# Patient Record
Sex: FEMALE | Race: WHITE | Employment: OTHER | URBAN - METROPOLITAN AREA
[De-identification: names, ages, dates, MRNs, and addresses within clinical notes are randomized per-mention and may not be internally consistent; named-entity substitution may affect disease eponyms.]

---

## 2017-10-12 PROBLEM — Q25.3 SUPRAVALVULAR AORTIC STENOSIS: Status: ACTIVE | Noted: 2017-10-12

## 2018-07-18 PROBLEM — M48.061 SPINAL STENOSIS OF LUMBAR REGION WITHOUT NEUROGENIC CLAUDICATION: Status: ACTIVE | Noted: 2018-07-18

## 2018-10-17 PROBLEM — I27.21 SECONDARY PULMONARY ARTERIAL HYPERTENSION (HCC): Status: ACTIVE | Noted: 2018-10-17

## 2019-02-01 PROBLEM — I87.2 VENOUS INSUFFICIENCY OF LEFT LOWER EXTREMITY: Status: ACTIVE | Noted: 2019-02-01

## 2019-08-15 ENCOUNTER — HOSPITAL ENCOUNTER (OUTPATIENT)
Dept: MRI IMAGING | Age: 79
Discharge: HOME | End: 2019-08-15
Attending: FAMILY MEDICINE
Payer: MEDICARE

## 2019-08-15 DIAGNOSIS — N39.3 STRESS INCONTINENCE OF URINE: ICD-10-CM

## 2019-08-15 PROCEDURE — 72148 MRI LUMBAR SPINE W/O DYE: CPT

## 2020-07-28 ENCOUNTER — HOSPITAL ENCOUNTER (OUTPATIENT)
Dept: LAB | Age: 80
Discharge: HOME | End: 2020-07-28

## 2020-07-28 PROCEDURE — 88312 SPECIAL STAINS GROUP 1: CPT

## 2020-07-28 PROCEDURE — 88305 TISSUE EXAM BY PATHOLOGIST: CPT

## 2022-03-18 PROBLEM — M48.061 SPINAL STENOSIS OF LUMBAR REGION WITHOUT NEUROGENIC CLAUDICATION: Status: ACTIVE | Noted: 2018-07-18

## 2022-03-19 PROBLEM — I87.2 VENOUS INSUFFICIENCY OF LEFT LOWER EXTREMITY: Status: ACTIVE | Noted: 2019-02-01

## 2022-03-19 PROBLEM — I27.21 SECONDARY PULMONARY ARTERIAL HYPERTENSION (HCC): Status: ACTIVE | Noted: 2018-10-17

## 2022-03-19 PROBLEM — Q25.3 SUPRAVALVULAR AORTIC STENOSIS: Status: ACTIVE | Noted: 2017-10-12

## 2022-05-21 SDOH — HEALTH STABILITY: PHYSICAL HEALTH: ON AVERAGE, HOW MANY DAYS PER WEEK DO YOU ENGAGE IN MODERATE TO STRENUOUS EXERCISE (LIKE A BRISK WALK)?: 0 DAYS

## 2022-05-21 ASSESSMENT — LIFESTYLE VARIABLES
HOW OFTEN DO YOU HAVE A DRINK CONTAINING ALCOHOL: 1
HOW OFTEN DO YOU HAVE A DRINK CONTAINING ALCOHOL: NEVER
HOW OFTEN DO YOU HAVE SIX OR MORE DRINKS ON ONE OCCASION: 1

## 2022-05-26 ENCOUNTER — OFFICE VISIT (OUTPATIENT)
Dept: FAMILY MEDICINE CLINIC | Facility: CLINIC | Age: 82
End: 2022-05-26
Payer: MEDICARE

## 2022-05-26 VITALS
BODY MASS INDEX: 21.57 KG/M2 | DIASTOLIC BLOOD PRESSURE: 42 MMHG | TEMPERATURE: 98.2 F | WEIGHT: 107 LBS | OXYGEN SATURATION: 97 % | HEIGHT: 59 IN | HEART RATE: 54 BPM | SYSTOLIC BLOOD PRESSURE: 132 MMHG

## 2022-05-26 DIAGNOSIS — M06.9 RHEUMATOID ARTHRITIS, INVOLVING UNSPECIFIED SITE, UNSPECIFIED WHETHER RHEUMATOID FACTOR PRESENT (HCC): ICD-10-CM

## 2022-05-26 DIAGNOSIS — I35.0 AORTIC VALVE STENOSIS, MILD: ICD-10-CM

## 2022-05-26 DIAGNOSIS — I10 ESSENTIAL HYPERTENSION, BENIGN: ICD-10-CM

## 2022-05-26 DIAGNOSIS — Z00.00 MEDICARE ANNUAL WELLNESS VISIT, SUBSEQUENT: Primary | ICD-10-CM

## 2022-05-26 DIAGNOSIS — I35.9 NONRHEUMATIC AORTIC VALVE DISORDER: ICD-10-CM

## 2022-05-26 DIAGNOSIS — I27.21 SECONDARY PULMONARY ARTERIAL HYPERTENSION (HCC): ICD-10-CM

## 2022-05-26 LAB
ALBUMIN SERPL-MCNC: 3.4 G/DL (ref 3.2–4.6)
ALBUMIN/GLOB SERPL: 1 {RATIO} (ref 1.2–3.5)
ALP SERPL-CCNC: 91 U/L (ref 50–136)
ALT SERPL-CCNC: 24 U/L (ref 12–65)
ANION GAP SERPL CALC-SCNC: 3 MMOL/L (ref 7–16)
AST SERPL-CCNC: 25 U/L (ref 15–37)
BASOPHILS # BLD: 0 K/UL (ref 0–0.2)
BASOPHILS NFR BLD: 1 % (ref 0–2)
BILIRUB SERPL-MCNC: 0.7 MG/DL (ref 0.2–1.1)
BUN SERPL-MCNC: 24 MG/DL (ref 8–23)
CALCIUM SERPL-MCNC: 9.6 MG/DL (ref 8.3–10.4)
CHLORIDE SERPL-SCNC: 109 MMOL/L (ref 98–107)
CHOLEST SERPL-MCNC: 178 MG/DL
CO2 SERPL-SCNC: 30 MMOL/L (ref 21–32)
CREAT SERPL-MCNC: 0.8 MG/DL (ref 0.6–1)
DIFFERENTIAL METHOD BLD: ABNORMAL
EOSINOPHIL # BLD: 0.2 K/UL (ref 0–0.8)
EOSINOPHIL NFR BLD: 7 % (ref 0.5–7.8)
ERYTHROCYTE [DISTWIDTH] IN BLOOD BY AUTOMATED COUNT: 14.6 % (ref 11.9–14.6)
GLOBULIN SER CALC-MCNC: 3.3 G/DL (ref 2.3–3.5)
GLUCOSE SERPL-MCNC: 86 MG/DL (ref 65–100)
HCT VFR BLD AUTO: 37.7 % (ref 35.8–46.3)
HDLC SERPL-MCNC: 93 MG/DL (ref 40–60)
HDLC SERPL: 1.9 {RATIO}
HGB BLD-MCNC: 12.2 G/DL (ref 11.7–15.4)
IMM GRANULOCYTES # BLD AUTO: 0 K/UL (ref 0–0.5)
IMM GRANULOCYTES NFR BLD AUTO: 0 % (ref 0–5)
LDLC SERPL CALC-MCNC: 69.6 MG/DL
LYMPHOCYTES # BLD: 1.1 K/UL (ref 0.5–4.6)
LYMPHOCYTES NFR BLD: 33 % (ref 13–44)
MCH RBC QN AUTO: 33.9 PG (ref 26.1–32.9)
MCHC RBC AUTO-ENTMCNC: 32.4 G/DL (ref 31.4–35)
MCV RBC AUTO: 104.7 FL (ref 79.6–97.8)
MONOCYTES # BLD: 0.5 K/UL (ref 0.1–1.3)
MONOCYTES NFR BLD: 16 % (ref 4–12)
NEUTS SEG # BLD: 1.4 K/UL (ref 1.7–8.2)
NEUTS SEG NFR BLD: 43 % (ref 43–78)
NRBC # BLD: 0 K/UL (ref 0–0.2)
PLATELET # BLD AUTO: 87 K/UL (ref 150–450)
PMV BLD AUTO: 12.4 FL (ref 9.4–12.3)
POTASSIUM SERPL-SCNC: 4.3 MMOL/L (ref 3.5–5.1)
PROT SERPL-MCNC: 6.7 G/DL (ref 6.3–8.2)
RBC # BLD AUTO: 3.6 M/UL (ref 4.05–5.2)
SODIUM SERPL-SCNC: 142 MMOL/L (ref 136–145)
TRIGL SERPL-MCNC: 77 MG/DL (ref 35–150)
VLDLC SERPL CALC-MCNC: 15.4 MG/DL (ref 6–23)
WBC # BLD AUTO: 3.2 K/UL (ref 4.3–11.1)

## 2022-05-26 PROCEDURE — G8427 DOCREV CUR MEDS BY ELIG CLIN: HCPCS | Performed by: FAMILY MEDICINE

## 2022-05-26 PROCEDURE — 1036F TOBACCO NON-USER: CPT | Performed by: FAMILY MEDICINE

## 2022-05-26 PROCEDURE — 1090F PRES/ABSN URINE INCON ASSESS: CPT | Performed by: FAMILY MEDICINE

## 2022-05-26 PROCEDURE — 99214 OFFICE O/P EST MOD 30 MIN: CPT | Performed by: FAMILY MEDICINE

## 2022-05-26 PROCEDURE — G8420 CALC BMI NORM PARAMETERS: HCPCS | Performed by: FAMILY MEDICINE

## 2022-05-26 PROCEDURE — 1123F ACP DISCUSS/DSCN MKR DOCD: CPT | Performed by: FAMILY MEDICINE

## 2022-05-26 PROCEDURE — G0439 PPPS, SUBSEQ VISIT: HCPCS | Performed by: FAMILY MEDICINE

## 2022-05-26 PROCEDURE — G8400 PT W/DXA NO RESULTS DOC: HCPCS | Performed by: FAMILY MEDICINE

## 2022-05-26 RX ORDER — MULTIVITAMIN WITH IRON
250 TABLET ORAL
COMMUNITY

## 2022-05-26 RX ORDER — ESTRADIOL 0.1 MG/G
1 CREAM VAGINAL WEEKLY
COMMUNITY
Start: 2019-12-23

## 2022-05-26 RX ORDER — CELECOXIB 100 MG/1
100 CAPSULE ORAL DAILY
COMMUNITY
End: 2022-11-01

## 2022-05-26 RX ORDER — MONTELUKAST SODIUM 4 MG/1
1 TABLET, CHEWABLE ORAL DAILY
Qty: 60 TABLET | Refills: 3 | Status: SHIPPED | OUTPATIENT
Start: 2022-05-26

## 2022-05-26 NOTE — PATIENT INSTRUCTIONS
Personalized Preventive Plan for Farzad Devine - 5/26/2022  Medicare offers a range of preventive health benefits. Some of the tests and screenings are paid in full while other may be subject to a deductible, co-insurance, and/or copay. Some of these benefits include a comprehensive review of your medical history including lifestyle, illnesses that may run in your family, and various assessments and screenings as appropriate. After reviewing your medical record and screening and assessments performed today your provider may have ordered immunizations, labs, imaging, and/or referrals for you. A list of these orders (if applicable) as well as your Preventive Care list are included within your After Visit Summary for your review. Other Preventive Recommendations:    · A preventive eye exam performed by an eye specialist is recommended every 1-2 years to screen for glaucoma; cataracts, macular degeneration, and other eye disorders. · A preventive dental visit is recommended every 6 months. · Try to get at least 150 minutes of exercise per week or 10,000 steps per day on a pedometer . · Order or download the FREE \"Exercise & Physical Activity: Your Everyday Guide\" from [employer]. Call 6-908.719.4639 or search [employer] online. · You need 8704-0076 mg of calcium and 0780-1331 IU of vitamin D per day. It is possible to meet your calcium requirement with diet alone, but a vitamin D supplement is usually necessary to meet this goal.  · When exposed to the sun, use a sunscreen that protects against both UVA and UVB radiation with an SPF of 30 or greater. Reapply every 2 to 3 hours or after sweating, drying off with a towel, or swimming. · Always wear a seat belt when traveling in a car. Always wear a helmet when riding a bicycle or motorcycle.

## 2022-05-26 NOTE — PROGRESS NOTES
Lila Hernandez (: 1940) is a 80 y.o. female, established patient, here for evaluation of the following chief complaint(s):  No chief complaint on file. ASSESSMENT/PLAN:  1. Medicare annual wellness visit, subsequent  -     CBC with Auto Differential; Future  -     Comprehensive Metabolic Panel; Future  -     Lipid Panel; Future  2. Nonrheumatic aortic valve disorder  -     CBC with Auto Differential; Future  -     Comprehensive Metabolic Panel; Future  -     Lipid Panel; Future  3. Aortic valve stenosis, mild  -     CBC with Auto Differential; Future  -     Comprehensive Metabolic Panel; Future  -     Lipid Panel; Future  4. Essential hypertension, benign  -     CBC with Auto Differential; Future  -     Comprehensive Metabolic Panel; Future  -     Lipid Panel; Future  5. Rheumatoid arthritis, involving unspecified site, unspecified whether rheumatoid factor present ([facility] [address]  6. Secondary pulmonary arterial hypertension ([facility] [address]      Return for Medicare Annual Wellness Visit in 1 year. Medicare wellness visit completed  Refill chronic medications  Check labs today  See dentist every 6 months  See eye physician or get eyes checked every 1-2 years  Immunizations reviewed and discussed with patient    SUBJECTIVE/OBJECTIVE:  HPI    Review of Systems    Physical Exam    On this date 22  I have spent 30 minutes reviewing previous notes, test results and face to face with the patient discussing the diagnosis and importance of compliance with the treatment plan as well as documenting on the day of the visit. An electronic signature was used to authenticate this note. -- Alena Nair MD         Medicare Annual Wellness Visit    Lila Hernandez is here for No chief complaint on file. Assessment & Plan   Medicare annual wellness visit, subsequent  -     CBC with Auto Differential; Future  -     Comprehensive Metabolic Panel; Future  -     Lipid Panel;  Future  Nonrheumatic aortic valve disorder  -     CBC with Auto Differential; Future  -     Comprehensive Metabolic Panel; Future  -     Lipid Panel; Future  Aortic valve stenosis, mild  -     CBC with Auto Differential; Future  -     Comprehensive Metabolic Panel; Future  -     Lipid Panel; Future  Essential hypertension, benign  -     CBC with Auto Differential; Future  -     Comprehensive Metabolic Panel; Future  -     Lipid Panel; Future  Rheumatoid arthritis, involving unspecified site, unspecified whether rheumatoid factor present ([facility] [address]  Secondary pulmonary arterial hypertension ([facility] [address]      Recommendations for Preventive Services Due: see orders and patient instructions/AVS.  Recommended screening schedule for the next 5-10 years is provided to the patient in written form: see Patient Instructions/AVS.     Return for Medicare Annual Wellness Visit in 1 year. Subjective   The following acute and/or chronic problems were also addressed today:  Hip, low back pain, severe, seeing rheum, ortho, and pain management,   Declines stronger prescription medications at this time. Patient's complete Health Risk Assessment and screening values have been reviewed and are found in Flowsheets. The following problems were reviewed today and where indicated follow up appointments were made and/or referrals ordered. Positive Risk Factor Screenings with Interventions:    Fall Risk:  Do you feel unsteady or are you worried about falling? : (!) yes  2 or more falls in past year?: no  Fall with injury in past year?: no     Fall Risk Interventions:    · discussed with patient.               General Health and ACP:  General  In general, how would you say your health is?: Fair  In the past 7 days, have you experienced any of the following: New or Increased Pain, New or Increased Fatigue, Loneliness, Social Isolation, Stress or Anger?: (!) Yes  Select all that apply: (!) New or Increased Pain,Stress  Do you get the social and emotional support that you need?: Yes  Do you have a Living Will?: Yes    Advance Directives     Power of  Living Will ACP-Advance Directive ACP-Power of Vince Mustafa on 08/28/17 Not on File Not on File [address] [facility] [place] Risk Interventions:  · Pain issues: pain management referral ordered     Hearing/Vision:  Do you or your family notice any trouble with your hearing that hasn't been managed with hearing aids?: (!) Yes  Do you have difficulty driving, watching TV, or doing any of your daily activities because of your eyesight?: (!) Yes  Have you had an eye exam within the past year?: Yes  No exam data present    Hearing/Vision Interventions:  · discussed            Objective   Vitals:    05/26/22 1008   BP: (!) 132/42   Pulse: 54   Temp: 98.2 °F (36.8 °C)   TempSrc: Oral   SpO2: 97%   Weight: 107 lb (48.5 kg)   Height: 4' 10.5\" (1.486 m)      Body mass index is 21.98 kg/m². Allergies   Allergen Reactions    Amoxicillin Other (See Comments)     Causes increased methotrexate levels    Azithromycin Diarrhea    Benazepril      Unknown to pt       Celecoxib Other (See Comments)     GI upset.   As of 1/6/2021 pt states she does not have a reaction to this and takes Celebrex    Cyclobenzaprine Other (See Comments)    Ergotamine Other (See Comments)     Abdominal pain     Fluvastatin Other (See Comments)    Ibandronic Acid Other (See Comments)     Severe bone pain    Lactose Other (See Comments)    Other Myalgia and Other (See Comments)    Pravastatin Other (See Comments)    Risedronate Other (See Comments)     Severe bone pain all over    Rosuvastatin Other (See Comments)    Shellfish Allergy Diarrhea    Simvastatin Other (See Comments)    Sulfa Antibiotics Itching and Other (See Comments)     Patient states \"tingling in hands and feet\"    Sulfisoxazole Other (See Comments)     Tingling in fingers    Tetracycline Diarrhea and Other (See Comments)     Abdominal pain    Trospium Other (See Comments)    Adhesive Tape Rash and Other (See Comments)     Surgical tape      Amlodipine Rash     Prior to Visit Medications    Medication Sig Taking? Authorizing Provider   celecoxib (CELEBREX) 100 MG capsule Take 100 mg by mouth daily Yes Historical Provider, MD   calcium citrate-vitamin d 250-200 MG-UNIT TABS Take by mouth daily Yes Historical Provider, MD   colestipol (COLESTID) 1 g tablet Take 1 tablet by mouth daily TAKE 1 TABLET BY MOUTH EVERY DAY Yes Nikia Capellan MD   Menaquinone-7 (VITAMIN K2 PO) Take by mouth Yes Ar Automatic Reconciliation   acetaminophen (TYLENOL) 650 MG extended release tablet Take 650 mg by mouth 2 times daily Yes Ar Automatic Reconciliation   ascorbic acid (VITAMIN C) 500 MG tablet Take 500 mg by mouth 2 times daily Yes Ar Automatic Reconciliation   vitamin D3 (CHOLECALCIFEROL) 125 MCG (5000 UT) TABS tablet Take by mouth daily Yes Ar Automatic Reconciliation   coenzyme Q10 100 MG CAPS capsule Take 100 mg by mouth daily Yes Ar Automatic Reconciliation   ezetimibe (ZETIA) 10 MG tablet TAKE 1 TABLET BY MOUTH EVERY DAY Yes Ar Automatic Reconciliation   famotidine (PEPCID) 40 MG tablet TAKE 1 TABLET BY MOUTH EVERY DAY AT NIGHT Yes Ar Automatic Reconciliation   folic acid (FOLVITE) 1 MG tablet Takes 5 tabs qd Yes Ar Automatic Reconciliation   furosemide (LASIX) 20 MG tablet TAKE 1 TABLET BY MOUTH EVERY DAY Yes Ar Automatic Reconciliation   niacin (NIASPAN) 500 MG extended release tablet Take 500 mg by mouth daily Yes Ar Automatic Reconciliation   oxybutynin (DITROPAN-XL) 10 MG extended release tablet 10 mg daily Yes Ar Automatic Reconciliation   pantoprazole (PROTONIX) 40 MG tablet TAKE 1 TABLET BY MOUTH EVERY DAY Yes Ar Automatic Reconciliation   pregabalin (LYRICA) 50 MG capsule Take 50 mg by mouth every 12 hours.  Yes Ar Automatic Reconciliation       CareTeam (Including outside providers/suppliers regularly involved in providing care):   Patient Care Team:  Nikia Capellan MD as PCP - Adam Grant MD as PCP - [facility] Empaneled Provider     Reviewed and updated this visit:  Tobacco  Allergies  Meds  Problems  Med Hx  Surg Hx  Soc Hx  Fam Hx

## 2022-08-10 RX ORDER — EZETIMIBE 10 MG/1
10 TABLET ORAL DAILY
Qty: 30 TABLET | Refills: 5 | Status: SHIPPED | OUTPATIENT
Start: 2022-08-10

## 2022-09-02 ENCOUNTER — TELEPHONE (OUTPATIENT)
Dept: FAMILY MEDICINE CLINIC | Facility: CLINIC | Age: 82
End: 2022-09-02

## 2022-09-02 NOTE — TELEPHONE ENCOUNTER
Patient called stating she need a refill on Pantoprazole 40 mg to be sent to [facility]

## 2022-09-07 RX ORDER — PANTOPRAZOLE SODIUM 40 MG/1
TABLET, DELAYED RELEASE ORAL
Qty: 90 TABLET | Refills: 3 | Status: SHIPPED | OUTPATIENT
Start: 2022-09-07

## 2022-09-07 NOTE — TELEPHONE ENCOUNTER
Patient called stating she need a refill on Pantoprazole 40 mg to be sent to [address] [place], [place] [address].

## 2022-09-08 RX ORDER — PANTOPRAZOLE SODIUM 40 MG/1
TABLET, DELAYED RELEASE ORAL
Qty: 90 TABLET | Refills: 3 | OUTPATIENT
Start: 2022-09-08

## 2022-10-06 ENCOUNTER — PATIENT MESSAGE (OUTPATIENT)
Dept: FAMILY MEDICINE CLINIC | Facility: CLINIC | Age: 82
End: 2022-10-06

## 2022-10-06 NOTE — TELEPHONE ENCOUNTER
Yes, please call her and schedule a sooner appointment if available.   With me or another provider for hospital followup

## 2022-10-06 NOTE — TELEPHONE ENCOUNTER
Vm left for the patient to make a hospital follow up appointment and for hospital follow up notes. First attempt.

## 2022-10-06 NOTE — TELEPHONE ENCOUNTER
From: Dayday Dowell  To: Dr. Patricia Gutierrez  Sent: 10/6/2022 9:38 AM EDT  Subject: Latest stay at [facility] (9/30 - 10/4)    Dr. Marisabel Dias, I realize [facility] doesn't notify you when I'm in their hospital. I was admitted on 9/30 with severe rectal bleeding & acute blood loss anemia. My hemoglobin dropped to 6.3 and I needed 2 blood transfusions. I was discharged on 10/4 after it reached 9.9. Diverticulosis and 2 ulcers discovered in the distal descendinng colon were the likely bleeding sources. The bleeding was caused by Celebrex which I will no longer be able to take. I have a scheduled appt next week on 10/12 with Dr. Bonilla Ojeda and one with you on 11/14. Do you need to see me before that?

## 2022-11-01 ENCOUNTER — OFFICE VISIT (OUTPATIENT)
Dept: FAMILY MEDICINE CLINIC | Facility: CLINIC | Age: 82
End: 2022-11-01
Payer: MEDICARE

## 2022-11-01 DIAGNOSIS — M06.9 RHEUMATOID ARTHRITIS, INVOLVING UNSPECIFIED SITE, UNSPECIFIED WHETHER RHEUMATOID FACTOR PRESENT (HCC): Primary | ICD-10-CM

## 2022-11-01 DIAGNOSIS — G89.29 CHRONIC LEFT HIP PAIN: ICD-10-CM

## 2022-11-01 DIAGNOSIS — I35.9 NONRHEUMATIC AORTIC VALVE DISORDER: ICD-10-CM

## 2022-11-01 DIAGNOSIS — M25.552 CHRONIC LEFT HIP PAIN: ICD-10-CM

## 2022-11-01 DIAGNOSIS — I35.0 AORTIC VALVE STENOSIS, MILD: ICD-10-CM

## 2022-11-01 DIAGNOSIS — I10 ESSENTIAL HYPERTENSION, BENIGN: ICD-10-CM

## 2022-11-01 LAB
BASOPHILS # BLD: 0 K/UL (ref 0–0.2)
BASOPHILS NFR BLD: 1 % (ref 0–2)
DIFFERENTIAL METHOD BLD: ABNORMAL
EOSINOPHIL # BLD: 0.1 K/UL (ref 0–0.8)
EOSINOPHIL NFR BLD: 1 % (ref 0.5–7.8)
ERYTHROCYTE [DISTWIDTH] IN BLOOD BY AUTOMATED COUNT: 18.4 % (ref 11.9–14.6)
HCT VFR BLD AUTO: 34 % (ref 35.8–46.3)
HGB BLD-MCNC: 10.9 G/DL (ref 11.7–15.4)
IMM GRANULOCYTES # BLD AUTO: 0 K/UL (ref 0–0.5)
IMM GRANULOCYTES NFR BLD AUTO: 0 % (ref 0–5)
LYMPHOCYTES # BLD: 1.3 K/UL (ref 0.5–4.6)
LYMPHOCYTES NFR BLD: 29 % (ref 13–44)
MCH RBC QN AUTO: 33 PG (ref 26.1–32.9)
MCHC RBC AUTO-ENTMCNC: 32.1 G/DL (ref 31.4–35)
MCV RBC AUTO: 103 FL (ref 82–102)
MONOCYTES # BLD: 0.5 K/UL (ref 0.1–1.3)
MONOCYTES NFR BLD: 11 % (ref 4–12)
NEUTS SEG # BLD: 2.5 K/UL (ref 1.7–8.2)
NEUTS SEG NFR BLD: 58 % (ref 43–78)
NRBC # BLD: 0 K/UL (ref 0–0.2)
PLATELET # BLD AUTO: 86 K/UL (ref 150–450)
PMV BLD AUTO: 12.8 FL (ref 9.4–12.3)
RBC # BLD AUTO: 3.3 M/UL (ref 4.05–5.2)
WBC # BLD AUTO: 4.4 K/UL (ref 4.3–11.1)

## 2022-11-01 PROCEDURE — 1090F PRES/ABSN URINE INCON ASSESS: CPT | Performed by: FAMILY MEDICINE

## 2022-11-01 PROCEDURE — G8420 CALC BMI NORM PARAMETERS: HCPCS | Performed by: FAMILY MEDICINE

## 2022-11-01 PROCEDURE — 1036F TOBACCO NON-USER: CPT | Performed by: FAMILY MEDICINE

## 2022-11-01 PROCEDURE — G8484 FLU IMMUNIZE NO ADMIN: HCPCS | Performed by: FAMILY MEDICINE

## 2022-11-01 PROCEDURE — G8400 PT W/DXA NO RESULTS DOC: HCPCS | Performed by: FAMILY MEDICINE

## 2022-11-01 PROCEDURE — 1123F ACP DISCUSS/DSCN MKR DOCD: CPT | Performed by: FAMILY MEDICINE

## 2022-11-01 PROCEDURE — G8428 CUR MEDS NOT DOCUMENT: HCPCS | Performed by: FAMILY MEDICINE

## 2022-11-01 PROCEDURE — 99214 OFFICE O/P EST MOD 30 MIN: CPT | Performed by: FAMILY MEDICINE

## 2022-11-01 ASSESSMENT — ENCOUNTER SYMPTOMS: BACK PAIN: 1

## 2022-11-01 NOTE — PROGRESS NOTES
due to ulcers in her descending colon, on oxycodone and Lyrica, Tylenol,    Review of Systems   Musculoskeletal:  Positive for arthralgias, back pain, gait problem and myalgias. Physical Exam  Vitals and nursing note reviewed. Constitutional:       General: She is not in acute distress. Appearance: Normal appearance. She is not ill-appearing. HENT:      Head: Normocephalic and atraumatic. Right Ear: External ear normal.      Left Ear: External ear normal.      Nose: Nose normal.      Mouth/Throat:      Mouth: Mucous membranes are dry. Eyes:      Extraocular Movements: Extraocular movements intact. Pupils: Pupils are equal, round, and reactive to light. Cardiovascular:      Rate and Rhythm: Normal rate. Pulses: Normal pulses. Pulmonary:      Effort: Pulmonary effort is normal.      Breath sounds: Normal breath sounds. Abdominal:      General: There is no distension. Musculoskeletal:         General: Normal range of motion. Cervical back: Normal range of motion and neck supple. Skin:     General: Skin is warm and dry. Neurological:      General: No focal deficit present. Mental Status: She is alert and oriented to person, place, and time. Psychiatric:         Mood and Affect: Mood normal.         On this date 11/01/22  I have spent 30 minutes reviewing previous notes, test results and face to face with the patient discussing the diagnosis and importance of compliance with the treatment plan as well as documenting on the day of the visit. An electronic signature was used to authenticate this note.   -- Manda Reid MD

## 2022-11-02 ENCOUNTER — HOSPITAL ENCOUNTER (OUTPATIENT)
Dept: GENERAL RADIOLOGY | Age: 82
Discharge: HOME | End: 2022-11-05
Payer: MEDICARE

## 2022-11-02 DIAGNOSIS — G89.29 CHRONIC LEFT HIP PAIN: ICD-10-CM

## 2022-11-02 DIAGNOSIS — M25.552 CHRONIC LEFT HIP PAIN: ICD-10-CM

## 2022-11-02 LAB
ALBUMIN SERPL-MCNC: 3.3 G/DL (ref 3.2–4.6)
ALBUMIN/GLOB SERPL: 1.1 {RATIO} (ref 0.4–1.6)
ALP SERPL-CCNC: 89 U/L (ref 50–136)
ALT SERPL-CCNC: 23 U/L (ref 12–65)
ANION GAP SERPL CALC-SCNC: ABNORMAL MMOL/L (ref 2–11)
AST SERPL-CCNC: 29 U/L (ref 15–37)
BILIRUB SERPL-MCNC: 0.5 MG/DL (ref 0.2–1.1)
BUN SERPL-MCNC: 24 MG/DL (ref 8–23)
CALCIUM SERPL-MCNC: 9.6 MG/DL (ref 8.3–10.4)
CHLORIDE SERPL-SCNC: 111 MMOL/L (ref 101–110)
CHOLEST SERPL-MCNC: 194 MG/DL
CO2 SERPL-SCNC: 32 MMOL/L (ref 21–32)
CREAT SERPL-MCNC: 0.8 MG/DL (ref 0.6–1)
EST. AVERAGE GLUCOSE BLD GHB EST-MCNC: 97 MG/DL
GLOBULIN SER CALC-MCNC: 2.9 G/DL (ref 2.8–4.5)
GLUCOSE SERPL-MCNC: 115 MG/DL (ref 65–100)
HBA1C MFR BLD: 5 % (ref 4.8–5.6)
HDLC SERPL-MCNC: 93 MG/DL (ref 40–60)
HDLC SERPL: 2.1 {RATIO}
LDLC SERPL CALC-MCNC: 83.4 MG/DL
POTASSIUM SERPL-SCNC: 4.1 MMOL/L (ref 3.5–5.1)
PROT SERPL-MCNC: 6.2 G/DL (ref 6.3–8.2)
SODIUM SERPL-SCNC: 140 MMOL/L (ref 133–143)
TRIGL SERPL-MCNC: 88 MG/DL (ref 35–150)
TSH, 3RD GENERATION: 1.81 UIU/ML (ref 0.36–3.74)
VLDLC SERPL CALC-MCNC: 17.6 MG/DL (ref 6–23)

## 2022-11-02 PROCEDURE — 73502 X-RAY EXAM HIP UNI 2-3 VIEWS: CPT

## 2022-11-02 NOTE — RESULT ENCOUNTER NOTE
You are anemic. Please make sure to take iron 325mg po daily over the counter, and we should recheck these in a few months.

## 2022-11-12 ENCOUNTER — HOSPITAL ENCOUNTER (INPATIENT)
Age: 82
LOS: 1 days | Discharge: ANOTHER HOSPITAL | DRG: 281 | End: 2022-11-13
Attending: EMERGENCY MEDICINE | Admitting: HOSPITALIST
Payer: MEDICARE

## 2022-11-12 ENCOUNTER — HOSPITAL ENCOUNTER (EMERGENCY)
Dept: CT IMAGING | Age: 82
Discharge: HOME | DRG: 281 | End: 2022-11-15
Payer: MEDICARE

## 2022-11-12 ENCOUNTER — HOSPITAL ENCOUNTER (EMERGENCY)
Dept: GENERAL RADIOLOGY | Age: 82
Discharge: HOME | DRG: 281 | End: 2022-11-15
Payer: MEDICARE

## 2022-11-12 DIAGNOSIS — N30.00 ACUTE CYSTITIS WITHOUT HEMATURIA: ICD-10-CM

## 2022-11-12 DIAGNOSIS — I16.1 HYPERTENSIVE EMERGENCY: Primary | ICD-10-CM

## 2022-11-12 DIAGNOSIS — R77.8 ELEVATED TROPONIN: ICD-10-CM

## 2022-11-12 PROBLEM — R79.89 ELEVATED TROPONIN: Status: ACTIVE | Noted: 2022-11-12

## 2022-11-12 LAB
ALBUMIN SERPL-MCNC: 3.4 G/DL (ref 3.2–4.6)
ALBUMIN/GLOB SERPL: 0.9 {RATIO} (ref 0.4–1.6)
ALP SERPL-CCNC: 94 U/L (ref 50–130)
ALT SERPL-CCNC: 23 U/L (ref 12–65)
ANION GAP SERPL CALC-SCNC: 9 MMOL/L (ref 2–11)
APPEARANCE UR: ABNORMAL
APTT PPP: 27.9 SEC (ref 24.5–34.2)
AST SERPL-CCNC: 38 U/L (ref 15–37)
BACTERIA URNS QL MICRO: ABNORMAL /HPF
BASOPHILS # BLD: 0 K/UL (ref 0–0.2)
BASOPHILS NFR BLD: 0 % (ref 0–2)
BILIRUB SERPL-MCNC: 0.8 MG/DL (ref 0.2–1.1)
BILIRUB UR QL: NEGATIVE
BUN SERPL-MCNC: 17 MG/DL (ref 8–23)
CALCIUM SERPL-MCNC: 10 MG/DL (ref 8.3–10.4)
CHLORIDE SERPL-SCNC: 105 MMOL/L (ref 101–110)
CO2 SERPL-SCNC: 27 MMOL/L (ref 21–32)
COLOR UR: ABNORMAL
CREAT SERPL-MCNC: 0.83 MG/DL (ref 0.6–1)
DIFFERENTIAL METHOD BLD: ABNORMAL
EKG ATRIAL RATE: 108 BPM
EKG ATRIAL RATE: 82 BPM
EKG DIAGNOSIS: NORMAL
EKG DIAGNOSIS: NORMAL
EKG P AXIS: 72 DEGREES
EKG P AXIS: 79 DEGREES
EKG P-R INTERVAL: 138 MS
EKG P-R INTERVAL: 148 MS
EKG Q-T INTERVAL: 346 MS
EKG Q-T INTERVAL: 366 MS
EKG QRS DURATION: 76 MS
EKG QRS DURATION: 80 MS
EKG QTC CALCULATION (BAZETT): 427 MS
EKG QTC CALCULATION (BAZETT): 463 MS
EKG R AXIS: 3 DEGREES
EKG R AXIS: 5 DEGREES
EKG T AXIS: 58 DEGREES
EKG T AXIS: 69 DEGREES
EKG VENTRICULAR RATE: 108 BPM
EKG VENTRICULAR RATE: 82 BPM
EOSINOPHIL # BLD: 0 K/UL (ref 0–0.8)
EOSINOPHIL NFR BLD: 0 % (ref 0.5–7.8)
ERYTHROCYTE [DISTWIDTH] IN BLOOD BY AUTOMATED COUNT: 16.9 % (ref 11.9–14.6)
GLOBULIN SER CALC-MCNC: 4 G/DL (ref 2.8–4.5)
GLUCOSE SERPL-MCNC: 87 MG/DL (ref 65–100)
GLUCOSE UR STRIP.AUTO-MCNC: NEGATIVE MG/DL
HCT VFR BLD AUTO: 38.5 % (ref 35.8–46.3)
HGB BLD-MCNC: 12.5 G/DL (ref 11.7–15.4)
HGB UR QL STRIP: NEGATIVE
IMM GRANULOCYTES # BLD AUTO: 0 K/UL (ref 0–0.5)
IMM GRANULOCYTES NFR BLD AUTO: 0 % (ref 0–5)
INR PPP: 0.9
KETONES UR QL STRIP.AUTO: 15 MG/DL
LEUKOCYTE ESTERASE UR QL STRIP.AUTO: ABNORMAL
LIPASE SERPL-CCNC: 70 U/L (ref 73–393)
LYMPHOCYTES # BLD: 0.7 K/UL (ref 0.5–4.6)
LYMPHOCYTES NFR BLD: 15 % (ref 13–44)
MCH RBC QN AUTO: 32 PG (ref 26.1–32.9)
MCHC RBC AUTO-ENTMCNC: 32.5 G/DL (ref 31.4–35)
MCV RBC AUTO: 98.5 FL (ref 82–102)
MONOCYTES # BLD: 0.4 K/UL (ref 0.1–1.3)
MONOCYTES NFR BLD: 8 % (ref 4–12)
NEUTS SEG # BLD: 3.8 K/UL (ref 1.7–8.2)
NEUTS SEG NFR BLD: 77 % (ref 43–78)
NITRITE UR QL STRIP.AUTO: POSITIVE
NRBC # BLD: 0 K/UL (ref 0–0.2)
PH UR STRIP: 5.5 [PH] (ref 5–9)
PLATELET # BLD AUTO: 132 K/UL (ref 150–450)
PMV BLD AUTO: 11.3 FL (ref 9.4–12.3)
POTASSIUM SERPL-SCNC: 3.5 MMOL/L (ref 3.5–5.1)
PROT SERPL-MCNC: 7.4 G/DL (ref 6.3–8.2)
PROT UR STRIP-MCNC: ABNORMAL MG/DL
PROTHROMBIN TIME: 12.4 SEC (ref 12.6–14.3)
RBC # BLD AUTO: 3.91 M/UL (ref 4.05–5.2)
SODIUM SERPL-SCNC: 141 MMOL/L (ref 133–143)
SP GR UR REFRACTOMETRY: 1.02 (ref 1–1.02)
TROPONIN I SERPL HS-MCNC: 205.4 PG/ML (ref 0–14)
TROPONIN I SERPL HS-MCNC: 247.2 PG/ML (ref 0–14)
TROPONIN I SERPL HS-MCNC: 495.2 PG/ML (ref 0–14)
UFH PPP CHRO-ACNC: 0.5 IU/ML (ref 0.3–0.7)
UROBILINOGEN UR QL STRIP.AUTO: 0.2 EU/DL (ref 0.2–1)
WBC # BLD AUTO: 5 K/UL (ref 4.3–11.1)
WBC URNS QL MICRO: ABNORMAL /HPF

## 2022-11-12 PROCEDURE — 80053 COMPREHEN METABOLIC PANEL: CPT

## 2022-11-12 PROCEDURE — 83690 ASSAY OF LIPASE: CPT

## 2022-11-12 PROCEDURE — 2500000003 HC RX 250 WO HCPCS: Performed by: EMERGENCY MEDICINE

## 2022-11-12 PROCEDURE — 85025 COMPLETE CBC W/AUTO DIFF WBC: CPT

## 2022-11-12 PROCEDURE — 85610 PROTHROMBIN TIME: CPT

## 2022-11-12 PROCEDURE — 93005 ELECTROCARDIOGRAM TRACING: CPT | Performed by: EMERGENCY MEDICINE

## 2022-11-12 PROCEDURE — 6370000000 HC RX 637 (ALT 250 FOR IP): Performed by: EMERGENCY MEDICINE

## 2022-11-12 PROCEDURE — 96376 TX/PRO/DX INJ SAME DRUG ADON: CPT | Performed by: EMERGENCY MEDICINE

## 2022-11-12 PROCEDURE — 6360000002 HC RX W HCPCS: Performed by: EMERGENCY MEDICINE

## 2022-11-12 PROCEDURE — 96365 THER/PROPH/DIAG IV INF INIT: CPT | Performed by: EMERGENCY MEDICINE

## 2022-11-12 PROCEDURE — 85520 HEPARIN ASSAY: CPT

## 2022-11-12 PROCEDURE — 99285 EMERGENCY DEPT VISIT HI MDM: CPT | Performed by: EMERGENCY MEDICINE

## 2022-11-12 PROCEDURE — 81001 URINALYSIS AUTO W/SCOPE: CPT

## 2022-11-12 PROCEDURE — 85730 THROMBOPLASTIN TIME PARTIAL: CPT

## 2022-11-12 PROCEDURE — 70450 CT HEAD/BRAIN W/O DYE: CPT

## 2022-11-12 PROCEDURE — 2580000003 HC RX 258: Performed by: EMERGENCY MEDICINE

## 2022-11-12 PROCEDURE — 36415 COLL VENOUS BLD VENIPUNCTURE: CPT

## 2022-11-12 PROCEDURE — 1100000003 HC PRIVATE W/ TELEMETRY

## 2022-11-12 PROCEDURE — 6370000000 HC RX 637 (ALT 250 FOR IP): Performed by: HOSPITALIST

## 2022-11-12 PROCEDURE — 2580000003 HC RX 258: Performed by: HOSPITALIST

## 2022-11-12 PROCEDURE — 96375 TX/PRO/DX INJ NEW DRUG ADDON: CPT | Performed by: EMERGENCY MEDICINE

## 2022-11-12 PROCEDURE — 84484 ASSAY OF TROPONIN QUANT: CPT

## 2022-11-12 PROCEDURE — 71045 X-RAY EXAM CHEST 1 VIEW: CPT

## 2022-11-12 RX ORDER — OXYCODONE HYDROCHLORIDE 5 MG/1
5 TABLET ORAL DAILY PRN
Status: DISCONTINUED | OUTPATIENT
Start: 2022-11-12 | End: 2022-11-13 | Stop reason: HOSPADM

## 2022-11-12 RX ORDER — SODIUM CHLORIDE 0.9 % (FLUSH) 0.9 %
5-40 SYRINGE (ML) INJECTION EVERY 12 HOURS SCHEDULED
Status: DISCONTINUED | OUTPATIENT
Start: 2022-11-12 | End: 2022-11-13 | Stop reason: HOSPADM

## 2022-11-12 RX ORDER — FAMOTIDINE 20 MG/1
20 TABLET, FILM COATED ORAL NIGHTLY
Status: DISCONTINUED | OUTPATIENT
Start: 2022-11-12 | End: 2022-11-13 | Stop reason: HOSPADM

## 2022-11-12 RX ORDER — OXYBUTYNIN CHLORIDE 10 MG/1
10 TABLET, EXTENDED RELEASE ORAL DAILY
Status: DISCONTINUED | OUTPATIENT
Start: 2022-11-13 | End: 2022-11-13 | Stop reason: HOSPADM

## 2022-11-12 RX ORDER — SODIUM CHLORIDE 9 MG/ML
INJECTION, SOLUTION INTRAVENOUS PRN
Status: DISCONTINUED | OUTPATIENT
Start: 2022-11-12 | End: 2022-11-13 | Stop reason: HOSPADM

## 2022-11-12 RX ORDER — SODIUM CHLORIDE 0.9 % (FLUSH) 0.9 %
5-40 SYRINGE (ML) INJECTION PRN
Status: DISCONTINUED | OUTPATIENT
Start: 2022-11-12 | End: 2022-11-13 | Stop reason: HOSPADM

## 2022-11-12 RX ORDER — LANOLIN ALCOHOL/MO/W.PET/CERES
400 CREAM (GRAM) TOPICAL
Status: DISCONTINUED | OUTPATIENT
Start: 2022-11-12 | End: 2022-11-13 | Stop reason: HOSPADM

## 2022-11-12 RX ORDER — ASPIRIN 81 MG/1
81 TABLET, CHEWABLE ORAL DAILY
Status: DISCONTINUED | OUTPATIENT
Start: 2022-11-13 | End: 2022-11-13 | Stop reason: HOSPADM

## 2022-11-12 RX ORDER — EZETIMIBE 10 MG/1
10 TABLET ORAL DAILY
Status: DISCONTINUED | OUTPATIENT
Start: 2022-11-13 | End: 2022-11-13 | Stop reason: HOSPADM

## 2022-11-12 RX ORDER — HEPARIN SODIUM 1000 [USP'U]/ML
60 INJECTION, SOLUTION INTRAVENOUS; SUBCUTANEOUS ONCE
Status: DISCONTINUED | OUTPATIENT
Start: 2022-11-12 | End: 2022-11-12

## 2022-11-12 RX ORDER — ACETAMINOPHEN 500 MG
1000 TABLET ORAL EVERY 8 HOURS
Status: DISCONTINUED | OUTPATIENT
Start: 2022-11-12 | End: 2022-11-13 | Stop reason: HOSPADM

## 2022-11-12 RX ORDER — VITAMIN B COMPLEX
2000 TABLET ORAL
Status: DISCONTINUED | OUTPATIENT
Start: 2022-11-14 | End: 2022-11-13 | Stop reason: HOSPADM

## 2022-11-12 RX ORDER — ASPIRIN 325 MG
325 TABLET ORAL
Status: COMPLETED | OUTPATIENT
Start: 2022-11-12 | End: 2022-11-12

## 2022-11-12 RX ORDER — PANTOPRAZOLE SODIUM 40 MG/1
40 TABLET, DELAYED RELEASE ORAL DAILY
Status: DISCONTINUED | OUTPATIENT
Start: 2022-11-13 | End: 2022-11-13 | Stop reason: HOSPADM

## 2022-11-12 RX ORDER — ACETAMINOPHEN 650 MG/1
650 SUPPOSITORY RECTAL EVERY 6 HOURS PRN
Status: DISCONTINUED | OUTPATIENT
Start: 2022-11-12 | End: 2022-11-13 | Stop reason: HOSPADM

## 2022-11-12 RX ORDER — HEPARIN SODIUM 1000 [USP'U]/ML
60 INJECTION, SOLUTION INTRAVENOUS; SUBCUTANEOUS ONCE
Status: COMPLETED | OUTPATIENT
Start: 2022-11-12 | End: 2022-11-12

## 2022-11-12 RX ORDER — METOPROLOL TARTRATE 5 MG/5ML
5 INJECTION INTRAVENOUS
Status: COMPLETED | OUTPATIENT
Start: 2022-11-12 | End: 2022-11-12

## 2022-11-12 RX ORDER — FOLIC ACID 1 MG/1
5 TABLET ORAL DAILY
Status: DISCONTINUED | OUTPATIENT
Start: 2022-11-13 | End: 2022-11-13 | Stop reason: HOSPADM

## 2022-11-12 RX ORDER — ONDANSETRON 2 MG/ML
4 INJECTION INTRAMUSCULAR; INTRAVENOUS EVERY 6 HOURS PRN
Status: DISCONTINUED | OUTPATIENT
Start: 2022-11-12 | End: 2022-11-13 | Stop reason: HOSPADM

## 2022-11-12 RX ORDER — HEPARIN SODIUM 10000 [USP'U]/100ML
5-30 INJECTION, SOLUTION INTRAVENOUS CONTINUOUS
Status: DISCONTINUED | OUTPATIENT
Start: 2022-11-12 | End: 2022-11-12

## 2022-11-12 RX ORDER — METHOTREXATE 2.5 MG/1
7.5 TABLET ORAL WEEKLY
COMMUNITY
Start: 2022-10-12

## 2022-11-12 RX ORDER — ONDANSETRON 2 MG/ML
4 INJECTION INTRAMUSCULAR; INTRAVENOUS
Status: COMPLETED | OUTPATIENT
Start: 2022-11-12 | End: 2022-11-12

## 2022-11-12 RX ORDER — ONDANSETRON 4 MG/1
4 TABLET, ORALLY DISINTEGRATING ORAL EVERY 8 HOURS PRN
Status: DISCONTINUED | OUTPATIENT
Start: 2022-11-12 | End: 2022-11-13 | Stop reason: HOSPADM

## 2022-11-12 RX ORDER — ACETAMINOPHEN 325 MG/1
650 TABLET ORAL EVERY 6 HOURS PRN
Status: DISCONTINUED | OUTPATIENT
Start: 2022-11-12 | End: 2022-11-13 | Stop reason: HOSPADM

## 2022-11-12 RX ORDER — HEPARIN SODIUM 1000 [USP'U]/ML
60 INJECTION, SOLUTION INTRAVENOUS; SUBCUTANEOUS PRN
Status: DISCONTINUED | OUTPATIENT
Start: 2022-11-12 | End: 2022-11-13 | Stop reason: HOSPADM

## 2022-11-12 RX ORDER — HEPARIN SODIUM 1000 [USP'U]/ML
30 INJECTION, SOLUTION INTRAVENOUS; SUBCUTANEOUS PRN
Status: DISCONTINUED | OUTPATIENT
Start: 2022-11-12 | End: 2022-11-13 | Stop reason: HOSPADM

## 2022-11-12 RX ORDER — HYDRALAZINE HYDROCHLORIDE 20 MG/ML
10 INJECTION INTRAMUSCULAR; INTRAVENOUS EVERY 6 HOURS PRN
Status: DISCONTINUED | OUTPATIENT
Start: 2022-11-12 | End: 2022-11-13 | Stop reason: HOSPADM

## 2022-11-12 RX ORDER — POLYETHYLENE GLYCOL 3350 17 G/17G
17 POWDER, FOR SOLUTION ORAL DAILY PRN
Status: DISCONTINUED | OUTPATIENT
Start: 2022-11-12 | End: 2022-11-13 | Stop reason: HOSPADM

## 2022-11-12 RX ORDER — OXYCODONE HYDROCHLORIDE 5 MG/1
TABLET ORAL
COMMUNITY
Start: 2022-10-19

## 2022-11-12 RX ORDER — HEPARIN SODIUM 10000 [USP'U]/100ML
5-30 INJECTION, SOLUTION INTRAVENOUS CONTINUOUS
Status: DISCONTINUED | OUTPATIENT
Start: 2022-11-12 | End: 2022-11-13 | Stop reason: HOSPADM

## 2022-11-12 RX ORDER — HYDRALAZINE HYDROCHLORIDE 25 MG/1
25 TABLET, FILM COATED ORAL EVERY 12 HOURS SCHEDULED
Status: DISCONTINUED | OUTPATIENT
Start: 2022-11-12 | End: 2022-11-13 | Stop reason: HOSPADM

## 2022-11-12 RX ORDER — CHOLESTYRAMINE LIGHT 4 G/5.7G
4 POWDER, FOR SUSPENSION ORAL DAILY
Status: DISCONTINUED | OUTPATIENT
Start: 2022-11-13 | End: 2022-11-13 | Stop reason: HOSPADM

## 2022-11-12 RX ORDER — HEPARIN SODIUM 1000 [USP'U]/ML
60 INJECTION, SOLUTION INTRAVENOUS; SUBCUTANEOUS PRN
Status: DISCONTINUED | OUTPATIENT
Start: 2022-11-12 | End: 2022-11-12

## 2022-11-12 RX ORDER — SODIUM CHLORIDE, SODIUM LACTATE, POTASSIUM CHLORIDE, CALCIUM CHLORIDE 600; 310; 30; 20 MG/100ML; MG/100ML; MG/100ML; MG/100ML
INJECTION, SOLUTION INTRAVENOUS CONTINUOUS
Status: DISCONTINUED | OUTPATIENT
Start: 2022-11-12 | End: 2022-11-13 | Stop reason: HOSPADM

## 2022-11-12 RX ORDER — HYDRALAZINE HYDROCHLORIDE 20 MG/ML
10 INJECTION INTRAMUSCULAR; INTRAVENOUS ONCE
Status: COMPLETED | OUTPATIENT
Start: 2022-11-12 | End: 2022-11-12

## 2022-11-12 RX ORDER — HEPARIN SODIUM 1000 [USP'U]/ML
30 INJECTION, SOLUTION INTRAVENOUS; SUBCUTANEOUS PRN
Status: DISCONTINUED | OUTPATIENT
Start: 2022-11-12 | End: 2022-11-12

## 2022-11-12 RX ADMIN — SODIUM CHLORIDE, PRESERVATIVE FREE 10 ML: 5 INJECTION INTRAVENOUS at 20:17

## 2022-11-12 RX ADMIN — HEPARIN SODIUM 12 UNITS/KG/HR: 10000 INJECTION, SOLUTION INTRAVENOUS at 16:41

## 2022-11-12 RX ADMIN — SODIUM CHLORIDE, POTASSIUM CHLORIDE, SODIUM LACTATE AND CALCIUM CHLORIDE: 600; 310; 30; 20 INJECTION, SOLUTION INTRAVENOUS at 20:31

## 2022-11-12 RX ADMIN — CEFTRIAXONE 1000 MG: 1 INJECTION, POWDER, FOR SOLUTION INTRAMUSCULAR; INTRAVENOUS at 15:09

## 2022-11-12 RX ADMIN — ONDANSETRON 4 MG: 2 INJECTION INTRAMUSCULAR; INTRAVENOUS at 14:33

## 2022-11-12 RX ADMIN — MAGNESIUM OXIDE TAB 400 MG (241.3 MG ELEMENTAL MG) 400 MG: 400 (241.3 MG) TAB at 20:16

## 2022-11-12 RX ADMIN — ASPIRIN 325 MG: 325 TABLET, FILM COATED ORAL at 14:21

## 2022-11-12 RX ADMIN — METOPROLOL TARTRATE 5 MG: 5 INJECTION INTRAVENOUS at 16:40

## 2022-11-12 RX ADMIN — FAMOTIDINE 20 MG: 20 TABLET, FILM COATED ORAL at 20:16

## 2022-11-12 RX ADMIN — ACETAMINOPHEN 1000 MG: 500 TABLET, FILM COATED ORAL at 20:14

## 2022-11-12 RX ADMIN — HEPARIN SODIUM 2830 UNITS: 1000 INJECTION INTRAVENOUS; SUBCUTANEOUS at 16:40

## 2022-11-12 RX ADMIN — HYDRALAZINE HYDROCHLORIDE 25 MG: 25 TABLET, FILM COATED ORAL at 20:15

## 2022-11-12 RX ADMIN — HYDRALAZINE HYDROCHLORIDE 10 MG: 20 INJECTION INTRAMUSCULAR; INTRAVENOUS at 12:58

## 2022-11-12 ASSESSMENT — PAIN - FUNCTIONAL ASSESSMENT
PAIN_FUNCTIONAL_ASSESSMENT: 0-10
PAIN_FUNCTIONAL_ASSESSMENT: ACTIVITIES ARE NOT PREVENTED
PAIN_FUNCTIONAL_ASSESSMENT: ACTIVITIES ARE NOT PREVENTED

## 2022-11-12 ASSESSMENT — PAIN DESCRIPTION - LOCATION
LOCATION: HEAD

## 2022-11-12 ASSESSMENT — PAIN DESCRIPTION - DESCRIPTORS
DESCRIPTORS: PRESSURE
DESCRIPTORS: OTHER (COMMENT)
DESCRIPTORS: ACHING

## 2022-11-12 ASSESSMENT — ENCOUNTER SYMPTOMS
VOMITING: 0
FACIAL SWELLING: 0
SHORTNESS OF BREATH: 0
NAUSEA: 0
CHEST TIGHTNESS: 0
SORE THROAT: 0
EYE PAIN: 0
ABDOMINAL PAIN: 0
BACK PAIN: 0
TROUBLE SWALLOWING: 0
VOICE CHANGE: 0
COUGH: 0

## 2022-11-12 ASSESSMENT — PAIN SCALES - GENERAL
PAINLEVEL_OUTOF10: 8
PAINLEVEL_OUTOF10: 7
PAINLEVEL_OUTOF10: 8
PAINLEVEL_OUTOF10: 0
PAINLEVEL_OUTOF10: 7

## 2022-11-12 ASSESSMENT — PAIN DESCRIPTION - ONSET: ONSET: SUDDEN

## 2022-11-12 NOTE — ED TRIAGE NOTES
Pt c/o constant headache that started Thursday and high BP that started Friday, 181/75, 194/67 and today of 191/84 and 181/80. Pt denies having hypertension, denies double or blurry vision.

## 2022-11-12 NOTE — H&P
Hospitalist History and Physical   Admit Date:  2022 12:40 PM   Name:  Maggie Anderson   Age:  80 y.o. Sex:  female  :  1940   MRN:  [id number]   Room:  ER01/    Presenting Complaint: Headache and Hypertension     Reason(s) for Admission: Chest pain [R07.9]     History of Present Illness:   Maggie Anderson is a 80 y.o. female with medical history of rheumatoid arthritis, aortic stenosis, presented to the ER because of headache and epigastric abdominal pain. Patient states that for the last 2 days she has been having epigastric pain and headache. Patient complains of 8/10 epigastric pain with no radiation. She describes epigastric pain as pressure like. No aggravating or alleviating factors. No chest pain no shortness of breath. No fever no chills. No cough. No palpitations. No tingling numbness or weakness of extremities. ER course  Patient is afebrile. Blood pressure 198/73. Oxygen saturation room air. BMP unremarkable. CBC unremarkable except for mild thrombocytopenia with platelet count of 371. Troponins elevated at 205 to 247.2. Cardiology was contacted by ER physician. Does not believe it is NSTEMI. Most likely elevated troponin secondary to poorly controlled blood pressure. Urine analysis is suggestive of UTI. Stat CT head was done and negative. Showed old stroke. Patient admitted for further evaluation management of uncontrolled hypertension, elevated troponins. Review of Systems:  10 systems reviewed and negative except as noted in HPI. Assessment & Plan: This is a 24-year-old female with    Elevated troponins/NSTEMI  Cardiology consulted. Appreciate recommendations. Initial troponins 205 trended up to 247.2. Started on heparin drip. Aspirin. Patient allergic to statins. EKG with no ST-T wave changes suggestive of ischemia. Uncontrolled hypertension  Blood pressure elevated at 198/70.   Ordered hydralazine 25 mg twice daily.  Hydralazine as needed    UTI  Ceftriaxone pending culture results. Epigastric pain  ? Cardiac  Check ultrasound abdomen. Lipase. Protonix. Rheumatoid arthritis  Outpatient follow-up    Peptic ulcer disease  Protonix      Anticipated discharge needs:   Telemetry, inpatient. Anticipate inpatient hospital stay for 48 hours. Home when medically cleared. Diet: ADULT DIET; Regular; Low Fat/Low Chol/High Fiber/2 gm Na; Vegan  Diet NPO Exceptions are: Sips of Water with Meds, Sips of Clear Liquids  VTE ppx: Heparin drip  Code status: Full Code    Hospital Problems:  Principal Problem:    Chest pain  Active Problems:    Hypertensive emergency    Elevated troponin    Urinary incontinence    Nonrheumatic aortic valve disorder    Rheumatoid arthritis involving multiple joints (HCC)  Resolved Problems:    * No resolved hospital problems.  *       Past History:     Past Medical History:   Diagnosis Date    Aortic regurgitation 9/9/2015    Cho done 9/1/2015     Aortic valve stenosis, mild 10/7/2015    Chest pain 1/20/2016    CHF (congestive heart failure) ([address] 2004    Chronic pain     Diverticulosis of colon (without mention of hemorrhage) 09/30/2014    Noted on colonoscopy 2010, no sViktor terry    Essential hypertension, benign 09/30/2014    Facet arthropathy, lumbar     Family history of colonic polyps 12/28/2015    GERD (gastroesophageal reflux disease)     Hearing loss 2011    Hip fracture ([address] 08/2021    Impaired fasting glucose 09/30/2014    Lactose intolerance in adult 09/30/2014    Malaise and fatigue 1/20/2016    Mild intermittent asthma 09/30/2014    MVP (mitral valve prolapse)     Osteoarthritis     Pure hypercholesterolemia 9/30/2014    Rheumatoid arthritis involving multiple joints ([address] 09/30/2014    Dr. Erickson Barajas for colon cancer 12/28/2015    Senile osteoporosis 9/30/2014    Spinal stenosis of lumbar region     Urinary incontinence 09/30/2014       Past Surgical History:   Procedure Laterality Date    CATARACT REMOVAL Right     9/8/2011    CHOLECYSTECTOMY  07/08/2011    COLONOSCOPY  07/2020    . No more colonoscopies due to age. HIP FRACTURE SURGERY  08/16/2021    HYSTERECTOMY  01/28/1986    total abdominal    OTHER SURGICAL HISTORY      cauterization of a vessle in nose 2018        Social History     Tobacco Use    Smoking status: Never    Smokeless tobacco: Never   Substance Use Topics    Alcohol use: No     Alcohol/week: 0.0 standard drinks      Social History     Substance and Sexual Activity   Drug Use No       Family History   Problem Relation Age of Onset    High Cholesterol Mother     Hypertension Mother     Rheum Arthritis Mother     Anemia Mother     Hearing Loss Mother     Heart Disease Mother     Osteoporosis Mother     Diabetes Father     Colon Polyps Other     Parkinson's Disease Other     Hypertension Other     High Cholesterol Other     Heart Disease Other     Diabetes Other         Immunization History   Administered Date(s) Administered    COVID-19, PFIZER PURPLE top, DILUTE for use, (age 15 y+), 30mcg/0.3mL 01/19/2021, 02/13/2021, 10/11/2021, 04/04/2022    Influenza Trivalent 09/01/2015, 09/29/2015    Influenza Virus Vaccine 09/24/2013, 10/05/2017, 08/22/2020    Influenza, FLUZONE (age 72 y+), High Dose, 0.7mL 08/22/2020    Influenza, High Dose (Fluzone 65 yrs and older) 10/04/2016, 10/05/2017, 10/23/2018, 08/22/2020, 09/06/2021    Influenza, Triv, inactivated, subunit, adjuvanted, IM (Fluad 65 yrs and older) 08/27/2019    Influenza, Trivalent PF 10/01/2014    Pneumococcal Conjugate 13-valent ([id number]) 10/04/2016    Pneumococcal Polysaccharide ([id number]) 01/01/2005, 10/05/2017     Allergies   Allergen Reactions    Amoxicillin Other (See Comments)     Causes increased methotrexate levels    Azithromycin Diarrhea    Benazepril      Unknown to pt       Celecoxib Other (See Comments)     GI upset.   As of 1/6/2021 pt states she does not have a reaction to this and takes Celebrex    Cyclobenzaprine Other (See Comments)    Ergotamine Other (See Comments)     Abdominal pain     Fluvastatin Other (See Comments)    Ibandronic Acid Other (See Comments)     Severe bone pain    Lactose Other (See Comments)    Lotrel [Amlodipine Besy-Benazepril Hcl]      Headache, rash       Other Myalgia and Other (See Comments)    Penbritin-S [Ampicillin]      diarrhea      Pravastatin Other (See Comments)    Risedronate Other (See Comments)     Severe bone pain all over    Rosuvastatin Other (See Comments)    Shellfish Allergy Diarrhea    Simvastatin Other (See Comments)     Also Lescol, Zorcor, Pravachol and Crestor  All cause muscle pain     Sulfa Antibiotics Itching and Other (See Comments)     Patient states \"tingling in hands and feet\"    Sulfisoxazole Other (See Comments)     Tingling in fingers    Tetracycline Diarrhea and Other (See Comments)     Abdominal pain    Trospium Other (See Comments)    Adhesive Tape Rash and Other (See Comments)     Surgical tape and tegaderm     Amlodipine Rash     Prior to Admit Medications:  Current Outpatient Medications   Medication Instructions    ACETAMINOPHEN ER PO 1,000 mg, Oral, 3 TIMES DAILY WITH MEALS    ascorbic acid (VITAMIN C) 500 mg, Oral, 2 TIMES DAILY    calcium citrate-vitamin d 250-200 MG-UNIT TABS Oral, DAILY    Cholecalciferol 6,000 Units, Oral, DAILY    coenzyme Q10 100 mg, Oral, DAILY    colestipol (COLESTID) 1 g, Oral, DAILY, TAKE 1 TABLET BY MOUTH EVERY DAY    estradiol (ESTRACE) 1 g, Vaginal, WEEKLY    ezetimibe (ZETIA) 10 mg, Oral, DAILY, TAKE 1 TABLET BY MOUTH EVERY DAY    famotidine (PEPCID) 40 MG tablet TAKE 1 TABLET BY MOUTH EVERY DAY AT NIGHT    folic acid (FOLVITE) 1 MG tablet Takes 5 tabs qd    furosemide (LASIX) 20 mg, Oral, DAILY PRN, TAKE 1 TABLET BY MOUTH EVERY DAY prn leg swelling    magnesium (MAGNESIUM-OXIDE) 250 mg, Oral, DAILY WITH DINNER    Menaquinone-7 (VITAMIN K2 PO) Oral    methotrexate (RHEUMATREX) 2.5 MG chemo tablet 7.5 mg    Multiple Vitamins-Minerals (CENTRUM SILVER 50+WOMEN PO) Oral, DAILY    niacin (NIASPAN) 500 mg, Oral, DAILY    oxybutynin (DITROPAN-XL) 10 mg, DAILY    oxyCODONE (ROXICODONE) 5 MG immediate release tablet TAKE 1/2 TO 1 (ONE-HALF TO ONE) TABLET BY MOUTH ONCE DAILY AS NEEDED FOR SEVERE EPISODE PAIN    pantoprazole (PROTONIX) 40 MG tablet TAKE 1 TABLET BY MOUTH EVERY DAY    UNABLE TO FIND 2 TIMES DAILY, Fish oil /Omega 3 vegan liquid gel caps. UNABLE TO FIND DAILY, Apple Pectin 500 mg take with niacin     UNABLE TO FIND 2 times daily, Ginko Biloba 60 mg one tablet BID     UNABLE TO FIND 2 oz, Oral, DAILY BEFORE BREAKFAST    UNABLE TO FIND Rolator walker light          Objective:   Patient Vitals for the past 24 hrs:   Temp Pulse Resp BP SpO2   11/12/22 1724 -- 83 -- (!) 162/72 96 %   11/12/22 1654 -- 82 -- (!) 163/70 97 %   11/12/22 1612 -- (!) 106 21 (!) 162/67 96 %   11/12/22 1522 -- (!) 101 -- (!) 162/70 97 %   11/12/22 1353 -- (!) 101 -- (!) 147/65 97 %   11/12/22 1332 -- 100 16 (!) 167/71 100 %   11/12/22 1255 -- 88 -- -- --   11/12/22 1253 -- -- 16 (!) 189/80 --   11/12/22 1209 98.2 °F (36.8 °C) 88 16 (!) 198/73 97 %       Oxygen Therapy  SpO2: 96 %  O2 Device: None (Room air)    Estimated body mass index is 21.01 kg/m² as calculated from the following:    Height as of this encounter: 4' 11\" (1.499 m). Weight as of this encounter: 104 lb (47.2 kg). No intake or output data in the 24 hours ending 11/12/22 1752      Physical Exam:    Blood pressure (!) 162/72, pulse 83, temperature 98.2 °F (36.8 °C), temperature source Oral, resp. rate 21, height 4' 11\" (1.499 m), weight 104 lb (47.2 kg), SpO2 96 %. General:    Well nourished. Alert awake female, ill-appearing,  Head:  Normocephalic, atraumatic  Eyes:  Sclerae appear normal.  Pupils equally round. ENT:  Nares appear normal, no drainage. Moist oral mucosa  Neck:  No restricted ROM. Trachea midline   CV:   RRR. No m/r/g.   No jugular venous distension. Lungs:   CTAB. No wheezing, rhonchi, or rales. Symmetric expansion. Abdomen: Bowel sounds present. Soft, mild epigastric tenderness, nondistended. Extremities: No cyanosis or clubbing. No edema  Skin:     No rashes and normal coloration. Warm and dry. Neuro:  CN II-XII grossly intact. Sensation intact. A&Ox3  Psych:  Normal mood and affect.       I have personally reviewed labs and tests showing:  Recent Labs:  Recent Results (from the past 24 hour(s))   EKG 12 Lead    Collection Time: 11/12/22 12:53 PM   Result Value Ref Range    Ventricular Rate 82 BPM    Atrial Rate 82 BPM    P-R Interval 138 ms    QRS Duration 80 ms    Q-T Interval 366 ms    QTc Calculation (Bazett) 427 ms    P Axis 72 degrees    R Axis 3 degrees    T Axis 58 degrees    Diagnosis !! AGE AND GENDER SPECIFIC ECG ANALYSIS !!    CBC with Auto Differential    Collection Time: 11/12/22 12:54 PM   Result Value Ref Range    WBC 5.0 4.3 - 11.1 K/uL    RBC 3.91 (L) 4.05 - 5.2 M/uL    Hemoglobin 12.5 11.7 - 15.4 g/dL    Hematocrit 38.5 35.8 - 46.3 %    MCV 98.5 82.0 - 102.0 FL    MCH 32.0 26.1 - 32.9 PG    MCHC 32.5 31.4 - 35.0 g/dL    RDW 16.9 (H) 11.9 - 14.6 %    Platelets 514 (L) 674 - 450 K/uL    MPV 11.3 9.4 - 12.3 FL    nRBC 0.00 0.0 - 0.2 K/uL    Differential Type AUTOMATED      Seg Neutrophils 77 43 - 78 %    Lymphocytes 15 13 - 44 %    Monocytes 8 4.0 - 12.0 %    Eosinophils % 0 (L) 0.5 - 7.8 %    Basophils 0 0.0 - 2.0 %    Immature Granulocytes 0 0.0 - 5.0 %    Segs Absolute 3.8 1.7 - 8.2 K/UL    Absolute Lymph # 0.7 0.5 - 4.6 K/UL    Absolute Mono # 0.4 0.1 - 1.3 K/UL    Absolute Eos # 0.0 0.0 - 0.8 K/UL    Basophils Absolute 0.0 0.0 - 0.2 K/UL    Absolute Immature Granulocyte 0.0 0.0 - 0.5 K/UL   Comprehensive Metabolic Panel    Collection Time: 11/12/22 12:54 PM   Result Value Ref Range    Sodium 141 133 - 143 mmol/L    Potassium 3.5 3.5 - 5.1 mmol/L    Chloride 105 101 - 110 mmol/L    CO2 27 21 - 32 mmol/L Anion Gap 9 2 - 11 mmol/L    Glucose 87 65 - 100 mg/dL    BUN 17 8 - 23 MG/DL    Creatinine 0.83 0.6 - 1.0 MG/DL    Est, Glom Filt Rate >60 >60 ml/min/1.73m2    Calcium 10.0 8.3 - 10.4 MG/DL    Total Bilirubin 0.8 0.2 - 1.1 MG/DL    ALT 23 12 - 65 U/L    AST 38 (H) 15 - 37 U/L    Alk Phosphatase 94 50 - 130 U/L    Total Protein 7.4 6.3 - 8.2 g/dL    Albumin 3.4 3.2 - 4.6 g/dL    Globulin 4.0 2.8 - 4.5 g/dL    Albumin/Globulin Ratio 0.9 0.4 - 1.6     Troponin    Collection Time: 11/12/22 12:54 PM   Result Value Ref Range    Troponin, High Sensitivity 205.4 (HH) 0 - 14 pg/mL   APTT    Collection Time: 11/12/22 12:54 PM   Result Value Ref Range    PTT 27.9 24.5 - 34.2 SEC   Protime-INR    Collection Time: 11/12/22 12:54 PM   Result Value Ref Range    Protime 12.4 (L) 12.6 - 14.3 sec    INR 0.9     Urinalysis w rflx microscopic    Collection Time: 11/12/22  1:32 PM   Result Value Ref Range    Color, UA DARK YELLOW      Appearance CLOUDY      Specific Gravity, UA 1.025 (H) 1.001 - 1.023      pH, Urine 5.5 5.0 - 9.0      Protein, UA TRACE (A) NEG mg/dL    Glucose, UA Negative mg/dL    Ketones, Urine 15 (A) NEG mg/dL    Bilirubin Urine Negative NEG      Blood, Urine Negative NEG      Urobilinogen, Urine 0.2 0.2 - 1.0 EU/dL    Nitrite, Urine Positive (A) NEG      Leukocyte Esterase, Urine MODERATE (A) NEG      WBC, UA 20-50 0 /hpf    BACTERIA, URINE 4+ (H) 0 /hpf   EKG 12 Lead    Collection Time: 11/12/22  2:20 PM   Result Value Ref Range    Ventricular Rate 108 BPM    Atrial Rate 108 BPM    P-R Interval 148 ms    QRS Duration 76 ms    Q-T Interval 346 ms    QTc Calculation (Bazett) 463 ms    P Axis 79 degrees    R Axis 5 degrees    T Axis 69 degrees    Diagnosis !! AGE AND GENDER SPECIFIC ECG ANALYSIS !!     Troponin    Collection Time: 11/12/22  2:59 PM   Result Value Ref Range    Troponin, High Sensitivity 247.2 (HH) 0 - 14 pg/mL       I have personally reviewed imaging studies showing:  CT HEAD WO CONTRAST    Result Date: 11/12/2022  CT of the head without contrast. CLINICAL INDICATION: Constant headache, high blood pressure PROCEDURE: Serial thin section axial images are obtained from the cranial vertex through the skull base without the administration of intravenous contrast. Radiation dose reduction techniques were used for this study. Our CT scanners use one or all of the following: Automated exposure control, adjusted of the mA and/or kV according to patient size, iterative reconstruction COMPARISON: No prior. FINDINGS: There is no acute intracranial hemorrhage, mass, or mass effect. No abnormal extra-axial fluid collections identified. There is no hydrocephalus. The basilar cisterns are widely patent. There is a focal area of rounded hypoattenuation in the right centrum semiovale adjacent to the frontal horn of the lateral ventricle. Otherwise the gray-white brain parenchymal interface is well-maintained. No skull fracture or aggressive osseous lesion noted. There is complete opacification of the right maxillary sinus. The remainder of the paranasal sinuses are clear. The mastoid air cells are clear. The middle ear cavities are clear. 1.  No acute intracranial abnormality. 2.  Rounded focal area of hypoattenuation in the right centrum semiovale may be a small old infarct. 3.  Dense opacification of the right maxillary sinus likely representing chronic sinusitis. XR CHEST PORTABLE    Result Date: 11/12/2022  Portable chest x-ray CLINICAL INDICATION: NSTEMI FINDINGS: Single AP view the chest submitted without comparison show the lungs to be expanded and clear. No pleural effusion or pneumothorax. The cardiac silhouette and mediastinum are unremarkable. The bones are osteopenic. No acute abnormality.       Echocardiogram:  10/20/22    TRANSTHORACIC ECHOCARDIOGRAM (TTE) COMPLETE (CONTRAST/BUBBLE/3D PRN) 10/24/2022 12:31 PM, 10/24/2022 12:00 AM (Final)    Interpretation Summary    Left Ventricle: Normal left ventricular systolic function. EF by 2D Simpsons Biplane is 65%. Left ventricle size is normal. Normal wall thickness. Normal wall motion. Normal diastolic function. Aortic Valve: Tricuspid valve. Moderate regurgitation. Noted by the apical view. The aortic valve opens adequately. There is an elevated AV mean gradient is 17 mmHg. AV AT is 92.00 ms. LVOT:AV VTI Index is 0.45. AV Stroke Volume index is 43.3 mL/m2. There is no significant aortic stenosis    Mitral Valve: Mild annular calcification of the mitral valve. Mild to moderate regurgitation. Tricuspid Valve: Moderate regurgitation. The estimated RVSP is 42 mmHg. Technical qualifiers: Color flow Doppler was performed and pulse wave and/or continuous wave Doppler was performed.     Signed by: Bob Lambert MD on 10/24/2022 12:31 PM, Signed by: Unknown Provider Result on 10/24/2022 12:00 AM        Orders Placed This Encounter   Medications    hydrALAZINE (APRESOLINE) injection 10 mg    aspirin tablet 325 mg    DISCONTD: heparin (porcine) injection 2,830 Units    DISCONTD: heparin (porcine) injection 2,830 Units    DISCONTD: heparin (porcine) injection 1,420 Units    DISCONTD: heparin 25,000 units in dextrose 5% 250 mL (premix) infusion    ondansetron (ZOFRAN) injection 4 mg    cefTRIAXone (ROCEPHIN) 1,000 mg in sodium chloride 0.9 % 50 mL IVPB mini-bag     Order Specific Question:   Antimicrobial Indications     Answer:   Urinary Tract Infection    heparin (porcine) injection 2,830 Units    heparin (porcine) injection 2,830 Units    heparin (porcine) injection 1,420 Units    heparin 25,000 units in dextrose 5% 250 mL (premix) infusion    metoprolol (LOPRESSOR) injection 5 mg    Cholecalciferol TABS 2,000 Units    acetaminophen (TYLENOL) tablet 1,000 mg    cholestyramine light packet 4 g    ezetimibe (ZETIA) tablet 10 mg    famotidine (PEPCID) tablet 40 mg    folic acid (FOLVITE) tablet 5 mg    magnesium (MAGNESIUM-OXIDE) tablet 250 mg oxybutynin (DITROPAN-XL) extended release tablet 10 mg    oxyCODONE (ROXICODONE) immediate release tablet 5 mg    pantoprazole (PROTONIX) tablet 40 mg    sodium chloride flush 0.9 % injection 5-40 mL    sodium chloride flush 0.9 % injection 5-40 mL    0.9 % sodium chloride infusion    OR Linked Order Group     ondansetron (ZOFRAN-ODT) disintegrating tablet 4 mg     ondansetron (ZOFRAN) injection 4 mg    polyethylene glycol (GLYCOLAX) packet 17 g    OR Linked Order Group     acetaminophen (TYLENOL) tablet 650 mg     acetaminophen (TYLENOL) suppository 650 mg    lactated ringers infusion    hydrALAZINE (APRESOLINE) injection 10 mg    aspirin chewable tablet 81 mg         Signed:  Alreen Kwon MD    Part of this note may have been written by using a voice dictation software. The note has been proof read but may still contain some grammatical/other typographical errors.

## 2022-11-12 NOTE — ED PROVIDER NOTES
Emergency Department Provider Note                   PCP:                Ubaldo Robb MD               Age: 80 y.o. Sex: female       ICD-10-CM    1. Hypertensive emergency  I16.1       2. Elevated troponin  R77.8       3. Acute cystitis without hematuria  N30.00           DISPOSITION Decision To Admit 11/12/2022 04:18:35 PM       MDM  Number of Diagnoses or Management Options  Acute cystitis without hematuria  Elevated troponin  Hypertensive emergency  Diagnosis management comments: 43-year-old female presents for headache and hypertension. Vital signs are reviewed. Her blood pressure is 198/73 in triage. On reexamination in the room and come up to 209/76. With her history of aortic stenosis it is interesting to me we will do a cardiac work-up here and then obtain a CT of the head. Patient was given IV hydralazine. Patient reports when she had come back from CT scan she started developing being some epigastric abdominal pain with some was real nausea. About this time her initial troponin did come back elevated at 205. Patient was given aspirin and heparin drip was ordered. Repeat EKG was obtained which which still showed no signs of STEMI. Stat Cxr was ordered  Chest x-ray showed no evidence of any dissection wide mediastinum. I spoke with the on-call cardiologist Dr. Ohara Miss he states he was not very impressed with her story of concerns for NSTEMI and would like to have a repeat troponin before treating her for an NSTEMI. Her heparin was withheld until repeat troponin. Her repeat troponin did come back elevated from 200 up to 247. I spoke with him via [email or web address] and he is still stated that he was not very impressed about an NSTEMI. I do think our with the patient having epigastric pain and nausea she should be admitted for a minimum of serial troponins with blood pressure management.   I spoke with the hospitalist Dr. Naila Martin we discussed the patient he was agreeable for admitting the patient for hypertensive emergency, elevated troponin, UTI. Patient had been given IV hydralazine and IV metoprolol as well as IV Rocephin. Patient will be admitted in stable but guarded condition. Orders Placed This Encounter   Procedures    CT HEAD WO CONTRAST    XR CHEST PORTABLE    CBC with Auto Differential    Comprehensive Metabolic Panel    Troponin    Urinalysis w rflx microscopic    APTT    Protime-INR    Anti-Xa, Unfractionated Heparin    Troponin    APTT    Anti-Xa, Unfractionated Heparin    POCT Urine Dipstick    EKG 12 Lead    EKG 12 Lead    Insert peripheral IV        Medications   heparin (porcine) injection 2,830 Units (has no administration in time range)   heparin (porcine) injection 2,830 Units (has no administration in time range)   heparin (porcine) injection 1,420 Units (has no administration in time range)   heparin 25,000 units in dextrose 5% 250 mL (premix) infusion (has no administration in time range)   metoprolol (LOPRESSOR) injection 5 mg (has no administration in time range)   hydrALAZINE (APRESOLINE) injection 10 mg (10 mg IntraVENous Given 11/12/22 1258)   aspirin tablet 325 mg (325 mg Oral Given 11/12/22 1421)   ondansetron (ZOFRAN) injection 4 mg (4 mg IntraVENous Given 11/12/22 1433)   cefTRIAXone (ROCEPHIN) 1,000 mg in sodium chloride 0.9 % 50 mL IVPB mini-bag (0 mg IntraVENous Stopped 11/12/22 1618)       New Prescriptions    No medications on file        Risa Sheridan is a 80 y.o. female who presents to the Emergency Department with chief complaint of    Chief Complaint   Patient presents with    Headache    Hypertension      31-year-old female presents emerged department via private vehicle with a chief complaint of headache and high blood pressure. Patient reports having high blood pressure over the past 3 days in duration.   She denies any prior history of elevated blood pressure she actually states that she has had low blood pressure in the past.  She does report having a prior head bleed in the past from an unknown cause with seen on CT scan she denied any new trauma. She denies being on any current blood thinners. She reports no motor or sensation deficits. Patient also had a recent echocardiogram she states that she is followed by cardiologist and has follow-up appointment in 2 days she has a history of mitral valve prolapse and aortic stenosis. All other systems reviewed and are negative unless otherwise stated in the history of present illness section. Review of Systems   Constitutional:  Negative for activity change, chills and fever. HENT:  Negative for dental problem, drooling, facial swelling, sore throat, trouble swallowing and voice change. Eyes:  Negative for pain. Respiratory:  Negative for cough, chest tightness and shortness of breath. Cardiovascular:  Negative for chest pain and palpitations. Gastrointestinal:  Negative for abdominal pain, nausea and vomiting. Endocrine: Negative for polydipsia. Genitourinary:  Negative for difficulty urinating, dysuria and hematuria. Musculoskeletal:  Negative for back pain and neck pain. Skin:  Negative for rash and wound. Neurological:  Positive for headaches. Negative for dizziness, seizures, facial asymmetry, speech difficulty and numbness. Psychiatric/Behavioral:  Negative for agitation and behavioral problems.       Past Medical History:   Diagnosis Date    Aortic regurgitation 9/9/2015    Cho done 9/1/2015     Aortic valve stenosis, mild 10/7/2015    Chest pain 1/20/2016    CHF (congestive heart failure) ([facility] [address] 2004    Chronic pain     Diverticulosis of colon (without mention of hemorrhage) 09/30/2014    Noted on colonoscopy 2010, no Viktor negron    Essential hypertension, benign 09/30/2014    Facet arthropathy, lumbar     Family history of colonic polyps 12/28/2015    GERD (gastroesophageal reflux disease)     Hearing loss 2011    Hip fracture ([facility] [address] 08/2021    Impaired fasting glucose 09/30/2014    Lactose intolerance in adult 09/30/2014    Malaise and fatigue 1/20/2016    Mild intermittent asthma 09/30/2014    MVP (mitral valve prolapse)     Osteoarthritis     Pure hypercholesterolemia 9/30/2014    Rheumatoid arthritis involving multiple joints ([address] 09/30/2014    Dr. Joe Holcomb for colon cancer 12/28/2015    Senile osteoporosis 9/30/2014    Spinal stenosis of lumbar region     Urinary incontinence 09/30/2014        Past Surgical History:   Procedure Laterality Date    CATARACT REMOVAL Right     9/8/2011    CHOLECYSTECTOMY  07/08/2011    COLONOSCOPY  07/2020    . No more colonoscopies due to age.     HIP FRACTURE SURGERY  08/16/2021    HYSTERECTOMY  01/28/1986    total abdominal    OTHER SURGICAL HISTORY      cauterization of a vessle in nose 2018        Family History   Problem Relation Age of Onset    High Cholesterol Mother     Hypertension Mother     Rheum Arthritis Mother     Anemia Mother     Hearing Loss Mother     Heart Disease Mother     Osteoporosis Mother     Diabetes Father     Colon Polyps Other     Parkinson's Disease Other     Hypertension Other     High Cholesterol Other     Heart Disease Other     Diabetes Other         Social History     Socioeconomic History    Marital status: Single   Tobacco Use    Smoking status: Never    Smokeless tobacco: Never   Vaping Use    Vaping Use: Never used   Substance and Sexual Activity    Alcohol use: No     Alcohol/week: 0.0 standard drinks    Drug use: No    Sexual activity: Not Currently     Partners: Male     Social Determinants of Health     Physical Activity: Unknown    Days of Exercise per Week: 0 days        Allergies: Amoxicillin, Azithromycin, Benazepril, Celecoxib, Cyclobenzaprine, Ergotamine, Fluvastatin, Ibandronic acid, Lactose, Lotrel [amlodipine besy-benazepril hcl], Other, Penbritin-s [ampicillin], Pravastatin, Risedronate, Rosuvastatin, Shellfish allergy, Simvastatin, Sulfa antibiotics, Sulfisoxazole, Tetracycline, Trospium, Adhesive tape, and Amlodipine    Previous Medications    ACETAMINOPHEN (TYLENOL) 650 MG EXTENDED RELEASE TABLET    Take 650 mg by mouth 2 times daily    ASCORBIC ACID (VITAMIN C) 500 MG TABLET    Take 500 mg by mouth 2 times daily    CALCIUM CITRATE-VITAMIN D 250-200 MG-UNIT TABS    Take by mouth daily    COENZYME Q10 100 MG CAPS CAPSULE    Take 100 mg by mouth daily    COLESTIPOL (COLESTID) 1 G TABLET    Take 1 tablet by mouth daily TAKE 1 TABLET BY MOUTH EVERY DAY    ESTRADIOL (ESTRACE) 0.1 MG/GM VAGINAL CREAM    Place 1 g vaginally once a week    EZETIMIBE (ZETIA) 10 MG TABLET    Take 1 tablet by mouth in the morning. TAKE 1 TABLET BY MOUTH EVERY DAY. FAMOTIDINE (PEPCID) 40 MG TABLET    TAKE 1 TABLET BY MOUTH EVERY DAY AT NIGHT    FOLIC ACID (FOLVITE) 1 MG TABLET    Takes 5 tabs qd    FUROSEMIDE (LASIX) 20 MG TABLET    TAKE 1 TABLET BY MOUTH EVERY DAY    MAGNESIUM (MAGNESIUM-OXIDE) 250 MG TABS TABLET    Take 250 mg by mouth Daily with supper    MENAQUINONE-7 (VITAMIN K2 PO)    Take by mouth    METHOTREXATE (RHEUMATREX) 2.5 MG CHEMO TABLET    TAKE 4 TABLETS BY MOUTH ONCE A WEEK    MULTIPLE VITAMINS-MINERALS (CENTRUM SILVER 50+WOMEN PO)    Take by mouth daily    NIACIN (NIASPAN) 500 MG EXTENDED RELEASE TABLET    Take 500 mg by mouth daily    OXYBUTYNIN (DITROPAN-XL) 10 MG EXTENDED RELEASE TABLET    10 mg daily    OXYCODONE (ROXICODONE) 5 MG IMMEDIATE RELEASE TABLET    TAKE 1/2 TO 1 (ONE-HALF TO ONE) TABLET BY MOUTH ONCE DAILY AS NEEDED FOR SEVERE EPISODE PAIN    PANTOPRAZOLE (PROTONIX) 40 MG TABLET    TAKE 1 TABLET BY MOUTH EVERY DAY    PREGABALIN (LYRICA) 50 MG CAPSULE    Take 50 mg by mouth every 12 hours. UNABLE TO FIND    2 times daily Fish oil /Omega 3 vegan liquid gel caps.     UNABLE TO FIND    daily Apple Pectin 500 mg take with niacin    UNABLE TO FIND    in the morning and at bedtime Ginko Biloba 60 mg one tablet BID    UNABLE TO FIND    Take 2 oz by mouth every morning (before breakfast)    UNABLE TO FIND    Rolator walker light    VITAMIN D3 (CHOLECALCIFEROL) 125 MCG (5000 UT) TABS TABLET    Take by mouth daily        Vitals signs and nursing note reviewed. Patient Vitals for the past 4 hrs:   Pulse Resp BP SpO2   11/12/22 1612 (!) 106 21 (!) 162/67 96 %   11/12/22 1522 (!) 101 -- (!) 162/70 97 %   11/12/22 1353 (!) 101 -- (!) 147/65 97 %   11/12/22 1332 100 16 (!) 167/71 100 %   11/12/22 1255 88 -- -- --   11/12/22 1253 -- 16 (!) 189/80 --          Physical Exam  Vitals and nursing note reviewed. Constitutional:       General: She is not in acute distress. Appearance: Normal appearance. She is not ill-appearing. HENT:      Head: Normocephalic and atraumatic. Right Ear: Tympanic membrane normal.      Left Ear: Tympanic membrane normal.      Mouth/Throat:      Mouth: Mucous membranes are moist.      Pharynx: Oropharynx is clear. Eyes:      Extraocular Movements: Extraocular movements intact. Pupils: Pupils are equal, round, and reactive to light. Neck:      Comments: JVD present  Cardiovascular:      Rate and Rhythm: Normal rate and regular rhythm. Heart sounds: Murmur heard. Pulmonary:      Effort: No respiratory distress. Breath sounds: No wheezing or rhonchi. Abdominal:      Palpations: Abdomen is soft. There is no mass. Tenderness: There is no abdominal tenderness. There is no guarding. Musculoskeletal:         General: No swelling or tenderness. Cervical back: Normal range of motion and neck supple. No rigidity or tenderness. Skin:     General: Skin is warm and dry. Capillary Refill: Capillary refill takes less than 2 seconds. Neurological:      General: No focal deficit present. Mental Status: She is alert and oriented to person, place, and time. Mental status is at baseline.       Comments: GCS 15, ANO x3 to person place and time, cranial nerves II through XII intact, motor 5 out of 5 in upper and lower extremities, sensation 5 out of 5 in the upper and lower extremities, DTR 2+ bilaterally,  normal heel-to-shin, normal finger-nose. Normal gait. Psychiatric:         Mood and Affect: Mood normal.         Behavior: Behavior normal.        Procedures    ED EKG Interpretation  EKG was interpreted in the absence of a cardiologist.    12:53 PM showed normal sinus rhythm with a ventricular rate of 82 bpm, parable 138, QRS 80, QTc 427, no ST segment elevation or depression noted no signs of acute ischemia. 14:20   Sinus tachycardia ventricular rate 108 bpm, parable 148, QRS 76, QTc 463, some slight ST depression in lead II, V5, V6. No acute STEMI. Results for orders placed or performed during the hospital encounter of 11/12/22   CT HEAD WO CONTRAST    Narrative    CT of the head without contrast.    CLINICAL INDICATION: Constant headache, high blood pressure    PROCEDURE: Serial thin section axial images are obtained from the cranial vertex  through the skull base without the administration of intravenous contrast.   Radiation dose reduction techniques were used for this study. Our CT scanners  use one or all of the following: Automated exposure control, adjusted of the mA  and/or kV according to patient size, iterative reconstruction    COMPARISON: No prior. FINDINGS: There is no acute intracranial hemorrhage, mass, or mass effect. No  abnormal extra-axial fluid collections identified. There is no hydrocephalus. The basilar cisterns are widely patent. There is a focal area of rounded  hypoattenuation in the right centrum semiovale adjacent to the frontal horn of  the lateral ventricle. Otherwise the gray-white brain parenchymal interface is  well-maintained. No skull fracture or aggressive osseous lesion noted. There  is complete opacification of the right maxillary sinus. The remainder of the  paranasal sinuses are clear. The mastoid air cells are clear. The middle ear  cavities are clear. Impression    1. No acute intracranial abnormality. 2.  Rounded focal area of hypoattenuation in the right centrum semiovale may be  a small old infarct. 3.  Dense opacification of the right maxillary sinus likely representing chronic  sinusitis. XR CHEST PORTABLE    Narrative    Portable chest x-ray    CLINICAL INDICATION: NSTEMI    FINDINGS: Single AP view the chest submitted without comparison show the lungs  to be expanded and clear. No pleural effusion or pneumothorax. The cardiac  silhouette and mediastinum are unremarkable. The bones are osteopenic. Impression    No acute abnormality.    CBC with Auto Differential   Result Value Ref Range    WBC 5.0 4.3 - 11.1 K/uL    RBC 3.91 (L) 4.05 - 5.2 M/uL    Hemoglobin 12.5 11.7 - 15.4 g/dL    Hematocrit 38.5 35.8 - 46.3 %    MCV 98.5 82.0 - 102.0 FL    MCH 32.0 26.1 - 32.9 PG    MCHC 32.5 31.4 - 35.0 g/dL    RDW 16.9 (H) 11.9 - 14.6 %    Platelets 530 (L) 750 - 450 K/uL    MPV 11.3 9.4 - 12.3 FL    nRBC 0.00 0.0 - 0.2 K/uL    Differential Type AUTOMATED      Seg Neutrophils 77 43 - 78 %    Lymphocytes 15 13 - 44 %    Monocytes 8 4.0 - 12.0 %    Eosinophils % 0 (L) 0.5 - 7.8 %    Basophils 0 0.0 - 2.0 %    Immature Granulocytes 0 0.0 - 5.0 %    Segs Absolute 3.8 1.7 - 8.2 K/UL    Absolute Lymph # 0.7 0.5 - 4.6 K/UL    Absolute Mono # 0.4 0.1 - 1.3 K/UL    Absolute Eos # 0.0 0.0 - 0.8 K/UL    Basophils Absolute 0.0 0.0 - 0.2 K/UL    Absolute Immature Granulocyte 0.0 0.0 - 0.5 K/UL   Comprehensive Metabolic Panel   Result Value Ref Range    Sodium 141 133 - 143 mmol/L    Potassium 3.5 3.5 - 5.1 mmol/L    Chloride 105 101 - 110 mmol/L    CO2 27 21 - 32 mmol/L    Anion Gap 9 2 - 11 mmol/L    Glucose 87 65 - 100 mg/dL    BUN 17 8 - 23 MG/DL    Creatinine 0.83 0.6 - 1.0 MG/DL    Est, Glom Filt Rate >60 >60 ml/min/1.73m2    Calcium 10.0 8.3 - 10.4 MG/DL    Total Bilirubin 0.8 0.2 - 1.1 MG/DL    ALT 23 12 - 65 U/L    AST 38 (H) 15 - 37 U/L    Alk Phosphatase 94 50 - 130 U/L    Total Protein 7.4 6.3 - 8.2 g/dL    Albumin 3.4 3.2 - 4.6 g/dL    Globulin 4.0 2.8 - 4.5 g/dL    Albumin/Globulin Ratio 0.9 0.4 - 1.6     Troponin   Result Value Ref Range    Troponin, High Sensitivity 205.4 (HH) 0 - 14 pg/mL   Urinalysis w rflx microscopic   Result Value Ref Range    Color, UA DARK YELLOW      Appearance CLOUDY      Specific Gravity, UA 1.025 (H) 1.001 - 1.023      pH, Urine 5.5 5.0 - 9.0      Protein, UA TRACE (A) NEG mg/dL    Glucose, UA Negative mg/dL    Ketones, Urine 15 (A) NEG mg/dL    Bilirubin Urine Negative NEG      Blood, Urine Negative NEG      Urobilinogen, Urine 0.2 0.2 - 1.0 EU/dL    Nitrite, Urine Positive (A) NEG      Leukocyte Esterase, Urine MODERATE (A) NEG      WBC, UA 20-50 0 /hpf    BACTERIA, URINE 4+ (H) 0 /hpf   APTT   Result Value Ref Range    PTT 27.9 24.5 - 34.2 SEC   Protime-INR   Result Value Ref Range    Protime 12.4 (L) 12.6 - 14.3 sec    INR 0.9     Troponin   Result Value Ref Range    Troponin, High Sensitivity 247.2 (HH) 0 - 14 pg/mL   EKG 12 Lead   Result Value Ref Range    Ventricular Rate 82 BPM    Atrial Rate 82 BPM    P-R Interval 138 ms    QRS Duration 80 ms    Q-T Interval 366 ms    QTc Calculation (Bazett) 427 ms    P Axis 72 degrees    R Axis 3 degrees    T Axis 58 degrees    Diagnosis !! AGE AND GENDER SPECIFIC ECG ANALYSIS !!    EKG 12 Lead   Result Value Ref Range    Ventricular Rate 108 BPM    Atrial Rate 108 BPM    P-R Interval 148 ms    QRS Duration 76 ms    Q-T Interval 346 ms    QTc Calculation (Bazett) 463 ms    P Axis 79 degrees    R Axis 5 degrees    T Axis 69 degrees    Diagnosis !! AGE AND GENDER SPECIFIC ECG ANALYSIS !!         XR CHEST PORTABLE   Final Result   No acute abnormality. CT HEAD WO CONTRAST   Final Result   1. No acute intracranial abnormality. 2.  Rounded focal area of hypoattenuation in the right centrum semiovale may be   a small old infarct.    3.  Dense opacification of the right maxillary sinus likely representing chronic   sinusitis. Voice dictation software was used during the making of this note. This software is not perfect and grammatical and other typographical errors may be present. This note has not been completely proofread for errors.      Clark Vazquez,   11/12/22 [id number]

## 2022-11-13 ENCOUNTER — HOSPITAL ENCOUNTER (INPATIENT)
Age: 82
LOS: 2 days | Discharge: HOME | DRG: 281 | End: 2022-11-15
Attending: INTERNAL MEDICINE
Payer: MEDICARE

## 2022-11-13 VITALS
HEART RATE: 85 BPM | OXYGEN SATURATION: 96 % | TEMPERATURE: 98.1 F | SYSTOLIC BLOOD PRESSURE: 172 MMHG | RESPIRATION RATE: 16 BRPM | HEIGHT: 59 IN | WEIGHT: 103.62 LBS | BODY MASS INDEX: 20.89 KG/M2 | DIASTOLIC BLOOD PRESSURE: 68 MMHG

## 2022-11-13 DIAGNOSIS — M25.431 SWELLING OF RIGHT WRIST: ICD-10-CM

## 2022-11-13 DIAGNOSIS — I16.1 HYPERTENSIVE EMERGENCY: ICD-10-CM

## 2022-11-13 DIAGNOSIS — R77.8 ELEVATED TROPONIN: Primary | ICD-10-CM

## 2022-11-13 DIAGNOSIS — R07.9 CHEST PAIN: ICD-10-CM

## 2022-11-13 LAB
ALBUMIN SERPL-MCNC: 2.7 G/DL (ref 3.2–4.6)
ALBUMIN/GLOB SERPL: 0.8 {RATIO} (ref 0.4–1.6)
ALP SERPL-CCNC: 74 U/L (ref 50–130)
ALT SERPL-CCNC: 21 U/L (ref 12–65)
ANION GAP SERPL CALC-SCNC: 7 MMOL/L (ref 2–11)
AST SERPL-CCNC: 36 U/L (ref 15–37)
BASOPHILS # BLD: 0 K/UL (ref 0–0.2)
BASOPHILS NFR BLD: 1 % (ref 0–2)
BILIRUB SERPL-MCNC: 0.6 MG/DL (ref 0.2–1.1)
BUN SERPL-MCNC: 17 MG/DL (ref 8–23)
CALCIUM SERPL-MCNC: 9 MG/DL (ref 8.3–10.4)
CHLORIDE SERPL-SCNC: 106 MMOL/L (ref 101–110)
CO2 SERPL-SCNC: 28 MMOL/L (ref 21–32)
CREAT SERPL-MCNC: 0.78 MG/DL (ref 0.6–1)
DIFFERENTIAL METHOD BLD: ABNORMAL
EOSINOPHIL # BLD: 0 K/UL (ref 0–0.8)
EOSINOPHIL NFR BLD: 1 % (ref 0.5–7.8)
ERYTHROCYTE [DISTWIDTH] IN BLOOD BY AUTOMATED COUNT: 17.2 % (ref 11.9–14.6)
GLOBULIN SER CALC-MCNC: 3.2 G/DL (ref 2.8–4.5)
GLUCOSE SERPL-MCNC: 92 MG/DL (ref 65–100)
HCT VFR BLD AUTO: 33.2 % (ref 35.8–46.3)
HGB BLD-MCNC: 10.8 G/DL (ref 11.7–15.4)
IMM GRANULOCYTES # BLD AUTO: 0 K/UL (ref 0–0.5)
IMM GRANULOCYTES NFR BLD AUTO: 0 % (ref 0–5)
LYMPHOCYTES # BLD: 1.2 K/UL (ref 0.5–4.6)
LYMPHOCYTES NFR BLD: 29 % (ref 13–44)
MAGNESIUM SERPL-MCNC: 1.9 MG/DL (ref 1.8–2.4)
MCH RBC QN AUTO: 32.1 PG (ref 26.1–32.9)
MCHC RBC AUTO-ENTMCNC: 32.5 G/DL (ref 31.4–35)
MCV RBC AUTO: 98.8 FL (ref 82–102)
MONOCYTES # BLD: 0.6 K/UL (ref 0.1–1.3)
MONOCYTES NFR BLD: 14 % (ref 4–12)
NEUTS SEG # BLD: 2.2 K/UL (ref 1.7–8.2)
NEUTS SEG NFR BLD: 55 % (ref 43–78)
NRBC # BLD: 0 K/UL (ref 0–0.2)
PLATELET # BLD AUTO: 110 K/UL (ref 150–450)
PMV BLD AUTO: 10.6 FL (ref 9.4–12.3)
POTASSIUM SERPL-SCNC: 3.2 MMOL/L (ref 3.5–5.1)
PROT SERPL-MCNC: 5.9 G/DL (ref 6.3–8.2)
RBC # BLD AUTO: 3.36 M/UL (ref 4.05–5.2)
SODIUM SERPL-SCNC: 141 MMOL/L (ref 133–143)
TROPONIN I SERPL HS-MCNC: 455 PG/ML (ref 0–14)
UFH PPP CHRO-ACNC: 0.49 IU/ML (ref 0.3–0.7)
WBC # BLD AUTO: 4 K/UL (ref 4.3–11.1)

## 2022-11-13 PROCEDURE — 80053 COMPREHEN METABOLIC PANEL: CPT

## 2022-11-13 PROCEDURE — 83735 ASSAY OF MAGNESIUM: CPT

## 2022-11-13 PROCEDURE — 99222 1ST HOSP IP/OBS MODERATE 55: CPT | Performed by: INTERNAL MEDICINE

## 2022-11-13 PROCEDURE — 85025 COMPLETE CBC W/AUTO DIFF WBC: CPT

## 2022-11-13 PROCEDURE — 6370000000 HC RX 637 (ALT 250 FOR IP): Performed by: HOSPITALIST

## 2022-11-13 PROCEDURE — 6360000002 HC RX W HCPCS: Performed by: HOSPITALIST

## 2022-11-13 PROCEDURE — 85520 HEPARIN ASSAY: CPT

## 2022-11-13 PROCEDURE — 1100000003 HC PRIVATE W/ TELEMETRY

## 2022-11-13 PROCEDURE — 36415 COLL VENOUS BLD VENIPUNCTURE: CPT

## 2022-11-13 PROCEDURE — 2580000003 HC RX 258: Performed by: HOSPITALIST

## 2022-11-13 PROCEDURE — 6360000002 HC RX W HCPCS: Performed by: FAMILY MEDICINE

## 2022-11-13 RX ORDER — ASPIRIN 81 MG/1
81 TABLET, CHEWABLE ORAL DAILY
Status: CANCELLED | OUTPATIENT
Start: 2022-11-14

## 2022-11-13 RX ORDER — ONDANSETRON 2 MG/ML
4 INJECTION INTRAMUSCULAR; INTRAVENOUS EVERY 6 HOURS PRN
Status: DISCONTINUED | OUTPATIENT
Start: 2022-11-13 | End: 2022-11-15 | Stop reason: HOSPADM

## 2022-11-13 RX ORDER — POLYETHYLENE GLYCOL 3350 17 G/17G
17 POWDER, FOR SOLUTION ORAL DAILY PRN
Status: DISCONTINUED | OUTPATIENT
Start: 2022-11-13 | End: 2022-11-15 | Stop reason: HOSPADM

## 2022-11-13 RX ORDER — HYDRALAZINE HYDROCHLORIDE 20 MG/ML
10 INJECTION INTRAMUSCULAR; INTRAVENOUS EVERY 6 HOURS PRN
Status: CANCELLED | OUTPATIENT
Start: 2022-11-13

## 2022-11-13 RX ORDER — ONDANSETRON 4 MG/1
4 TABLET, ORALLY DISINTEGRATING ORAL EVERY 8 HOURS PRN
Status: CANCELLED | OUTPATIENT
Start: 2022-11-13

## 2022-11-13 RX ORDER — OXYCODONE HYDROCHLORIDE 5 MG/1
5 TABLET ORAL DAILY PRN
Status: CANCELLED | OUTPATIENT
Start: 2022-11-13

## 2022-11-13 RX ORDER — PANTOPRAZOLE SODIUM 40 MG/1
40 TABLET, DELAYED RELEASE ORAL DAILY
Status: DISCONTINUED | OUTPATIENT
Start: 2022-11-14 | End: 2022-11-15 | Stop reason: HOSPADM

## 2022-11-13 RX ORDER — SODIUM CHLORIDE 0.9 % (FLUSH) 0.9 %
5-40 SYRINGE (ML) INJECTION PRN
Status: CANCELLED | OUTPATIENT
Start: 2022-11-13

## 2022-11-13 RX ORDER — SODIUM CHLORIDE, SODIUM LACTATE, POTASSIUM CHLORIDE, CALCIUM CHLORIDE 600; 310; 30; 20 MG/100ML; MG/100ML; MG/100ML; MG/100ML
INJECTION, SOLUTION INTRAVENOUS CONTINUOUS
Status: DISCONTINUED | OUTPATIENT
Start: 2022-11-13 | End: 2022-11-15

## 2022-11-13 RX ORDER — PANTOPRAZOLE SODIUM 40 MG/1
40 TABLET, DELAYED RELEASE ORAL DAILY
Status: CANCELLED | OUTPATIENT
Start: 2022-11-14

## 2022-11-13 RX ORDER — ONDANSETRON 4 MG/1
4 TABLET, ORALLY DISINTEGRATING ORAL EVERY 8 HOURS PRN
Status: DISCONTINUED | OUTPATIENT
Start: 2022-11-13 | End: 2022-11-15 | Stop reason: HOSPADM

## 2022-11-13 RX ORDER — HEPARIN SODIUM 1000 [USP'U]/ML
60 INJECTION, SOLUTION INTRAVENOUS; SUBCUTANEOUS PRN
Status: CANCELLED | OUTPATIENT
Start: 2022-11-13

## 2022-11-13 RX ORDER — SODIUM CHLORIDE 9 MG/ML
INJECTION, SOLUTION INTRAVENOUS PRN
Status: DISCONTINUED | OUTPATIENT
Start: 2022-11-13 | End: 2022-11-15 | Stop reason: HOSPADM

## 2022-11-13 RX ORDER — POLYETHYLENE GLYCOL 3350 17 G/17G
17 POWDER, FOR SOLUTION ORAL DAILY PRN
Status: CANCELLED | OUTPATIENT
Start: 2022-11-13

## 2022-11-13 RX ORDER — FAMOTIDINE 20 MG/1
20 TABLET, FILM COATED ORAL NIGHTLY
Status: DISCONTINUED | OUTPATIENT
Start: 2022-11-13 | End: 2022-11-15 | Stop reason: HOSPADM

## 2022-11-13 RX ORDER — ONDANSETRON 2 MG/ML
4 INJECTION INTRAMUSCULAR; INTRAVENOUS EVERY 6 HOURS PRN
Status: CANCELLED | OUTPATIENT
Start: 2022-11-13

## 2022-11-13 RX ORDER — OXYCODONE HYDROCHLORIDE 5 MG/1
5 TABLET ORAL DAILY PRN
Status: DISCONTINUED | OUTPATIENT
Start: 2022-11-13 | End: 2022-11-15 | Stop reason: HOSPADM

## 2022-11-13 RX ORDER — CHOLESTYRAMINE LIGHT 4 G/5.7G
4 POWDER, FOR SUSPENSION ORAL DAILY
Status: DISCONTINUED | OUTPATIENT
Start: 2022-11-14 | End: 2022-11-14

## 2022-11-13 RX ORDER — VITAMIN B COMPLEX
2000 TABLET ORAL
Status: CANCELLED | OUTPATIENT
Start: 2022-11-14

## 2022-11-13 RX ORDER — VITAMIN B COMPLEX
2000 TABLET ORAL
Status: DISCONTINUED | OUTPATIENT
Start: 2022-11-14 | End: 2022-11-15 | Stop reason: HOSPADM

## 2022-11-13 RX ORDER — FAMOTIDINE 20 MG/1
20 TABLET, FILM COATED ORAL NIGHTLY
Status: CANCELLED | OUTPATIENT
Start: 2022-11-13

## 2022-11-13 RX ORDER — LANOLIN ALCOHOL/MO/W.PET/CERES
400 CREAM (GRAM) TOPICAL
Status: CANCELLED | OUTPATIENT
Start: 2022-11-13

## 2022-11-13 RX ORDER — SODIUM CHLORIDE 0.9 % (FLUSH) 0.9 %
5-40 SYRINGE (ML) INJECTION PRN
Status: DISCONTINUED | OUTPATIENT
Start: 2022-11-13 | End: 2022-11-15 | Stop reason: HOSPADM

## 2022-11-13 RX ORDER — OXYBUTYNIN CHLORIDE 10 MG/1
10 TABLET, EXTENDED RELEASE ORAL DAILY
Status: DISCONTINUED | OUTPATIENT
Start: 2022-11-14 | End: 2022-11-15 | Stop reason: HOSPADM

## 2022-11-13 RX ORDER — CARVEDILOL 6.25 MG/1
6.25 TABLET ORAL 2 TIMES DAILY WITH MEALS
Status: DISCONTINUED | OUTPATIENT
Start: 2022-11-14 | End: 2022-11-15 | Stop reason: HOSPADM

## 2022-11-13 RX ORDER — SODIUM CHLORIDE 9 MG/ML
INJECTION, SOLUTION INTRAVENOUS PRN
Status: CANCELLED | OUTPATIENT
Start: 2022-11-13

## 2022-11-13 RX ORDER — CHOLESTYRAMINE LIGHT 4 G/5.7G
4 POWDER, FOR SUSPENSION ORAL DAILY
Status: CANCELLED | OUTPATIENT
Start: 2022-11-14

## 2022-11-13 RX ORDER — SODIUM CHLORIDE 0.9 % (FLUSH) 0.9 %
5-40 SYRINGE (ML) INJECTION EVERY 12 HOURS SCHEDULED
Status: CANCELLED | OUTPATIENT
Start: 2022-11-13

## 2022-11-13 RX ORDER — FOLIC ACID 1 MG/1
5 TABLET ORAL DAILY
Status: DISCONTINUED | OUTPATIENT
Start: 2022-11-14 | End: 2022-11-15 | Stop reason: HOSPADM

## 2022-11-13 RX ORDER — HYDRALAZINE HYDROCHLORIDE 20 MG/ML
10 INJECTION INTRAMUSCULAR; INTRAVENOUS EVERY 6 HOURS PRN
Status: DISCONTINUED | OUTPATIENT
Start: 2022-11-13 | End: 2022-11-15 | Stop reason: HOSPADM

## 2022-11-13 RX ORDER — POTASSIUM CHLORIDE 7.45 MG/ML
10 INJECTION INTRAVENOUS
Status: DISCONTINUED | OUTPATIENT
Start: 2022-11-13 | End: 2022-11-13

## 2022-11-13 RX ORDER — HEPARIN SODIUM 1000 [USP'U]/ML
60 INJECTION, SOLUTION INTRAVENOUS; SUBCUTANEOUS PRN
Status: DISCONTINUED | OUTPATIENT
Start: 2022-11-13 | End: 2022-11-15

## 2022-11-13 RX ORDER — POTASSIUM CHLORIDE 7.45 MG/ML
10 INJECTION INTRAVENOUS
Status: CANCELLED | OUTPATIENT
Start: 2022-11-13 | End: 2022-11-13

## 2022-11-13 RX ORDER — LANOLIN ALCOHOL/MO/W.PET/CERES
400 CREAM (GRAM) TOPICAL
Status: DISCONTINUED | OUTPATIENT
Start: 2022-11-13 | End: 2022-11-15 | Stop reason: HOSPADM

## 2022-11-13 RX ORDER — OXYBUTYNIN CHLORIDE 10 MG/1
10 TABLET, EXTENDED RELEASE ORAL DAILY
Status: CANCELLED | OUTPATIENT
Start: 2022-11-14

## 2022-11-13 RX ORDER — ASPIRIN 81 MG/1
81 TABLET, CHEWABLE ORAL DAILY
Status: DISCONTINUED | OUTPATIENT
Start: 2022-11-14 | End: 2022-11-15 | Stop reason: HOSPADM

## 2022-11-13 RX ORDER — SODIUM CHLORIDE 0.9 % (FLUSH) 0.9 %
5-40 SYRINGE (ML) INJECTION EVERY 12 HOURS SCHEDULED
Status: DISCONTINUED | OUTPATIENT
Start: 2022-11-13 | End: 2022-11-15 | Stop reason: HOSPADM

## 2022-11-13 RX ORDER — MORPHINE SULFATE 2 MG/ML
1 INJECTION, SOLUTION INTRAMUSCULAR; INTRAVENOUS EVERY 4 HOURS PRN
Status: DISCONTINUED | OUTPATIENT
Start: 2022-11-13 | End: 2022-11-13 | Stop reason: HOSPADM

## 2022-11-13 RX ORDER — ACETAMINOPHEN 500 MG
1000 TABLET ORAL EVERY 8 HOURS
Status: DISCONTINUED | OUTPATIENT
Start: 2022-11-13 | End: 2022-11-15 | Stop reason: HOSPADM

## 2022-11-13 RX ORDER — HYDRALAZINE HYDROCHLORIDE 25 MG/1
25 TABLET, FILM COATED ORAL EVERY 12 HOURS SCHEDULED
Status: DISCONTINUED | OUTPATIENT
Start: 2022-11-13 | End: 2022-11-15

## 2022-11-13 RX ORDER — ACETAMINOPHEN 325 MG/1
650 TABLET ORAL EVERY 6 HOURS PRN
Status: DISCONTINUED | OUTPATIENT
Start: 2022-11-13 | End: 2022-11-15 | Stop reason: HOSPADM

## 2022-11-13 RX ORDER — ACETAMINOPHEN 650 MG/1
650 SUPPOSITORY RECTAL EVERY 6 HOURS PRN
Status: CANCELLED | OUTPATIENT
Start: 2022-11-13

## 2022-11-13 RX ORDER — HYDRALAZINE HYDROCHLORIDE 25 MG/1
25 TABLET, FILM COATED ORAL EVERY 12 HOURS SCHEDULED
Status: CANCELLED | OUTPATIENT
Start: 2022-11-13

## 2022-11-13 RX ORDER — ACETAMINOPHEN 650 MG/1
650 SUPPOSITORY RECTAL EVERY 6 HOURS PRN
Status: DISCONTINUED | OUTPATIENT
Start: 2022-11-13 | End: 2022-11-15 | Stop reason: HOSPADM

## 2022-11-13 RX ORDER — HEPARIN SODIUM 1000 [USP'U]/ML
30 INJECTION, SOLUTION INTRAVENOUS; SUBCUTANEOUS PRN
Status: CANCELLED | OUTPATIENT
Start: 2022-11-13

## 2022-11-13 RX ORDER — ACETAMINOPHEN 325 MG/1
650 TABLET ORAL EVERY 6 HOURS PRN
Status: CANCELLED | OUTPATIENT
Start: 2022-11-13

## 2022-11-13 RX ORDER — ACETAMINOPHEN 500 MG
1000 TABLET ORAL EVERY 8 HOURS
Status: CANCELLED | OUTPATIENT
Start: 2022-11-13

## 2022-11-13 RX ORDER — SODIUM CHLORIDE, SODIUM LACTATE, POTASSIUM CHLORIDE, CALCIUM CHLORIDE 600; 310; 30; 20 MG/100ML; MG/100ML; MG/100ML; MG/100ML
INJECTION, SOLUTION INTRAVENOUS CONTINUOUS
Status: CANCELLED | OUTPATIENT
Start: 2022-11-13

## 2022-11-13 RX ORDER — MORPHINE SULFATE 2 MG/ML
1 INJECTION, SOLUTION INTRAMUSCULAR; INTRAVENOUS ONCE
Status: COMPLETED | OUTPATIENT
Start: 2022-11-13 | End: 2022-11-13

## 2022-11-13 RX ORDER — POTASSIUM CHLORIDE 7.45 MG/ML
10 INJECTION INTRAVENOUS
Status: COMPLETED | OUTPATIENT
Start: 2022-11-13 | End: 2022-11-13

## 2022-11-13 RX ORDER — FOLIC ACID 1 MG/1
5 TABLET ORAL DAILY
Status: CANCELLED | OUTPATIENT
Start: 2022-11-14

## 2022-11-13 RX ORDER — HEPARIN SODIUM 1000 [USP'U]/ML
30 INJECTION, SOLUTION INTRAVENOUS; SUBCUTANEOUS PRN
Status: DISCONTINUED | OUTPATIENT
Start: 2022-11-13 | End: 2022-11-15

## 2022-11-13 RX ORDER — HEPARIN SODIUM 10000 [USP'U]/100ML
5-30 INJECTION, SOLUTION INTRAVENOUS CONTINUOUS
Status: DISCONTINUED | OUTPATIENT
Start: 2022-11-13 | End: 2022-11-14

## 2022-11-13 RX ORDER — HEPARIN SODIUM 10000 [USP'U]/100ML
5-30 INJECTION, SOLUTION INTRAVENOUS CONTINUOUS
Status: CANCELLED | OUTPATIENT
Start: 2022-11-13

## 2022-11-13 RX ORDER — EZETIMIBE 10 MG/1
10 TABLET ORAL DAILY
Status: CANCELLED | OUTPATIENT
Start: 2022-11-14

## 2022-11-13 RX ORDER — EZETIMIBE 10 MG/1
10 TABLET ORAL DAILY
Status: DISCONTINUED | OUTPATIENT
Start: 2022-11-14 | End: 2022-11-15 | Stop reason: HOSPADM

## 2022-11-13 RX ADMIN — SODIUM CHLORIDE, PRESERVATIVE FREE 10 ML: 5 INJECTION INTRAVENOUS at 09:11

## 2022-11-13 RX ADMIN — SODIUM CHLORIDE, PRESERVATIVE FREE 10 ML: 5 INJECTION INTRAVENOUS at 20:10

## 2022-11-13 RX ADMIN — HEPARIN SODIUM 12 UNITS/KG/HR: 10000 INJECTION, SOLUTION INTRAVENOUS at 16:54

## 2022-11-13 RX ADMIN — ACETAMINOPHEN 1000 MG: 500 TABLET, FILM COATED ORAL at 18:07

## 2022-11-13 RX ADMIN — HYDRALAZINE HYDROCHLORIDE 25 MG: 25 TABLET, FILM COATED ORAL at 09:08

## 2022-11-13 RX ADMIN — FAMOTIDINE 20 MG: 20 TABLET, FILM COATED ORAL at 20:07

## 2022-11-13 RX ADMIN — ASPIRIN 81 MG: 81 TABLET, CHEWABLE ORAL at 09:08

## 2022-11-13 RX ADMIN — FOLIC ACID 5 MG: 1 TABLET ORAL at 09:08

## 2022-11-13 RX ADMIN — Medication 400 MG: at 16:58

## 2022-11-13 RX ADMIN — ACETAMINOPHEN 650 MG: 325 TABLET, FILM COATED ORAL at 08:34

## 2022-11-13 RX ADMIN — CEFTRIAXONE 1000 MG: 1 INJECTION, POWDER, FOR SOLUTION INTRAMUSCULAR; INTRAVENOUS at 16:45

## 2022-11-13 RX ADMIN — POTASSIUM CHLORIDE 10 MEQ: 7.46 INJECTION, SOLUTION INTRAVENOUS at 17:32

## 2022-11-13 RX ADMIN — OXYBUTYNIN CHLORIDE 10 MG: 10 TABLET, EXTENDED RELEASE ORAL at 09:08

## 2022-11-13 RX ADMIN — MORPHINE SULFATE 1 MG: 2 INJECTION, SOLUTION INTRAMUSCULAR; INTRAVENOUS at 14:51

## 2022-11-13 RX ADMIN — OXYCODONE 5 MG: 5 TABLET ORAL at 11:27

## 2022-11-13 RX ADMIN — PANTOPRAZOLE SODIUM 40 MG: 40 TABLET, DELAYED RELEASE ORAL at 09:07

## 2022-11-13 RX ADMIN — POTASSIUM CHLORIDE 10 MEQ: 7.46 INJECTION, SOLUTION INTRAVENOUS at 09:11

## 2022-11-13 RX ADMIN — ONDANSETRON 4 MG: 2 INJECTION INTRAMUSCULAR; INTRAVENOUS at 14:53

## 2022-11-13 RX ADMIN — POTASSIUM CHLORIDE 10 MEQ: 7.46 INJECTION, SOLUTION INTRAVENOUS at 11:27

## 2022-11-13 RX ADMIN — EZETIMIBE 10 MG: 10 TABLET ORAL at 09:08

## 2022-11-13 RX ADMIN — SODIUM CHLORIDE, SODIUM LACTATE, POTASSIUM CHLORIDE, AND CALCIUM CHLORIDE: 600; 310; 30; 20 INJECTION, SOLUTION INTRAVENOUS at 16:55

## 2022-11-13 RX ADMIN — HYDRALAZINE HYDROCHLORIDE 25 MG: 25 TABLET, FILM COATED ORAL at 20:07

## 2022-11-13 ASSESSMENT — PAIN SCALES - GENERAL
PAINLEVEL_OUTOF10: 6
PAINLEVEL_OUTOF10: 0
PAINLEVEL_OUTOF10: 8
PAINLEVEL_OUTOF10: 3
PAINLEVEL_OUTOF10: 0
PAINLEVEL_OUTOF10: 0
PAINLEVEL_OUTOF10: 3
PAINLEVEL_OUTOF10: 0

## 2022-11-13 ASSESSMENT — ENCOUNTER SYMPTOMS
NAUSEA: 0
VOMITING: 0
RESPIRATORY NEGATIVE: 1
SHORTNESS OF BREATH: 0
ALLERGIC/IMMUNOLOGIC NEGATIVE: 1
ABDOMINAL PAIN: 1
EYES NEGATIVE: 1
COUGH: 0

## 2022-11-13 ASSESSMENT — PAIN DESCRIPTION - DESCRIPTORS: DESCRIPTORS: TENDER

## 2022-11-13 ASSESSMENT — PAIN - FUNCTIONAL ASSESSMENT: PAIN_FUNCTIONAL_ASSESSMENT: ACTIVITIES ARE NOT PREVENTED

## 2022-11-13 ASSESSMENT — PAIN DESCRIPTION - LOCATION
LOCATION: GENERALIZED
LOCATION: HEAD

## 2022-11-13 NOTE — PROGRESS NOTES
TRANSFER - IN REPORT:    Verbal report received from [address] [place] [address], RN on [employer] being received from ED [facility]) for routine progression of care. Report consisted of patients Situation, Background, Assessment and Recommendations(SBAR). Information from the following report(s) SBAR, ED Summary, Recent Results, and Cardiac Rhythm SR  was reviewed. Opportunity for questions and clarification was provided. Assessment completed upon patients arrival to unit and care assumed. Patient received to room 319. Patient connected to monitor and assessment completed. Plan of care reviewed. Patient oriented to room and call light. Patient aware to use call light to communicate any chest pain or needs. Admission skin assessment completed with second RN and reveals the following: Skin is generally warm, dry and fragile. Sacrum and heels free of breakdown and redness. No edema noted.

## 2022-11-13 NOTE — PROGRESS NOTES
Patient complains of chest discomfort. EKG done. VSS. Patient states\" it feels more like indigestion\". Will notify Dr. Dane Wagner.

## 2022-11-13 NOTE — DISCHARGE SUMMARY
Please refer to progress note from earlier today. Patient being [place] for further evaluation management of elevated troponin/NSTEMI.

## 2022-11-13 NOTE — ED NOTES
TRANSFER - OUT REPORT:    Verbal report given to Raiza Morris RN on [employer]  being transferred to [phone] for urgent transfer       Report consisted of patient's Situation, Background, Assessment and   Recommendations(SBAR). Information from the following report(s) Nurse Handoff Report, Index, ED Encounter Summary, ED SBAR, Adult Overview, MAR, and Recent Results was reviewed with the receiving nurse. Chancellor Assessment: Presents to emergency department  because of falls (Syncope, seizure, or loss of consciousness): No, Age > 79: No, Altered Mental Status, Intoxication with alcohol or substance confusion (Disorientation, impaired judgment, poor safety awaremess, or inability to follow instructions): No, Impaired Mobility: Ambulates or transfers with assistive devices or assistance; Unable to ambulate or transer.: No, Nursing Judgement: No  Lines:   Peripheral IV 11/12/22 Right Antecubital (Active)        Opportunity for questions and clarification was provided.       Patient transported with:  Registered Nurse           Michelle Morton RN  11/12/22 [id number]

## 2022-11-13 NOTE — PROGRESS NOTES
Physical/Occupational Therapy Note:    PT/OT order received and chart reviewed. Per nsg pt transferring downtown for further care, per MD note in chart pt transferring to downtown for further evaluation management of elevated troponin/NSTEMI. Thank you.     Ilsa Farley, PT     Rehab Caseload Tracker

## 2022-11-13 NOTE — PROGRESS NOTES
Hospitalist Progress Note   Admit Date:  2022 12:40 PM   Name:  Robert Rendon   Age:  80 y.o. Sex:  female  :  1940   MRN:  [id number]   Room:  [facility]/    Presenting Complaint: Headache and Hypertension     Reason(s) for Admission: Chest pain [R07.9]  Elevated troponin [R77.8]  Acute cystitis without hematuria [N30.00]  Hypertensive emergency [I16.1]     Hospital Course: This is a 66-year-old female with past medical history significant for rheumatoid arthritis, aortic stenosis, presented to the ER because of headache. She was found to have uncontrolled blood pressure. Later she started complaining of epigastric pressure. Troponins were checked and elevated at 200. Cardiology was consulted. Patient started on heparin drip. Troponins were trended and were worse at 495 and subsequently trended down to 450. Discussed with cardiology and recommendations to transfer the patient to [facility] for further evaluation management. She also has urinary tract infection for which she was started on ceftriaxone. Right upper quadrant ultrasound pending. Lipase within normal limits. Subjective & 24hr Events (22): Patient reports feeling better this morning. No chest pain no palpitations. No shortness of breath. She has no epigastric pain today. Assessment & Plan: This is a 66-year-old female with    Elevated troponin/NSTEMI  Continue heparin drip. Aspirin. Patient allergic to statins. Cardiology consulted. Appreciate recommendations. Spoke with Dr. Zane Agustin and recommendations to transfer the patient to [facility]. She will be transferred pending bed availability. UTI  Ceftriaxone pending culture results. Uncontrolled hypertension  Blood pressure is 164/60 this morning. Started on hydralazine 25 mg p.o. twice daily. Patient reports she never had issues with high blood pressure before. Hydralazine as needed. Rheumatoid arthritis  Continue home medications. Outpatient follow-up. Nexium peptic ulcer disease  Protonix      Anticipated discharge needs:    Patient to be transferred to [facility] for further evaluation management. Will admit to hospitalist service and consult cardiology. Diet:  Diet NPO Exceptions are: Sips of Water with Meds, Sips of Clear Liquids  DVT PPx: Heparin drip  Code status: Full Code    Hospital Problems:  Principal Problem:    Chest pain  Active Problems:    Hypertensive emergency    Elevated troponin    Urinary incontinence    Nonrheumatic aortic valve disorder    Rheumatoid arthritis involving multiple joints (HCC)  Resolved Problems:    * No resolved hospital problems.  *      Objective:   Patient Vitals for the past 24 hrs:   Temp Pulse Resp BP SpO2   11/13/22 0713 98.3 °F (36.8 °C) 64 17 (!) 164/63 95 %   11/13/22 0600 -- 85 17 (!) 174/92 --   11/13/22 0500 -- 69 11 (!) 142/61 93 %   11/13/22 0400 -- 73 -- (!) 141/65 --   11/13/22 0300 98.3 °F (36.8 °C) 73 23 (!) 159/60 93 %   11/13/22 0200 -- 84 18 (!) 142/53 95 %   11/13/22 0100 -- 84 13 (!) 134/55 94 %   11/13/22 0000 99 °F (37.2 °C) 77 12 (!) 151/58 94 %   11/12/22 2300 -- 81 17 (!) 154/70 95 %   11/12/22 2230 -- 81 14 (!) 149/64 92 %   11/12/22 2155 -- 77 13 (!) 150/57 94 %   11/12/22 2125 -- 76 13 (!) 152/54 93 %   11/12/22 2055 -- 80 16 (!) 156/69 93 %   11/12/22 2025 -- 79 12 (!) 173/73 96 %   11/12/22 2015 98.3 °F (36.8 °C) 81 15 (!) 198/78 96 %   11/12/22 1922 -- 85 -- (!) 162/71 95 %   11/12/22 1852 -- 90 -- (!) 179/72 96 %   11/12/22 1753 -- 85 -- (!) 163/70 96 %   11/12/22 1724 -- 83 -- (!) 162/72 96 %   11/12/22 1654 -- 82 -- (!) 163/70 97 %   11/12/22 1612 -- (!) 106 21 (!) 162/67 96 %   11/12/22 1522 -- (!) 101 -- (!) 162/70 97 %   11/12/22 1353 -- (!) 101 -- (!) 147/65 97 %   11/12/22 1332 -- 100 16 (!) 167/71 100 %   11/12/22 1255 -- 88 -- -- --   11/12/22 1253 -- -- 16 (!) 189/80 --   11/12/22 1209 98.2 °F (36.8 °C) 88 16 (!) 198/73 97 %       Oxygen Therapy  SpO2: 95 %  Pulse Oximetry Type: Continuous  Pulse via Oximetry: 68 beats per minute  O2 Device: None (Room air)    Estimated body mass index is 20.93 kg/m² as calculated from the following:    Height as of this encounter: 4' 11\" (1.499 m). Weight as of this encounter: 103 lb 9.9 oz (47 kg). Intake/Output Summary (Last 24 hours) at 11/13/2022 1010  Last data filed at 11/13/2022 0530  Gross per 24 hour   Intake 746.81 ml   Output --   Net 746.81 ml         Physical Exam:     Blood pressure (!) 164/63, pulse 64, temperature 98.3 °F (36.8 °C), temperature source Oral, resp. rate 17, height 4' 11\" (1.499 m), weight 103 lb 9.9 oz (47 kg), SpO2 95 %. General:    Well nourished. Elderly female, alert, awake,  Head:  Normocephalic, atraumatic  Eyes:  Sclerae appear normal.  Pupils equally round. ENT:  Nares appear normal, no drainage. Moist oral mucosa  Neck:  No restricted ROM. Trachea midline   CV:   RRR. No m/r/g. No jugular venous distension. Lungs:   CTAB. No wheezing, rhonchi, or rales. Symmetric expansion. Abdomen: Bowel sounds present. Soft, nontender, nondistended. Extremities: No cyanosis or clubbing. No edema  Skin:     No rashes and normal coloration. Warm and dry. Neuro:  CN II-XII grossly intact. Sensation intact. A&Ox3  Psych:  Normal mood and affect.       I have personally reviewed labs and tests showing:  Recent Labs:  Recent Results (from the past 48 hour(s))   EKG 12 Lead    Collection Time: 11/12/22 12:53 PM   Result Value Ref Range    Ventricular Rate 82 BPM    Atrial Rate 82 BPM    P-R Interval 138 ms    QRS Duration 80 ms    Q-T Interval 366 ms    QTc Calculation (Bazett) 427 ms    P Axis 72 degrees    R Axis 3 degrees    T Axis 58 degrees    Diagnosis !! AGE AND GENDER SPECIFIC ECG ANALYSIS !!    CBC with Auto Differential    Collection Time: 11/12/22 12:54 PM   Result Value Ref Range    WBC 5.0 4.3 - 11.1 K/uL    RBC 3.91 (L) 4.05 - 5.2 M/uL    Hemoglobin 12.5 11.7 - 15.4 g/dL Hematocrit 38.5 35.8 - 46.3 %    MCV 98.5 82.0 - 102.0 FL    MCH 32.0 26.1 - 32.9 PG    MCHC 32.5 31.4 - 35.0 g/dL    RDW 16.9 (H) 11.9 - 14.6 %    Platelets 894 (L) 314 - 450 K/uL    MPV 11.3 9.4 - 12.3 FL    nRBC 0.00 0.0 - 0.2 K/uL    Differential Type AUTOMATED      Seg Neutrophils 77 43 - 78 %    Lymphocytes 15 13 - 44 %    Monocytes 8 4.0 - 12.0 %    Eosinophils % 0 (L) 0.5 - 7.8 %    Basophils 0 0.0 - 2.0 %    Immature Granulocytes 0 0.0 - 5.0 %    Segs Absolute 3.8 1.7 - 8.2 K/UL    Absolute Lymph # 0.7 0.5 - 4.6 K/UL    Absolute Mono # 0.4 0.1 - 1.3 K/UL    Absolute Eos # 0.0 0.0 - 0.8 K/UL    Basophils Absolute 0.0 0.0 - 0.2 K/UL    Absolute Immature Granulocyte 0.0 0.0 - 0.5 K/UL   Comprehensive Metabolic Panel    Collection Time: 11/12/22 12:54 PM   Result Value Ref Range    Sodium 141 133 - 143 mmol/L    Potassium 3.5 3.5 - 5.1 mmol/L    Chloride 105 101 - 110 mmol/L    CO2 27 21 - 32 mmol/L    Anion Gap 9 2 - 11 mmol/L    Glucose 87 65 - 100 mg/dL    BUN 17 8 - 23 MG/DL    Creatinine 0.83 0.6 - 1.0 MG/DL    Est, Glom Filt Rate >60 >60 ml/min/1.73m2    Calcium 10.0 8.3 - 10.4 MG/DL    Total Bilirubin 0.8 0.2 - 1.1 MG/DL    ALT 23 12 - 65 U/L    AST 38 (H) 15 - 37 U/L    Alk Phosphatase 94 50 - 130 U/L    Total Protein 7.4 6.3 - 8.2 g/dL    Albumin 3.4 3.2 - 4.6 g/dL    Globulin 4.0 2.8 - 4.5 g/dL    Albumin/Globulin Ratio 0.9 0.4 - 1.6     Troponin    Collection Time: 11/12/22 12:54 PM   Result Value Ref Range    Troponin, High Sensitivity 205.4 (HH) 0 - 14 pg/mL   APTT    Collection Time: 11/12/22 12:54 PM   Result Value Ref Range    PTT 27.9 24.5 - 34.2 SEC   Protime-INR    Collection Time: 11/12/22 12:54 PM   Result Value Ref Range    Protime 12.4 (L) 12.6 - 14.3 sec    INR 0.9     Urinalysis w rflx microscopic    Collection Time: 11/12/22  1:32 PM   Result Value Ref Range    Color, UA DARK YELLOW      Appearance CLOUDY      Specific Gravity, UA 1.025 (H) 1.001 - 1.023      pH, Urine 5.5 5.0 - 9.0 Protein, UA TRACE (A) NEG mg/dL    Glucose, UA Negative mg/dL    Ketones, Urine 15 (A) NEG mg/dL    Bilirubin Urine Negative NEG      Blood, Urine Negative NEG      Urobilinogen, Urine 0.2 0.2 - 1.0 EU/dL    Nitrite, Urine Positive (A) NEG      Leukocyte Esterase, Urine MODERATE (A) NEG      WBC, UA 20-50 0 /hpf    BACTERIA, URINE 4+ (H) 0 /hpf   EKG 12 Lead    Collection Time: 11/12/22  2:20 PM   Result Value Ref Range    Ventricular Rate 108 BPM    Atrial Rate 108 BPM    P-R Interval 148 ms    QRS Duration 76 ms    Q-T Interval 346 ms    QTc Calculation (Bazett) 463 ms    P Axis 79 degrees    R Axis 5 degrees    T Axis 69 degrees    Diagnosis !! AGE AND GENDER SPECIFIC ECG ANALYSIS !!     Troponin    Collection Time: 11/12/22  2:59 PM   Result Value Ref Range    Troponin, High Sensitivity 247.2 (HH) 0 - 14 pg/mL   Lipase    Collection Time: 11/12/22  2:59 PM   Result Value Ref Range    Lipase 70 (L) 73 - 393 U/L   Troponin    Collection Time: 11/12/22  8:39 PM   Result Value Ref Range    Troponin, High Sensitivity 495.2 (HH) 0 - 14 pg/mL   Anti-Xa, Unfractionated Heparin    Collection Time: 11/12/22 10:10 PM   Result Value Ref Range    Anti-XA Unfrac Heparin 0.50 0.3 - 0.7 IU/mL   Troponin    Collection Time: 11/12/22 11:38 PM   Result Value Ref Range    Troponin, High Sensitivity 455.0 (HH) 0 - 14 pg/mL   Anti-Xa, Unfractionated Heparin    Collection Time: 11/13/22  4:01 AM   Result Value Ref Range    Anti-XA Unfrac Heparin 0.49 0.3 - 0.7 IU/mL   CBC with Auto Differential    Collection Time: 11/13/22  4:01 AM   Result Value Ref Range    WBC 4.0 (L) 4.3 - 11.1 K/uL    RBC 3.36 (L) 4.05 - 5.2 M/uL    Hemoglobin 10.8 (L) 11.7 - 15.4 g/dL    Hematocrit 33.2 (L) 35.8 - 46.3 %    MCV 98.8 82.0 - 102.0 FL    MCH 32.1 26.1 - 32.9 PG    MCHC 32.5 31.4 - 35.0 g/dL    RDW 17.2 (H) 11.9 - 14.6 %    Platelets 555 (L) 284 - 450 K/uL    MPV 10.6 9.4 - 12.3 FL    nRBC 0.00 0.0 - 0.2 K/uL    Differential Type AUTOMATED Seg Neutrophils 55 43 - 78 %    Lymphocytes 29 13 - 44 %    Monocytes 14 (H) 4.0 - 12.0 %    Eosinophils % 1 0.5 - 7.8 %    Basophils 1 0.0 - 2.0 %    Immature Granulocytes 0 0.0 - 5.0 %    Segs Absolute 2.2 1.7 - 8.2 K/UL    Absolute Lymph # 1.2 0.5 - 4.6 K/UL    Absolute Mono # 0.6 0.1 - 1.3 K/UL    Absolute Eos # 0.0 0.0 - 0.8 K/UL    Basophils Absolute 0.0 0.0 - 0.2 K/UL    Absolute Immature Granulocyte 0.0 0.0 - 0.5 K/UL   Comprehensive Metabolic Panel w/ Reflex to MG    Collection Time: 11/13/22  4:01 AM   Result Value Ref Range    Sodium 141 133 - 143 mmol/L    Potassium 3.2 (L) 3.5 - 5.1 mmol/L    Chloride 106 101 - 110 mmol/L    CO2 28 21 - 32 mmol/L    Anion Gap 7 2 - 11 mmol/L    Glucose 92 65 - 100 mg/dL    BUN 17 8 - 23 MG/DL    Creatinine 0.78 0.6 - 1.0 MG/DL    Est, Glom Filt Rate >60 >60 ml/min/1.73m2    Calcium 9.0 8.3 - 10.4 MG/DL    Total Bilirubin 0.6 0.2 - 1.1 MG/DL    ALT 21 12 - 65 U/L    AST 36 15 - 37 U/L    Alk Phosphatase 74 50 - 130 U/L    Total Protein 5.9 (L) 6.3 - 8.2 g/dL    Albumin 2.7 (L) 3.2 - 4.6 g/dL    Globulin 3.2 2.8 - 4.5 g/dL    Albumin/Globulin Ratio 0.8 0.4 - 1.6     Magnesium    Collection Time: 11/13/22  4:01 AM   Result Value Ref Range    Magnesium 1.9 1.8 - 2.4 mg/dL       I have personally reviewed imaging studies showing: Other Studies:  XR CHEST PORTABLE   Final Result   No acute abnormality. CT HEAD WO CONTRAST   Final Result   1. No acute intracranial abnormality. 2.  Rounded focal area of hypoattenuation in the right centrum semiovale may be   a small old infarct. 3.  Dense opacification of the right maxillary sinus likely representing chronic   sinusitis. US ABDOMEN LIMITED Specify organ?  LIVER, SPLEEN, GALLBLADDER, PANCREAS    (Results Pending)       Current Meds:  Current Facility-Administered Medications   Medication Dose Route Frequency    morphine injection 1 mg  1 mg IntraVENous Once    potassium chloride 10 mEq/100 mL IVPB (Peripheral Line)  10 mEq IntraVENous Q1H    heparin (porcine) injection 2,830 Units  60 Units/kg IntraVENous PRN    heparin (porcine) injection 1,420 Units  30 Units/kg IntraVENous PRN    heparin 25,000 units in dextrose 5% 250 mL (premix) infusion  5-30 Units/kg/hr IntraVENous Continuous    [START ON 11/14/2022] Vitamin D (CHOLECALCIFEROL) tablet 2,000 Units  2,000 Units Oral Q MWF    acetaminophen (TYLENOL) tablet 1,000 mg  1,000 mg Oral q8h    cholestyramine light packet 4 g  4 g Oral Daily    ezetimibe (ZETIA) tablet 10 mg  10 mg Oral Daily    famotidine (PEPCID) tablet 20 mg  20 mg Oral Nightly    folic acid (FOLVITE) tablet 5 mg  5 mg Oral Daily    magnesium oxide (MAG-OX) tablet 400 mg  400 mg Oral Dinner    oxybutynin (DITROPAN-XL) extended release tablet 10 mg  10 mg Oral Daily    oxyCODONE (ROXICODONE) immediate release tablet 5 mg  5 mg Oral Daily PRN    pantoprazole (PROTONIX) tablet 40 mg  40 mg Oral Daily    sodium chloride flush 0.9 % injection 5-40 mL  5-40 mL IntraVENous 2 times per day    sodium chloride flush 0.9 % injection 5-40 mL  5-40 mL IntraVENous PRN    0.9 % sodium chloride infusion   IntraVENous PRN    ondansetron (ZOFRAN-ODT) disintegrating tablet 4 mg  4 mg Oral Q8H PRN    Or    ondansetron (ZOFRAN) injection 4 mg  4 mg IntraVENous Q6H PRN    polyethylene glycol (GLYCOLAX) packet 17 g  17 g Oral Daily PRN    acetaminophen (TYLENOL) tablet 650 mg  650 mg Oral Q6H PRN    Or    acetaminophen (TYLENOL) suppository 650 mg  650 mg Rectal Q6H PRN    lactated ringers infusion   IntraVENous Continuous    hydrALAZINE (APRESOLINE) injection 10 mg  10 mg IntraVENous Q6H PRN    aspirin chewable tablet 81 mg  81 mg Oral Daily    hydrALAZINE (APRESOLINE) tablet 25 mg  25 mg Oral 2 times per day    cefTRIAXone (ROCEPHIN) 1,000 mg in sodium chloride 0.9 % 50 mL IVPB mini-bag  1,000 mg IntraVENous Q24H       Signed:  Christopher Lopez MD    Part of this note may have been written by using a voice dictation software.   The note has been proof read but may still contain some grammatical/other typographical errors.

## 2022-11-13 NOTE — PROGRESS NOTES
Pt is being transferred [facility] for services not provided at [facility]. Called report to [facility] on CV tele. DORIAN Tipton at bedside updated. Gaudencio Burnette has arrived. Received order from Dr. Becca Perry on 1mg morphine IV q4hr for pain (7-10). Pt stated she gets carsick easily. Gave pt a dose of morphine and zofran 4mg (SEE MAR) for transport.  Libby at bedside

## 2022-11-13 NOTE — PLAN OF CARE
Problem: Discharge Planning  Goal: Discharge to home or other facility with appropriate resources  Outcome: Progressing     Problem: Pain  Goal: Verbalizes/displays adequate comfort level or baseline comfort level  Outcome: Progressing  Flowsheets (Taken 11/12/2022 2015)  Verbalizes/displays adequate comfort level or baseline comfort level:   Encourage patient to monitor pain and request assistance   Assess pain using appropriate pain scale   Administer analgesics based on type and severity of pain and evaluate response   Implement non-pharmacological measures as appropriate and evaluate response   Consider cultural and social influences on pain and pain management   Notify Licensed Independent Practitioner if interventions unsuccessful or patient reports new pain     Problem: Safety - Adult  Goal: Free from fall injury  Outcome: Progressing     Problem: ABCDS Injury Assessment  Goal: Absence of physical injury  Outcome: Progressing     Problem: Skin/Tissue Integrity  Goal: Absence of new skin breakdown  Description: 1. Monitor for areas of redness and/or skin breakdown  2. Assess vascular access sites hourly  3. Every 4-6 hours minimum:  Change oxygen saturation probe site  4. Every 4-6 hours:  If on nasal continuous positive airway pressure, respiratory therapy assess nares and determine need for appliance change or resting period.   Outcome: Progressing

## 2022-11-13 NOTE — PROGRESS NOTES
Dual skin assessment completed with Mikala SMITH RN. Skin intact. Left foot/big toe nail bed thick and coming off toe. Band aid applied. Sacral/coccyx pink and blanchable. Allevyn applied. Posterior neck, small redden area. Patient states\" that my birth grant\".

## 2022-11-13 NOTE — PROGRESS NOTES
TRANSFER - IN REPORT:    Verbal report received from [facility] on [employer]  being received from ED for routine progression of patient care      Report consisted of patient's Situation, Background, Assessment and   Recommendations(SBAR). Information from the following report(s) Nurse Handoff Report, ED Encounter Summary, ED SBAR, Intake/Output, MAR, Recent Results, and Cardiac Rhythm NSR  was reviewed with the receiving nurse. Opportunity for questions and clarification was provided. Assessment completed upon patient's arrival to unit and care assumed.

## 2022-11-13 NOTE — PROGRESS NOTES
TRANSFER - OUT REPORT:    Verbal report given to Natalie Bah on Jaymie Baptiste  being transferred to [facility] for change in patient condition (transferred [facility] due to services not provided at [facility])       Report consisted of patient's Situation, Background, Assessment and   Recommendations(SBAR). Information from the following report(s) Index, ED Encounter Summary, ED SBAR, Adult Overview, Intake/Output, MAR, Recent Results, and Cardiac Rhythm NSR  was reviewed with the receiving nurse. [place] Assessment: Presents to emergency department  because of falls (Syncope, seizure, or loss of consciousness): No, Age > 79: No, Altered Mental Status, Intoxication with alcohol or substance confusion (Disorientation, impaired judgment, poor safety awaremess, or inability to follow instructions): No, Impaired Mobility: Ambulates or transfers with assistive devices or assistance; Unable to ambulate or transer.: No, Nursing Judgement: No  Lines:   Peripheral IV 11/12/22 Right Antecubital (Active)   Site Assessment Clean, dry & intact 11/13/22 1200   Line Status Infusing;Blood return noted; Flushed 11/13/22 1200   Line Care Connections checked and tightened 11/13/22 1200   Phlebitis Assessment No symptoms 11/13/22 1200   Infiltration Assessment 0 11/13/22 1200   Alcohol Cap Used Yes 11/13/22 1200   Dressing Status Clean, dry & intact 11/13/22 1200   Dressing Type Transparent 11/13/22 1200       Peripheral IV 11/13/22 Left Hand (Active)   Site Assessment Clean, dry & intact 11/13/22 1200   Line Status Infusing;Flushed;Blood return noted 11/13/22 [address] checked and tightened 11/13/22 1200   Phlebitis Assessment No symptoms 11/13/22 1200   Infiltration Assessment 0 11/13/22 1200   Alcohol Cap Used Yes 11/13/22 1200   Dressing Status New dressing applied 11/13/22 1200        Opportunity for questions and clarification was provided.       Patient transported with:  Monitor

## 2022-11-14 ENCOUNTER — APPOINTMENT (OUTPATIENT)
Dept: ULTRASOUND IMAGING | Age: 82
DRG: 281 | End: 2022-11-14
Attending: INTERNAL MEDICINE
Payer: MEDICARE

## 2022-11-14 ENCOUNTER — APPOINTMENT (OUTPATIENT)
Dept: NON INVASIVE DIAGNOSTICS | Age: 82
DRG: 281 | End: 2022-11-14
Attending: INTERNAL MEDICINE
Payer: MEDICARE

## 2022-11-14 ENCOUNTER — TELEPHONE (OUTPATIENT)
Dept: FAMILY MEDICINE CLINIC | Facility: CLINIC | Age: 82
End: 2022-11-14

## 2022-11-14 PROBLEM — I21.4 NSTEMI (NON-ST ELEVATED MYOCARDIAL INFARCTION) (HCC): Status: ACTIVE | Noted: 2022-11-14

## 2022-11-14 LAB
ANION GAP SERPL CALC-SCNC: 3 MMOL/L (ref 2–11)
BUN SERPL-MCNC: 14 MG/DL (ref 8–23)
CALCIUM SERPL-MCNC: 9 MG/DL (ref 8.3–10.4)
CHLORIDE SERPL-SCNC: 109 MMOL/L (ref 101–110)
CO2 SERPL-SCNC: 28 MMOL/L (ref 21–32)
CREAT SERPL-MCNC: 0.7 MG/DL (ref 0.6–1)
ECHO BSA: 1.39 M2
ECHO BSA: 1.39 M2
ECHO LV EDV A2C: 104 ML
ECHO LV EDV A4C: 80 ML
ECHO LV EDV INDEX A4C: 58 ML/M2
ECHO LV EDV NDEX A2C: 75 ML/M2
ECHO LV EJECTION FRACTION A2C: 69 %
ECHO LV EJECTION FRACTION A4C: 68 %
ECHO LV EJECTION FRACTION BIPLANE: 69 % (ref 55–100)
ECHO LV ESV A2C: 33 ML
ECHO LV ESV A4C: 26 ML
ECHO LV ESV INDEX A2C: 24 ML/M2
ECHO LV ESV INDEX A4C: 19 ML/M2
ECHO LV FRACTIONAL SHORTENING: 37 % (ref 28–44)
ECHO LV INTERNAL DIMENSION DIASTOLE INDEX: 3.09 CM/M2
ECHO LV INTERNAL DIMENSION DIASTOLIC: 4.3 CM (ref 3.9–5.3)
ECHO LV INTERNAL DIMENSION SYSTOLIC INDEX: 1.94 CM/M2
ECHO LV INTERNAL DIMENSION SYSTOLIC: 2.7 CM
ECHO LV IVSD: 0.9 CM (ref 0.6–0.9)
ECHO LV MASS 2D: 123.3 G (ref 67–162)
ECHO LV MASS INDEX 2D: 88.7 G/M2 (ref 43–95)
ECHO LV POSTERIOR WALL DIASTOLIC: 0.9 CM (ref 0.6–0.9)
ECHO LV RELATIVE WALL THICKNESS RATIO: 0.42
ECHO RV INTERNAL DIMENSION: 2.7 CM
ERYTHROCYTE [DISTWIDTH] IN BLOOD BY AUTOMATED COUNT: 16.7 % (ref 11.9–14.6)
GLUCOSE SERPL-MCNC: 79 MG/DL (ref 65–100)
HCT VFR BLD AUTO: 30.7 % (ref 35.8–46.3)
HGB BLD-MCNC: 9.9 G/DL (ref 11.7–15.4)
LV EF: 63 %
LVEF MODALITY: NORMAL
MCH RBC QN AUTO: 32.4 PG (ref 26.1–32.9)
MCHC RBC AUTO-ENTMCNC: 32.2 G/DL (ref 31.4–35)
MCV RBC AUTO: 100.3 FL (ref 82–102)
NRBC # BLD: 0 K/UL (ref 0–0.2)
PLATELET # BLD AUTO: 107 K/UL (ref 150–450)
PMV BLD AUTO: 11.3 FL (ref 9.4–12.3)
POTASSIUM SERPL-SCNC: 4 MMOL/L (ref 3.5–5.1)
RBC # BLD AUTO: 3.06 M/UL (ref 4.05–5.2)
SODIUM SERPL-SCNC: 140 MMOL/L (ref 133–143)
UFH PPP CHRO-ACNC: 0.47 IU/ML (ref 0.3–0.7)
WBC # BLD AUTO: 3.4 K/UL (ref 4.3–11.1)

## 2022-11-14 PROCEDURE — C1887 CATHETER, GUIDING: HCPCS | Performed by: INTERNAL MEDICINE

## 2022-11-14 PROCEDURE — C8924 2D TTE W OR W/O FOL W/CON,FU: HCPCS

## 2022-11-14 PROCEDURE — C1894 INTRO/SHEATH, NON-LASER: HCPCS | Performed by: INTERNAL MEDICINE

## 2022-11-14 PROCEDURE — 97530 THERAPEUTIC ACTIVITIES: CPT

## 2022-11-14 PROCEDURE — 2500000003 HC RX 250 WO HCPCS: Performed by: INTERNAL MEDICINE

## 2022-11-14 PROCEDURE — 93458 L HRT ARTERY/VENTRICLE ANGIO: CPT | Performed by: INTERNAL MEDICINE

## 2022-11-14 PROCEDURE — A4216 STERILE WATER/SALINE, 10 ML: HCPCS | Performed by: INTERNAL MEDICINE

## 2022-11-14 PROCEDURE — 76700 US EXAM ABDOM COMPLETE: CPT

## 2022-11-14 PROCEDURE — 93308 TTE F-UP OR LMTD: CPT | Performed by: INTERNAL MEDICINE

## 2022-11-14 PROCEDURE — 2580000003 HC RX 258: Performed by: HOSPITALIST

## 2022-11-14 PROCEDURE — C1769 GUIDE WIRE: HCPCS | Performed by: INTERNAL MEDICINE

## 2022-11-14 PROCEDURE — 2580000003 HC RX 258: Performed by: INTERNAL MEDICINE

## 2022-11-14 PROCEDURE — 6360000002 HC RX W HCPCS: Performed by: INTERNAL MEDICINE

## 2022-11-14 PROCEDURE — 85520 HEPARIN ASSAY: CPT

## 2022-11-14 PROCEDURE — 93931 UPPER EXTREMITY STUDY: CPT

## 2022-11-14 PROCEDURE — 97162 PT EVAL MOD COMPLEX 30 MIN: CPT

## 2022-11-14 PROCEDURE — 6360000004 HC RX CONTRAST MEDICATION: Performed by: INTERNAL MEDICINE

## 2022-11-14 PROCEDURE — 99152 MOD SED SAME PHYS/QHP 5/>YRS: CPT | Performed by: INTERNAL MEDICINE

## 2022-11-14 PROCEDURE — 36415 COLL VENOUS BLD VENIPUNCTURE: CPT

## 2022-11-14 PROCEDURE — 85027 COMPLETE CBC AUTOMATED: CPT

## 2022-11-14 PROCEDURE — 6370000000 HC RX 637 (ALT 250 FOR IP): Performed by: HOSPITALIST

## 2022-11-14 PROCEDURE — 6360000002 HC RX W HCPCS: Performed by: HOSPITALIST

## 2022-11-14 PROCEDURE — 4A023N7 MEASUREMENT OF CARDIAC SAMPLING AND PRESSURE, LEFT HEART, PERCUTANEOUS APPROACH: ICD-10-PCS | Performed by: INTERNAL MEDICINE

## 2022-11-14 PROCEDURE — 80048 BASIC METABOLIC PNL TOTAL CA: CPT

## 2022-11-14 PROCEDURE — 93567 NJX CAR CTH SPRVLV AORTGRPHY: CPT | Performed by: INTERNAL MEDICINE

## 2022-11-14 PROCEDURE — 2709999900 HC NON-CHARGEABLE SUPPLY: Performed by: INTERNAL MEDICINE

## 2022-11-14 PROCEDURE — 6370000000 HC RX 637 (ALT 250 FOR IP): Performed by: INTERNAL MEDICINE

## 2022-11-14 PROCEDURE — B2111ZZ FLUOROSCOPY OF MULTIPLE CORONARY ARTERIES USING LOW OSMOLAR CONTRAST: ICD-10-PCS | Performed by: INTERNAL MEDICINE

## 2022-11-14 PROCEDURE — 6370000000 HC RX 637 (ALT 250 FOR IP): Performed by: FAMILY MEDICINE

## 2022-11-14 PROCEDURE — 1100000003 HC PRIVATE W/ TELEMETRY

## 2022-11-14 RX ORDER — DIPHENHYDRAMINE HYDROCHLORIDE 50 MG/ML
INJECTION INTRAMUSCULAR; INTRAVENOUS PRN
Status: DISCONTINUED | OUTPATIENT
Start: 2022-11-14 | End: 2022-11-14 | Stop reason: HOSPADM

## 2022-11-14 RX ORDER — MIDAZOLAM HYDROCHLORIDE 1 MG/ML
INJECTION INTRAMUSCULAR; INTRAVENOUS PRN
Status: DISCONTINUED | OUTPATIENT
Start: 2022-11-14 | End: 2022-11-14 | Stop reason: HOSPADM

## 2022-11-14 RX ORDER — CHOLESTYRAMINE LIGHT 4 G/5.7G
4 POWDER, FOR SUSPENSION ORAL NIGHTLY
Status: DISCONTINUED | OUTPATIENT
Start: 2022-11-14 | End: 2022-11-15 | Stop reason: HOSPADM

## 2022-11-14 RX ORDER — HEPARIN SODIUM 200 [USP'U]/100ML
INJECTION, SOLUTION INTRAVENOUS CONTINUOUS PRN
Status: DISCONTINUED | OUTPATIENT
Start: 2022-11-14 | End: 2022-11-14 | Stop reason: HOSPADM

## 2022-11-14 RX ORDER — DIPHENHYDRAMINE HYDROCHLORIDE 50 MG/ML
25 INJECTION INTRAMUSCULAR; INTRAVENOUS ONCE
Status: DISCONTINUED | OUTPATIENT
Start: 2022-11-14 | End: 2022-11-14 | Stop reason: HOSPADM

## 2022-11-14 RX ORDER — LIDOCAINE HYDROCHLORIDE 10 MG/ML
INJECTION, SOLUTION INFILTRATION; PERINEURAL PRN
Status: DISCONTINUED | OUTPATIENT
Start: 2022-11-14 | End: 2022-11-14 | Stop reason: HOSPADM

## 2022-11-14 RX ADMIN — FOLIC ACID 5 MG: 1 TABLET ORAL at 08:29

## 2022-11-14 RX ADMIN — EZETIMIBE 10 MG: 10 TABLET ORAL at 08:28

## 2022-11-14 RX ADMIN — CEFTRIAXONE 1000 MG: 1 INJECTION, POWDER, FOR SOLUTION INTRAMUSCULAR; INTRAVENOUS at 17:12

## 2022-11-14 RX ADMIN — HYDRALAZINE HYDROCHLORIDE 10 MG: 20 INJECTION INTRAMUSCULAR; INTRAVENOUS at 13:42

## 2022-11-14 RX ADMIN — PANTOPRAZOLE SODIUM 40 MG: 40 TABLET, DELAYED RELEASE ORAL at 08:29

## 2022-11-14 RX ADMIN — FAMOTIDINE 20 MG: 20 TABLET, FILM COATED ORAL at 20:32

## 2022-11-14 RX ADMIN — HYDRALAZINE HYDROCHLORIDE 25 MG: 25 TABLET, FILM COATED ORAL at 08:29

## 2022-11-14 RX ADMIN — HEPARIN SODIUM 12 UNITS/KG/HR: 10000 INJECTION, SOLUTION INTRAVENOUS at 07:28

## 2022-11-14 RX ADMIN — HYDRALAZINE HYDROCHLORIDE 25 MG: 25 TABLET, FILM COATED ORAL at 20:33

## 2022-11-14 RX ADMIN — ACETAMINOPHEN 1000 MG: 500 TABLET, FILM COATED ORAL at 13:30

## 2022-11-14 RX ADMIN — ASPIRIN 81 MG 81 MG: 81 TABLET ORAL at 08:30

## 2022-11-14 RX ADMIN — OXYBUTYNIN CHLORIDE 10 MG: 10 TABLET, EXTENDED RELEASE ORAL at 08:29

## 2022-11-14 RX ADMIN — VITAMIN D, TAB 1000IU (100/BT) 2000 UNITS: 25 TAB at 17:11

## 2022-11-14 RX ADMIN — ACETAMINOPHEN 1000 MG: 500 TABLET, FILM COATED ORAL at 22:11

## 2022-11-14 RX ADMIN — CHOLESTYRAMINE 4 G: 4 POWDER, FOR SUSPENSION ORAL at 20:36

## 2022-11-14 RX ADMIN — CARVEDILOL 6.25 MG: 6.25 TABLET, FILM COATED ORAL at 17:11

## 2022-11-14 RX ADMIN — CARVEDILOL 6.25 MG: 6.25 TABLET, FILM COATED ORAL at 08:29

## 2022-11-14 RX ADMIN — SODIUM CHLORIDE, SODIUM LACTATE, POTASSIUM CHLORIDE, AND CALCIUM CHLORIDE: 600; 310; 30; 20 INJECTION, SOLUTION INTRAVENOUS at 01:15

## 2022-11-14 RX ADMIN — SODIUM CHLORIDE, SODIUM LACTATE, POTASSIUM CHLORIDE, AND CALCIUM CHLORIDE: 600; 310; 30; 20 INJECTION, SOLUTION INTRAVENOUS at 13:45

## 2022-11-14 RX ADMIN — Medication 400 MG: at 17:11

## 2022-11-14 RX ADMIN — SODIUM CHLORIDE, PRESERVATIVE FREE 10 ML: 5 INJECTION INTRAVENOUS at 22:12

## 2022-11-14 ASSESSMENT — PAIN SCALES - GENERAL
PAINLEVEL_OUTOF10: 0
PAINLEVEL_OUTOF10: 3

## 2022-11-14 ASSESSMENT — PAIN DESCRIPTION - ORIENTATION: ORIENTATION: RIGHT

## 2022-11-14 ASSESSMENT — PAIN DESCRIPTION - LOCATION: LOCATION: ARM

## 2022-11-14 ASSESSMENT — PAIN DESCRIPTION - DESCRIPTORS: DESCRIPTORS: ACHING

## 2022-11-14 NOTE — PROGRESS NOTES
TRANSFER - OUT REPORT:    Verbal report given to [facility] RN on [employer]  being transferred to [place] [id number] for routine progression of patient care       Report consisted of patient's Situation, Background, Assessment and   Recommendations(SBAR). Information from the following report(s) Nurse Handoff Report was reviewed with the receiving nurse. [place] Assessment: Presents to emergency department  because of falls (Syncope, seizure, or loss of consciousness): No, Age > 79: No, Altered Mental Status, Intoxication with alcohol or substance confusion (Disorientation, impaired judgment, poor safety awaremess, or inability to follow instructions): No, Impaired Mobility: Ambulates or transfers with assistive devices or assistance; Unable to ambulate or transer.: No, Nursing Judgement: No  Lines:   Peripheral IV 11/13/22 Left Hand (Active)   Site Assessment Clean, dry & intact 11/14/22 0833   Line Status Infusing 11/14/22 0833   Line Care Connections checked and tightened 11/14/22 0429   Phlebitis Assessment No symptoms 11/14/22 0833   Infiltration Assessment 0 11/14/22 0833   Alcohol Cap Used Yes 11/14/22 0833   Dressing Status Clean, dry & intact 11/14/22 0833   Dressing Type Transparent 11/14/22 0833       Peripheral IV 11/13/22 Right; Anterior Forearm (Active)   Site Assessment Clean, dry & intact 11/14/22 0833   Line Status Infusing 11/14/22 0833   Line Care Connections checked and tightened 11/14/22 0429   Phlebitis Assessment No symptoms 11/14/22 0833   Infiltration Assessment 0 11/14/22 0833   Alcohol Cap Used Yes 11/14/22 0833   Dressing Status Clean, dry & intact 11/14/22 0833   Dressing Type Transparent 11/14/22 1504        Opportunity for questions and clarification was provided.       Patient transported with:  tech

## 2022-11-14 NOTE — CARE COORDINATION
LOS 1 D  Patient transferred from [facility] for further evaluation and treatment NSTEMI. Patient discussed in [facility]. Patient underwent LHC this AM and off the floor during CM rounds. CM will follow patient's status to remain available to assist with new needs.

## 2022-11-14 NOTE — PROGRESS NOTES
Noticed +1 edema to patient's right forearm when she arrived back to her room from her [facility]. Fluids transferred to other arm. Monitored arm and edema worsened. Hand noted to be getting dusky so TR band removed with aprox 9ml left in the band and manual compression held instead. Joanna NP came to bedside to visualize swelling as well as Shantanu MCCRARY from cath lab. Pressure held until hemostasis achieved. Edema in arm did not worsen. Duplex ordered of arm. Edema began improving shortly after hemostasis was achieved at radial site. Sit remains stable. Pt's BP also elevated since returning to the floor. Prn hydralazine given and BP is down to 128/52. Pt resting comfortably. Will continue to monitor.

## 2022-11-14 NOTE — TELEPHONE ENCOUNTER
Call was long to make a hospital follow up and notes for the patient.   The patient was called and stated she is still in the hospital.

## 2022-11-14 NOTE — PROGRESS NOTES
Occupational Therapy Note:    Attempted to see patient this AM for occupational therapy evaluation session. Patient going COURTNEY for procedure. Will follow and re-attempt as schedule permits/patient available.  Thank you,    Katy Wilson, OT    Rehab Caseload Tracker 0

## 2022-11-14 NOTE — PROGRESS NOTES
ACUTE PHYSICAL THERAPY GOALS:   (Developed with and agreed upon by patient and/or caregiver. )  LTG:  (1.)Ms. Houser will move from supine to sit and sit to supine , scoot up and down, and roll side to side in bed with INDEPENDENT within 7 treatment day(s). (2.)Ms. Houser will transfer from bed to chair and chair to bed with SUPERVISION using the least restrictive device within 7 treatment day(s). (3.)Ms. Houser will ambulate with SUPERVISION for 200+ feet with the least restrictive device within 7 treatment day(s). ________________________________________________________________________________________________      PHYSICAL THERAPY Initial Assessment and AM  (Link to Caseload Tracking: PT Visit Days : 1  Acknowledge Orders  Time In/Out  PT Charge Capture  Rehab Caseload Tracker    Norm Nelson is a 80 y.o. female   PRIMARY DIAGNOSIS: <principal problem not specified>  NSTEMI  Procedure(s) (LRB):  LEFT HEART CATH / CORONARY ANGIOGRAPHY (N/A)     Reason for Referral: Generalized Muscle Weakness (M62.81)  Difficulty in walking, Not elsewhere classified (R26.2)  Inpatient: Payor: Rashard Moscoso / Plan: MEDICARE PART A AND B / Product Type: *No Product type* /     ASSESSMENT:     REHAB RECOMMENDATIONS:   Recommendation to date pending progress:  Setting:  Home Health Therapy    Equipment:    None     ASSESSMENT:  Ms. Kika Slaughter was supine at arrival, agreed to participate, she got to EOB with Sba, stood and walked 30ft/RW stating she has RW and rollator at home and prefers rollator due to seat. Gait pattern was in flexion due to rollator posture. Returned to room and sat in chair. Pt was in chair for 5min and Echo arrived needing pt in bed and performed trnf BTB, CGA. Pt will benefit from skilled and sophisticated PT to address and improve impairments.         [address] Main Mobility Inpatient Short Form  AM-PAC Mobility Inpatient   How much difficulty turning over in bed?: A Little  How much difficulty sitting down on / standing up from a chair with arms?: A Little  How much difficulty moving from lying on back to sitting on side of bed?: A Little  How much help from another person moving to and from a bed to a chair?: A Little  How much help from another person needed to walk in hospital room?: A Little  How much help from another person for climbing 3-5 steps with a railing?: A Little  AM-PAC Inpatient Mobility Raw Score : 18  AM-PAC Inpatient T-Scale Score : 43.63  Mobility Inpatient CMS 0-100% Score: 46.58  Mobility Inpatient CMS G-Code Modifier : CK    SUBJECTIVE:   Ms. Aristeo Nagy states, \"I went to the bathroom several times last night\"     Social/Functional Lives With: Alone  Type of Home: Condo  Home Layout: One level  Home Equipment: Rollator  Receives Help From: Home health, Personal care attendant  ADL Assistance: Needs assistance  Homemaking Assistance: Needs assistance  Ambulation Assistance: Needs assistance  Transfer Assistance: Needs assistance  Active : Yes  Mode of Transportation: Car  Pt had been receiving OT/PT HH however that only had1-2 visits left.  She has aide that comes 2x/wk, for shopping and house cleaning  OBJECTIVE:     PAIN: VITALS / O2: PRECAUTION / Melanie Talon / DRAINS:   Pre Treatment: 2         Post Treatment: 2 Vitals        Oxygen      IV    RESTRICTIONS/PRECAUTIONS:                    GROSS EVALUATION: Intact Impaired (Comments):   AROM [x]     PROM []    Strength [x]     Balance [x]     Posture [] Trunk Flexion   Sensation []     Coordination []      Tone []     Edema []    Activity Tolerance [x]      []      COGNITION/  PERCEPTION: Intact Impaired (Comments):   Orientation [x]     Vision [x]     Hearing [x]     Cognition  [x]       MOBILITY: I Mod I S SBA CGA Min Mod Max Total  NT x2 Comments:   Bed Mobility    Rolling [] [] [] [x] [] [] [] [] [] [] []    Supine to Sit [] [] [] [x] [] [] [] [] [] [] []    Scooting [] [] [] [x] [] [] [] [] [] [] [] functional Activity tolerance, Balance, Coordination, Mobility, and Strength.     TREATMENT GRID:  N/A    AFTER TREATMENT PRECAUTIONS: Bed, Bed/Chair Locked, Call light within reach, Chair, and Needs within reach    INTERDISCIPLINARY COLLABORATION:  RN/ PCT and PT/ PTA    EDUCATION: Education Given To: Patient  Education Provided: Role of Therapy  Education Method: Verbal  Barriers to Learning: None  Education Outcome: Verbalized understanding    TIME IN/OUT:  Time In: 0825  Time Out: [address]  Minutes: 350 Josiah Lance PT

## 2022-11-14 NOTE — PROGRESS NOTES
TRANSFER - OUT REPORT:    Verbal report given to RN on Naylor Rise  being transferred to [facility] for routine progression of patient care       Report consisted of patient's Situation, Background, Assessment and   Recommendations(SBAR). Information from the following report(s) Nurse Handoff Report was reviewed with the receiving nurse. [place] Assessment: Presents to emergency department  because of falls (Syncope, seizure, or loss of consciousness): No, Age > 79: No, Altered Mental Status, Intoxication with alcohol or substance confusion (Disorientation, impaired judgment, poor safety awaremess, or inability to follow instructions): No, Impaired Mobility: Ambulates or transfers with assistive devices or assistance; Unable to ambulate or transer.: No, Nursing Judgement: No  Lines:   Peripheral IV 11/13/22 Left Hand (Active)   Site Assessment Clean, dry & intact 11/14/22 0833   Line Status Infusing 11/14/22 0833   Line Care Connections checked and tightened 11/14/22 0429   Phlebitis Assessment No symptoms 11/14/22 0833   Infiltration Assessment 0 11/14/22 0833   Alcohol Cap Used Yes 11/14/22 0833   Dressing Status Clean, dry & intact 11/14/22 0833   Dressing Type Transparent 11/14/22 0833       Peripheral IV 11/13/22 Right; Anterior Forearm (Active)   Site Assessment Clean, dry & intact 11/14/22 0833   Line Status Infusing 11/14/22 0833   Line Care Connections checked and tightened 11/14/22 0429   Phlebitis Assessment No symptoms 11/14/22 0833   Infiltration Assessment 0 11/14/22 0833   Alcohol Cap Used Yes 11/14/22 0833   Dressing Status Clean, dry & intact 11/14/22 0833   Dressing Type Transparent 11/14/22 9059        Opportunity for questions and clarification was provided.       Patient transported with:  Registered Nurse    ABBEY Forrest  Diagnostic  RRA - 14    100 mg Solu-cortef  50 mg Benadryl  20 mg Pepcid  2 mg Versed  25 mcg Fentanyl  5000 units Heparin

## 2022-11-14 NOTE — PROGRESS NOTES
Hospitalist Progress Note   Admit Date:  2022  3:38 PM   Name:  Nancy Singleton   Age:  80 y.o. Sex:  female  :  1940   MRN:  [id number]   Room:  [facility]/01    Presenting Complaint: No chief complaint on file. Reason(s) for Admission: NSTEMI (non-ST elevated myocardial infarction) [facility]) [I21.4]     Hospital Course:   80-year-old female with history of RA, aortic stenosis who presented to the ER on  due to headache associated with epigastric abdominal pain. In ER, UA suggestive of UTI. CT head negative. Troponin elevated 205-247. CXR unremarkable. Abdominal US shows possible hepatic cyst; s/p cholecystectomy. Cardiology was contacted by ER physician and thought elevated troponin most likely due to poorly controlled BP. Patient was admitted to [facility] for elevated troponin/NSTEMI. EKG shows no acute ST changes. Cardiology consulted. Patient was started on ASA and heparin drip. No statin as patient has allergic history. Troponin continues to trend up 495 and cardiology recommended for patient to be transferred to [facility]  for further evaluation and management. She underwent LHC on     She was started on Rocephin empirically for UTI on admission as well. Subjective & 24hr Events (22): Saw patient after LHC. She reported right arm pain and swelling with cath site is. Otherwise, she denies headache, SOB, chest pain, abdominal pain, nausea, vomiting. Assessment & Plan:     NSTEMI   Elevated Troponin  Aortic valve regurgitation  HS-Trop on admission 205>247>495. EKG showed no acute changes on admission. TTE  with EF 60-65% with normal wall motion. S/P LHC on  showed normal coronary arteries with normal LV systolic function; supravalvular aortic stenosis involving area of the aorta above the sinotubular junction; 2-3+ aortic regurgitation.   Monitor pt on telemetry  Off heparin gtt  Cont ASA  Cardiology on board, appreciate recs  Arterial duplex of RUE pending    Acute UTI  Abx:Rocephin (11/12-. ..) empirically pending UCx  No urine culture was obtained on admission, defer for now as pt has been on IV abx    HTN  Continue hydralazine    RA  Hold home methotrexate but can resume on discharge    GERD  Cont PPI  Cont Pepcid, change to po      Anticipated discharge needs:      1-2 days pending card recs. PT/OT    I spent 39 minutes of time caring for this patient on the unit nearby or at bedside, and more than 50 percent was spent on coordination of care activities, and/or patient/family counseling regarding status and plan of care. Diet:  ADULT DIET; Regular; Low Fat/Low Chol/High Fiber/TIFFANIE; Vegan  DVT PPx: SCD\"s  Code status: Full Code    Hospital Problems:  Principal Problem:    NSTEMI (non-ST elevated myocardial infarction) ([facility] [address]  Resolved Problems:    * No resolved hospital problems. *      Objective:   Patient Vitals for the past 24 hrs:   Temp Pulse Resp BP SpO2   11/14/22 1343 -- -- -- (!) 174/70 --   11/14/22 1237 98.6 °F (37 °C) 70 16 (!) 165/77 97 %   11/14/22 1145 -- 72 -- (!) 147/75 96 %   11/14/22 1130 -- 69 -- (!) 149/55 96 %   11/14/22 1121 -- 77 -- (!) 152/61 95 %   11/14/22 1110 -- 68 -- (!) 152/61 95 %   11/14/22 0736 98.8 °F (37.1 °C) 86 16 (!) 166/67 96 %   11/14/22 0430 97.7 °F (36.5 °C) 74 16 (!) 147/98 97 %   11/13/22 2346 97.5 °F (36.4 °C) 77 16 (!) 139/59 96 %   11/13/22 2009 97.9 °F (36.6 °C) 76 16 (!) 147/55 96 %   11/13/22 1615 98.1 °F (36.7 °C) 85 16 (!) 172/68 --       Oxygen Therapy  SpO2: 97 %  Pulse via Oximetry: 72 beats per minute  O2 Device: None (Room air)    Estimated body mass index is 20.8 kg/m² as calculated from the following:    Height as of this encounter: 4' 11\" (1.499 m). Weight as of this encounter: 103 lb (46.7 kg).     Intake/Output Summary (Last 24 hours) at 11/14/2022 1441  Last data filed at 11/14/2022 1110  Gross per 24 hour   Intake 2047 ml   Output 1355 ml   Net 692 ml         Physical Exam:     Blood pressure (!) 174/70, pulse 70, temperature 98.6 °F (37 °C), temperature source Oral, resp. rate 16, height 4' 11\" (1.499 m), weight 103 lb (46.7 kg), SpO2 97 %. General:    Well nourished. Head:  Normocephalic, atraumatic  Eyes:  Sclerae appear normal.  Pupils equally round. ENT:  Nares appear normal, no drainage. Moist oral mucosa  Neck:  No restricted ROM. Trachea midline   CV:   RRR. No m/r/g. No jugular venous distension. Lungs:   CTAB. No wheezing, rhonchi, or rales. Symmetric expansion. Abdomen: Bowel sounds present. Soft, nontender, nondistended. Extremities: No cyanosis or clubbing. Ecchymosis and edema on R arm concerning for hematoma  Skin:     Warm and dry. Neuro:  CN II-XII grossly intact. Sensation intact. A&Ox3  Psych:  Normal mood and affect.       I have personally reviewed labs and tests showing:  Recent Labs:  Recent Results (from the past 48 hour(s))   Troponin    Collection Time: 11/12/22  2:59 PM   Result Value Ref Range    Troponin, High Sensitivity 247.2 (HH) 0 - 14 pg/mL   Lipase    Collection Time: 11/12/22  2:59 PM   Result Value Ref Range    Lipase 70 (L) 73 - 393 U/L   Troponin    Collection Time: 11/12/22  8:39 PM   Result Value Ref Range    Troponin, High Sensitivity 495.2 (HH) 0 - 14 pg/mL   Anti-Xa, Unfractionated Heparin    Collection Time: 11/12/22 10:10 PM   Result Value Ref Range    Anti-XA Unfrac Heparin 0.50 0.3 - 0.7 IU/mL   Troponin    Collection Time: 11/12/22 11:38 PM   Result Value Ref Range    Troponin, High Sensitivity 455.0 (HH) 0 - 14 pg/mL   Anti-Xa, Unfractionated Heparin    Collection Time: 11/13/22  4:01 AM   Result Value Ref Range    Anti-XA Unfrac Heparin 0.49 0.3 - 0.7 IU/mL   CBC with Auto Differential    Collection Time: 11/13/22  4:01 AM   Result Value Ref Range    WBC 4.0 (L) 4.3 - 11.1 K/uL    RBC 3.36 (L) 4.05 - 5.2 M/uL    Hemoglobin 10.8 (L) 11.7 - 15.4 g/dL    Hematocrit 33.2 (L) 35.8 - 46.3 %    MCV 98.8 82.0 - 102.0 FL    MCH 32.1 26.1 - 32.9 PG    MCHC 32.5 31.4 - 35.0 g/dL    RDW 17.2 (H) 11.9 - 14.6 %    Platelets 585 (L) 908 - 450 K/uL    MPV 10.6 9.4 - 12.3 FL    nRBC 0.00 0.0 - 0.2 K/uL    Differential Type AUTOMATED      Seg Neutrophils 55 43 - 78 %    Lymphocytes 29 13 - 44 %    Monocytes 14 (H) 4.0 - 12.0 %    Eosinophils % 1 0.5 - 7.8 %    Basophils 1 0.0 - 2.0 %    Immature Granulocytes 0 0.0 - 5.0 %    Segs Absolute 2.2 1.7 - 8.2 K/UL    Absolute Lymph # 1.2 0.5 - 4.6 K/UL    Absolute Mono # 0.6 0.1 - 1.3 K/UL    Absolute Eos # 0.0 0.0 - 0.8 K/UL    Basophils Absolute 0.0 0.0 - 0.2 K/UL    Absolute Immature Granulocyte 0.0 0.0 - 0.5 K/UL   Comprehensive Metabolic Panel w/ Reflex to MG    Collection Time: 11/13/22  4:01 AM   Result Value Ref Range    Sodium 141 133 - 143 mmol/L    Potassium 3.2 (L) 3.5 - 5.1 mmol/L    Chloride 106 101 - 110 mmol/L    CO2 28 21 - 32 mmol/L    Anion Gap 7 2 - 11 mmol/L    Glucose 92 65 - 100 mg/dL    BUN 17 8 - 23 MG/DL    Creatinine 0.78 0.6 - 1.0 MG/DL    Est, Glom Filt Rate >60 >60 ml/min/1.73m2    Calcium 9.0 8.3 - 10.4 MG/DL    Total Bilirubin 0.6 0.2 - 1.1 MG/DL    ALT 21 12 - 65 U/L    AST 36 15 - 37 U/L    Alk Phosphatase 74 50 - 130 U/L    Total Protein 5.9 (L) 6.3 - 8.2 g/dL    Albumin 2.7 (L) 3.2 - 4.6 g/dL    Globulin 3.2 2.8 - 4.5 g/dL    Albumin/Globulin Ratio 0.8 0.4 - 1.6     Magnesium    Collection Time: 11/13/22  4:01 AM   Result Value Ref Range    Magnesium 1.9 1.8 - 2.4 mg/dL   Anti-Xa, Unfractionated Heparin    Collection Time: 11/14/22  4:29 AM   Result Value Ref Range    Anti-XA Unfrac Heparin 0.47 0.3 - 0.7 IU/mL   CBC    Collection Time: 11/14/22  4:29 AM   Result Value Ref Range    WBC 3.4 (L) 4.3 - 11.1 K/uL    RBC 3.06 (L) 4.05 - 5.2 M/uL    Hemoglobin 9.9 (L) 11.7 - 15.4 g/dL    Hematocrit 30.7 (L) 35.8 - 46.3 %    .3 82 - 102 FL    MCH 32.4 26.1 - 32.9 PG    MCHC 32.2 31.4 - 35.0 g/dL    RDW 16.7 (H) 11.9 - 14.6 %    Platelets 736 (L) 126 - 450 K/uL    MPV 11.3 9.4 - 12.3 FL    nRBC 0.00 0.0 - 0.2 K/uL   Basic Metabolic Panel    Collection Time: 11/14/22  4:29 AM   Result Value Ref Range    Sodium 140 133 - 143 mmol/L    Potassium 4.0 3.5 - 5.1 mmol/L    Chloride 109 101 - 110 mmol/L    CO2 28 21 - 32 mmol/L    Anion Gap 3 2 - 11 mmol/L    Glucose 79 65 - 100 mg/dL    BUN 14 8 - 23 MG/DL    Creatinine 0.70 0.6 - 1.0 MG/DL    Est, Glom Filt Rate >60 >60 ml/min/1.73m2    Calcium 9.0 8.3 - 10.4 MG/DL   Transthoracic echocardiogram (TTE) limited with contrast, bubble, strain, and 3D PRN    Collection Time: 11/14/22 10:02 AM   Result Value Ref Range    LV EDV A2C 104 mL    LV EDV A4C 80 mL    LV ESV A2C 33 mL    LV ESV A4C 26 mL    IVSd 0.9 0.6 - 0.9 cm    LVIDd 4.3 3.9 - 5.3 cm    LVIDs 2.7 cm    LVPWd 0.9 0.6 - 0.9 cm    LV Ejection Fraction A2C 69 %    LV Ejection Fraction A4C 68 %    EF BP 69 55 - 100 %    RVIDd 2.7 cm    Body Surface Area 1.39 m2    Fractional Shortening 2D 37 28 - 44 %    LV ESV Index A4C 19 mL/m2    LV EDV Index A4C 58 mL/m2    LV ESV Index A2C 24 mL/m2    LV EDV Index A2C 75 mL/m2    LVIDd Index 3.09 cm/m2    LVIDs Index 1.94 cm/m2    LV RWT Ratio 0.42     LV Mass 2D 123.3 67 - 162 g    LV Mass 2D Index 88.7 43 - 95 g/m2   Cardiac procedure    Collection Time: 11/14/22 11:20 AM   Result Value Ref Range    Body Surface Area 1.39 m2       I have personally reviewed imaging studies showing: Other Studies:  US ABDOMEN COMPLETE   Final Result   There is a 2.4 x 1.9 x 1.9 cm anechoic avascular structure located in the right   lobe. This appears to represent a cyst.  The margins are slightly irregular. The significance of this is unclear. The liver is otherwise unremarkable. Status post cholecystectomy. Other findings as above.       Vascular duplex upper extremity arteries right    (Results Pending)       Current Meds:  Current Facility-Administered Medications   Medication Dose Route Frequency    famotidine (PEPCID) 20 mg in sodium chloride (PF) 0.9 % 10 mL injection  20 mg IntraVENous Once    hydrocortisone sodium succinate PF (SOLU-CORTEF) injection 100 mg  100 mg IntraVENous Once    cholestyramine light packet 4 g  4 g Oral Nightly    sodium chloride flush 0.9 % injection 5-40 mL  5-40 mL IntraVENous 2 times per day    sodium chloride flush 0.9 % injection 5-40 mL  5-40 mL IntraVENous PRN    0.9 % sodium chloride infusion   IntraVENous PRN    ondansetron (ZOFRAN-ODT) disintegrating tablet 4 mg  4 mg Oral Q8H PRN    Or    ondansetron (ZOFRAN) injection 4 mg  4 mg IntraVENous Q6H PRN    polyethylene glycol (GLYCOLAX) packet 17 g  17 g Oral Daily PRN    acetaminophen (TYLENOL) tablet 650 mg  650 mg Oral Q6H PRN    Or    acetaminophen (TYLENOL) suppository 650 mg  650 mg Rectal Q6H PRN    lactated ringers infusion   IntraVENous Continuous    heparin (porcine) injection 2,830 Units  60 Units/kg IntraVENous PRN    heparin (porcine) injection 1,420 Units  30 Units/kg IntraVENous PRN    Vitamin D (CHOLECALCIFEROL) tablet 2,000 Units  2,000 Units Oral Q MWF    acetaminophen (TYLENOL) tablet 1,000 mg  1,000 mg Oral q8h    ezetimibe (ZETIA) tablet 10 mg  10 mg Oral Daily    famotidine (PEPCID) tablet 20 mg  20 mg Oral Nightly    folic acid (FOLVITE) tablet 5 mg  5 mg Oral Daily    magnesium oxide (MAG-OX) tablet 400 mg  400 mg Oral Dinner    oxybutynin (DITROPAN-XL) extended release tablet 10 mg  10 mg Oral Daily    oxyCODONE (ROXICODONE) immediate release tablet 5 mg  5 mg Oral Daily PRN    pantoprazole (PROTONIX) tablet 40 mg  40 mg Oral Daily    hydrALAZINE (APRESOLINE) injection 10 mg  10 mg IntraVENous Q6H PRN    aspirin chewable tablet 81 mg  81 mg Oral Daily    hydrALAZINE (APRESOLINE) tablet 25 mg  25 mg Oral 2 times per day    cefTRIAXone (ROCEPHIN) 1,000 mg in sodium chloride 0.9 % 50 mL IVPB mini-bag  1,000 mg IntraVENous Q24H    carvedilol (COREG) tablet 6.25 mg  6.25 mg Oral BID MARÍA       Signed:   Henry Thomas DO    Part of this note may have been written by using a voice dictation software. The note has been proof read but may still contain some grammatical/other typographical errors.

## 2022-11-14 NOTE — PROGRESS NOTES
Called to room for radial site swelling. Nurse took TR band off and holding pressure. Arm swollen but radial pulse 2+. Bruising noted around site. [facility] and he is at bedside. Will order US right radial site.

## 2022-11-14 NOTE — PROGRESS NOTES
TRANSFER - IN REPORT:    Verbal [facility] [employer]  being received from cath lab for routine progression of patient care      Report consisted of patient's Situation, Background, Assessment and   Recommendations(SBAR). Information from the following report(s) Nurse Handoff Report was reviewed with the receiving nurse. Opportunity for questions and clarification was provided. Assessment completed upon patient's arrival to unit and care assumed.

## 2022-11-14 NOTE — CONSULTS
[facility] CARDIOLOGY History &Physical                   Subjective:     Patient is a 80 y.o. female with a hx of HTN, HLD, supravalvular aortic stenosis which was mild, mild aortic insufficiency who presented to the hospital with complaint of epigastric pain/chest pain. Pain in the epigastrium described as pressure-like severe in nature. Symptoms have been present for approximately 48 hours prior to presentation to the ER. Reports she has been taking her regular medications as directed without missing doses. Associated symptoms include headache. Denies upper chest pain, shortness of breath, cough, wheeze, dizziness or lightheadedness, or leg swelling. On arrival found to have elevated blood pressure approximately 198/73. She was started on antihypertensive therapy. Initial laboratory evaluations showed mildly elevated serum troponin which increased on serial measurements. EKG sinus rhythm with follow-up EKG demonstrating sinus tachycardia with inferolateral ST depressions which are horizontal approximately 1 mm. At this time patient is chest pain and abdominal pain-free. She has been started on heparin drip by the internal medicine service and transfer [facility] [address] [place] [address] to [facility].  No obvious aggravating factors for her presentation, symptoms improved with inpatient treatment. No other associated symptoms.       Lab Results   Component Value Date     11/13/2022    K 3.2 (L) 11/13/2022    MG 1.9 11/13/2022    BUN 17 11/13/2022    WBC 4.0 (L) 11/13/2022    HGB 10.8 (L) 11/13/2022     (L) 11/13/2022    INR 0.9 11/12/2022    TSH 2.190 05/06/2021        Past Medical History:   Diagnosis Date    Aortic regurgitation 9/9/2015    Cho done 9/1/2015     Aortic valve stenosis, mild 10/7/2015    Chest pain 1/20/2016    CHF (congestive heart failure) ([facility] [address] 2004    Chronic pain     Diverticulosis of colon (without mention of hemorrhage) 09/30/2014    Noted on colonoscopy 2010, no sx, Viktor    Essential hypertension, benign 09/30/2014    Facet arthropathy, lumbar     Family history of colonic polyps 12/28/2015    GERD (gastroesophageal reflux disease)     Hearing loss 2011    Hip fracture ([facility] [address] 08/2021    Impaired fasting glucose 09/30/2014    Lactose intolerance in adult 09/30/2014    Malaise and fatigue 1/20/2016    Mild intermittent asthma 09/30/2014    MVP (mitral valve prolapse)     Osteoarthritis     Pure hypercholesterolemia 9/30/2014    Rheumatoid arthritis involving multiple joints ([facility] [address] 09/30/2014    Dr. Oc Patton for colon cancer 12/28/2015    Senile osteoporosis 9/30/2014    Spinal stenosis of lumbar region     Urinary incontinence 09/30/2014      Past Surgical History:   Procedure Laterality Date    CATARACT REMOVAL Right     9/8/2011    CHOLECYSTECTOMY  07/08/2011    COLONOSCOPY  07/2020    . No more colonoscopies due to age. HIP FRACTURE SURGERY  08/16/2021    HYSTERECTOMY  01/28/1986    total abdominal    OTHER SURGICAL HISTORY      cauterization of a vessle in nose 2018        Family History   Problem Relation Age of Onset    High Cholesterol Mother     Hypertension Mother     Rheum Arthritis Mother     Anemia Mother     Hearing Loss Mother     Heart Disease Mother     Osteoporosis Mother     Diabetes Father     Colon Polyps Other     Parkinson's Disease Other     Hypertension Other     High Cholesterol Other     Heart Disease Other     Diabetes Other       Social History     Tobacco Use    Smoking status: Never    Smokeless tobacco: Never   Substance Use Topics    Alcohol use: No     Alcohol/week: 0.0 standard drinks      Allergies   Allergen Reactions    Amoxicillin Other (See Comments)     Causes increased methotrexate levels    Azithromycin Diarrhea    Benazepril      Unknown to pt       Celecoxib Other (See Comments)     GI upset.   As of 1/6/2021 pt states she does not have a reaction to this and takes Celebrex    Cyclobenzaprine Other (See Comments)    Ergotamine Other (See Comments)     Abdominal pain     Fluvastatin Other (See Comments)    Ibandronic Acid Other (See Comments)     Severe bone pain    Lactose Other (See Comments)    Lotrel [Amlodipine Besy-Benazepril Hcl]      Headache, rash       Other Myalgia and Other (See Comments)    Penbritin-S [Ampicillin]      diarrhea      Pravastatin Other (See Comments)    Risedronate Other (See Comments)     Severe bone pain all over    Rosuvastatin Other (See Comments)    Shellfish Allergy Diarrhea    Simvastatin Other (See Comments)     Also Lescol, Zorcor, Pravachol and Crestor  All cause muscle pain     Sulfa Antibiotics Itching and Other (See Comments)     Patient states \"tingling in hands and feet\"    Sulfisoxazole Other (See Comments)     Tingling in fingers    Tetracycline Diarrhea and Other (See Comments)     Abdominal pain    Trospium Other (See Comments)    Adhesive Tape Rash and Other (See Comments)     Surgical tape and tegaderm     Amlodipine Rash     Review of Systems   Constitutional: Negative. Negative for chills and fever. Eyes: Negative. Cardiovascular:  Positive for chest pain. Negative for dyspnea on exertion, leg swelling, near-syncope, palpitations and syncope. Respiratory: Negative. Negative for cough and shortness of breath. Endocrine: Negative. Hematologic/Lymphatic: Negative. Negative for bleeding problem. Does not bruise/bleed easily. Recent anemia. Skin: Negative. Musculoskeletal: Negative. Gastrointestinal:  Positive for abdominal pain. Negative for nausea and vomiting. Genitourinary: Negative. Neurological: Negative. Negative for dizziness and light-headedness. Psychiatric/Behavioral: Negative. Allergic/Immunologic: Negative. All other systems reviewed and are negative.       Objective:       BP (!) 147/55   Pulse 76   Temp 97.9 °F (36.6 °C)   Resp 16   Ht 4' 11\" (1.499 m)   Wt 103 lb (46.7 kg)   SpO2 96%   BMI 20.80 kg/m² 11/13 1901 - 11/14 0700  In: -   Out: 400 [Urine:400]  No intake/output data recorded. Physical Exam  Vitals reviewed. Constitutional:       General: She is not in acute distress. Appearance: She is not toxic-appearing or diaphoretic. HENT:      Head: Normocephalic and atraumatic. Right Ear: External ear normal.      Left Ear: External ear normal.      Nose: Nose normal.   Eyes:      General: No scleral icterus. Right eye: No discharge. Left eye: No discharge. Cardiovascular:      Rate and Rhythm: Normal rate and regular rhythm. Heart sounds: Murmur (2/6 systolic murmur left sternal border) heard. No friction rub. No gallop. Pulmonary:      Effort: Pulmonary effort is normal. No respiratory distress. Breath sounds: Normal breath sounds. No stridor. No wheezing or rales. Abdominal:      General: Abdomen is flat. There is no distension. Palpations: Abdomen is soft. Tenderness: There is no abdominal tenderness. Musculoskeletal:         General: No swelling. Normal range of motion. Right lower leg: No edema. Left lower leg: No edema. Skin:     General: Skin is warm and dry. Coloration: Skin is not jaundiced. Findings: No bruising. Neurological:      Mental Status: She is alert and oriented to person, place, and time. Psychiatric:         Mood and Affect: Mood normal.         Thought Content: Thought content normal.         Judgment: Judgment normal.       Data Review:   Recent Labs     11/12/22  1254 11/13/22  0401    141   K 3.5 3.2*   MG  --  1.9   BUN 17 17   WBC 5.0 4.0*   HGB 12.5 10.8*   HCT 38.5 33.2*   * 110*   INR 0.9  --        Assessment/Plan:     NSTEMI  - chest pain free at this time   - patient with elevated troponin would trend until peak  - ASA 81 mg PO daily afterwards  - patient on ACS protocol heparin IV drip, monitor blood counts given recent anemia.    - recommend nitroglycerin PRN chest pain as tolerated by BP  - obtain echocardiogram to assess for new wall motion abnormalities   - patient intolerant of multiple statin medications. - obtain repeat EKG for chest pain symptoms  - will make patient npo at midnight for consideration of possible coronary angiography tomorrow    Supravalvular aortic stenosis  - Per primary cardiologist records. Mild gradient on recent echo across the LVOT/aortic valve. Adding Coreg. No evidence of decompensated CHF on examination. HTN  - uncontrolled on arrival   - BP improved   - ACE inhibitor allergy per history  - add Coreg. HLD  - statin allergy/intolerance  - on zetia     Anemia  - recent blood loss anemia from Celebrex.  Hgb improved this admission.   - on PPI would continue   - closely monitor blood counts in AM.     Kortney [place]   11/13/2022  9:32 PM

## 2022-11-15 VITALS
HEART RATE: 69 BPM | TEMPERATURE: 98.5 F | OXYGEN SATURATION: 98 % | RESPIRATION RATE: 18 BRPM | HEIGHT: 59 IN | SYSTOLIC BLOOD PRESSURE: 158 MMHG | WEIGHT: 110.2 LBS | DIASTOLIC BLOOD PRESSURE: 64 MMHG | BODY MASS INDEX: 22.22 KG/M2

## 2022-11-15 PROBLEM — I21.4 NSTEMI (NON-ST ELEVATED MYOCARDIAL INFARCTION) (HCC): Status: RESOLVED | Noted: 2022-11-14 | Resolved: 2022-11-15

## 2022-11-15 LAB
ANION GAP SERPL CALC-SCNC: 4 MMOL/L (ref 2–11)
BUN SERPL-MCNC: 14 MG/DL (ref 8–23)
CALCIUM SERPL-MCNC: 9.3 MG/DL (ref 8.3–10.4)
CHLORIDE SERPL-SCNC: 109 MMOL/L (ref 101–110)
CO2 SERPL-SCNC: 29 MMOL/L (ref 21–32)
CREAT SERPL-MCNC: 0.7 MG/DL (ref 0.6–1)
ERYTHROCYTE [DISTWIDTH] IN BLOOD BY AUTOMATED COUNT: 17.1 % (ref 11.9–14.6)
GLUCOSE SERPL-MCNC: 106 MG/DL (ref 65–100)
HCT VFR BLD AUTO: 34.5 % (ref 35.8–46.3)
HGB BLD-MCNC: 11.3 G/DL (ref 11.7–15.4)
MCH RBC QN AUTO: 32.8 PG (ref 26.1–32.9)
MCHC RBC AUTO-ENTMCNC: 32.8 G/DL (ref 31.4–35)
MCV RBC AUTO: 100.3 FL (ref 82–102)
NRBC # BLD: 0 K/UL (ref 0–0.2)
PLATELET # BLD AUTO: 133 K/UL (ref 150–450)
PMV BLD AUTO: 11.6 FL (ref 9.4–12.3)
POTASSIUM SERPL-SCNC: 3.5 MMOL/L (ref 3.5–5.1)
RBC # BLD AUTO: 3.44 M/UL (ref 4.05–5.2)
SODIUM SERPL-SCNC: 142 MMOL/L (ref 133–143)
WBC # BLD AUTO: 7.6 K/UL (ref 4.3–11.1)

## 2022-11-15 PROCEDURE — 36415 COLL VENOUS BLD VENIPUNCTURE: CPT

## 2022-11-15 PROCEDURE — 80048 BASIC METABOLIC PNL TOTAL CA: CPT

## 2022-11-15 PROCEDURE — 6370000000 HC RX 637 (ALT 250 FOR IP): Performed by: HOSPITALIST

## 2022-11-15 PROCEDURE — 97530 THERAPEUTIC ACTIVITIES: CPT

## 2022-11-15 PROCEDURE — 97112 NEUROMUSCULAR REEDUCATION: CPT

## 2022-11-15 PROCEDURE — 99232 SBSQ HOSP IP/OBS MODERATE 35: CPT | Performed by: INTERNAL MEDICINE

## 2022-11-15 PROCEDURE — 97535 SELF CARE MNGMENT TRAINING: CPT

## 2022-11-15 PROCEDURE — 2580000003 HC RX 258: Performed by: HOSPITALIST

## 2022-11-15 PROCEDURE — 99222 1ST HOSP IP/OBS MODERATE 55: CPT | Performed by: NURSE PRACTITIONER

## 2022-11-15 PROCEDURE — 97165 OT EVAL LOW COMPLEX 30 MIN: CPT

## 2022-11-15 PROCEDURE — 6370000000 HC RX 637 (ALT 250 FOR IP): Performed by: INTERNAL MEDICINE

## 2022-11-15 PROCEDURE — 85027 COMPLETE CBC AUTOMATED: CPT

## 2022-11-15 RX ORDER — CARVEDILOL 6.25 MG/1
6.25 TABLET ORAL 2 TIMES DAILY WITH MEALS
Qty: 60 TABLET | Refills: 1 | Status: SHIPPED | OUTPATIENT
Start: 2022-11-15 | End: 2022-11-30 | Stop reason: DRUGHIGH

## 2022-11-15 RX ORDER — HYDRALAZINE HYDROCHLORIDE 50 MG/1
50 TABLET, FILM COATED ORAL EVERY 12 HOURS SCHEDULED
Status: DISCONTINUED | OUTPATIENT
Start: 2022-11-15 | End: 2022-11-15 | Stop reason: HOSPADM

## 2022-11-15 RX ORDER — ASPIRIN 81 MG/1
81 TABLET, CHEWABLE ORAL DAILY
Qty: 30 TABLET | Refills: 3 | Status: SHIPPED | OUTPATIENT
Start: 2022-11-16 | End: 2022-11-30 | Stop reason: SINTOL

## 2022-11-15 RX ORDER — HYDRALAZINE HYDROCHLORIDE 50 MG/1
50 TABLET, FILM COATED ORAL EVERY 12 HOURS SCHEDULED
Qty: 60 TABLET | Refills: 0 | Status: SHIPPED | OUTPATIENT
Start: 2022-11-15 | End: 2022-11-17

## 2022-11-15 RX ORDER — CEPHALEXIN 500 MG/1
500 CAPSULE ORAL 3 TIMES DAILY
Qty: 15 CAPSULE | Refills: 0 | Status: SHIPPED | OUTPATIENT
Start: 2022-11-15 | End: 2022-11-20

## 2022-11-15 RX ADMIN — EZETIMIBE 10 MG: 10 TABLET ORAL at 09:55

## 2022-11-15 RX ADMIN — SODIUM CHLORIDE, SODIUM LACTATE, POTASSIUM CHLORIDE, AND CALCIUM CHLORIDE: 600; 310; 30; 20 INJECTION, SOLUTION INTRAVENOUS at 04:21

## 2022-11-15 RX ADMIN — HYDRALAZINE HYDROCHLORIDE 50 MG: 50 TABLET, FILM COATED ORAL at 09:54

## 2022-11-15 RX ADMIN — FOLIC ACID 5 MG: 1 TABLET ORAL at 09:55

## 2022-11-15 RX ADMIN — CARVEDILOL 6.25 MG: 6.25 TABLET, FILM COATED ORAL at 09:54

## 2022-11-15 RX ADMIN — SODIUM CHLORIDE, PRESERVATIVE FREE 10 ML: 5 INJECTION INTRAVENOUS at 10:00

## 2022-11-15 RX ADMIN — OXYBUTYNIN CHLORIDE 10 MG: 10 TABLET, EXTENDED RELEASE ORAL at 09:54

## 2022-11-15 RX ADMIN — ACETAMINOPHEN 1000 MG: 500 TABLET, FILM COATED ORAL at 11:16

## 2022-11-15 RX ADMIN — ASPIRIN 81 MG 81 MG: 81 TABLET ORAL at 09:54

## 2022-11-15 RX ADMIN — PANTOPRAZOLE SODIUM 40 MG: 40 TABLET, DELAYED RELEASE ORAL at 09:56

## 2022-11-15 ASSESSMENT — ENCOUNTER SYMPTOMS
COUGH: 0
CHEST TIGHTNESS: 0
SHORTNESS OF BREATH: 0
GASTROINTESTINAL NEGATIVE: 1

## 2022-11-15 ASSESSMENT — PAIN SCALES - GENERAL
PAINLEVEL_OUTOF10: 0

## 2022-11-15 NOTE — PROGRESS NOTES
Progress Note  Date:11/15/2022       Room:[facility]  Patient Name:Beni Houser     YOB: 1940     Age:82 y.o. Subjective    Subjective:  Symptoms:  No shortness of breath, cough or chest pain. Blas Pearson is an 81yo female with a past medical history of Aortic Regurgitation, Aortic Stenosis, CHF, HTN, MVP, RA, Urinary incontinence, Hypercholesterolemia, and hip fracture. She presented to [facility] with a head ache and epigastric pain. Troponin was elevated 205-247. CT head was negative. Abdominal US showed a possible cyst in right hepatic lobe. UA suggested UTI and she was started on Rocephin. Pt was admitted for NSTEMI and elevated troponin. She was started on Aspirin and a heparin drip. Troponin continued to rise so she was transferred to [facility]. She underwent a Left Heart Cath on 11/14 that showed normal LV systolic function, supravalvular aortic stenosis, and 2-3+ aortic regurgitation. The procedure was uncomplicated other than some swelling and brushing in her right upper extremity, pt states this has improved since yesterday. 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 Pt was examined at bedside. She was up in the chair and had just finished working with PT. She is feeling much better today. She did report that she was starting to get a headache again. Pt denied chest pain, palpitations, SOB, weakness, dizziness.      Past Medical History:   Diagnosis Date    Aortic regurgitation 9/9/2015    Cho done 9/1/2015     Aortic valve stenosis, mild 10/7/2015    Chest pain 1/20/2016    CHF (congestive heart failure) ([facility] [address] 2004    Chronic pain     Diverticulosis of colon (without mention of hemorrhage) 09/30/2014    Noted on colonoscopy 2010, no jamil, Viktor    Essential hypertension, benign 09/30/2014    Facet arthropathy, lumbar     Family history of colonic polyps 12/28/2015    GERD (gastroesophageal reflux disease)     Hearing loss 2011    Hip fracture ([facility] 08/2021    Impaired fasting glucose 09/30/2014    Lactose intolerance in adult 09/30/2014    Malaise and fatigue 1/20/2016    Mild intermittent asthma 09/30/2014    MVP (mitral valve prolapse)     Osteoarthritis     Pure hypercholesterolemia 9/30/2014    Rheumatoid arthritis involving multiple joints ([facility] 09/30/2014    Dr. Luis Fuchs    Screening for colon cancer 12/28/2015    Senile osteoporosis 9/30/2014    Spinal stenosis of lumbar region     Urinary incontinence 09/30/2014      Current Facility-Administered Medications   Medication Dose Route Frequency Provider Last Rate Last Admin    hydrALAZINE (APRESOLINE) tablet 50 mg  50 mg Oral 2 times per day Lokesh Cam MD   50 mg at 11/15/22 0954    famotidine (PEPCID) 20 mg in sodium chloride (PF) 0.9 % 10 mL injection  20 mg IntraVENous Once Lokesh Cam MD        hydrocortisone sodium succinate PF (SOLU-CORTEF) injection 100 mg  100 mg IntraVENous Once Lokesh Cam MD        cholestyramine light packet 4 g  4 g Oral Nightly Thu Hinson, DO   4 g at 11/14/22 2036    sodium chloride flush 0.9 % injection 5-40 mL  5-40 mL IntraVENous 2 times per day Raimundo Colunga MD   10 mL at 11/15/22 1000    sodium chloride flush 0.9 % injection 5-40 mL  5-40 mL IntraVENous PRN Raimundo Colunga MD        0.9 % sodium chloride infusion   IntraVENous PRN Leocadia Spatz Ala, MD        ondansetron (ZOFRAN-ODT) disintegrating tablet 4 mg  4 mg Oral Q8H PRN Leocadia Spatz Ala, MD        Or    ondansetron (ZOFRAN) injection 4 mg  4 mg IntraVENous Q6H PRN Raimundo Colunga MD        polyethylene glycol (GLYCOLAX) packet 17 g  17 g Oral Daily PRN Leocadia Spatz Ala, MD        acetaminophen (TYLENOL) tablet 650 mg  650 mg Oral Q6H PRN Leocadia Spatz Ala, MD        Or    acetaminophen (TYLENOL) suppository 650 mg  650 mg Rectal Q6H PRN Leocadia Spatz Ala, MD        Vitamin D (CHOLECALCIFEROL) tablet 2,000 Units  2,000 Units Oral Q MWF Leocadia Spatz Ala, MD   2,000 Units at 11/14/22 1711    acetaminophen (TYLENOL) tablet 1,000 mg  1,000 mg Oral q8h Raimundo Colunga MD   1,000 mg at 11/15/22 1116    ezetimibe (ZETIA) tablet 10 mg  10 mg Oral Daily Raimundo Colunga MD   10 mg at 11/15/22 0955    famotidine (PEPCID) tablet 20 mg  20 mg Oral Nightly Raimundo Colunga MD   20 mg at 63/84/41 2875    folic acid (FOLVITE) tablet 5 mg  5 mg Oral Daily Raimundo Colunga MD   5 mg at 11/15/22 0955    magnesium oxide (MAG-OX) tablet 400 mg  400 mg Oral Dinner Raimundo Colunga MD   400 mg at 22 1711    oxybutynin (DITROPAN-XL) extended release tablet 10 mg  10 mg Oral Daily Raimundo Colunga MD   10 mg at 11/15/22 0954    oxyCODONE (ROXICODONE) immediate release tablet 5 mg  5 mg Oral Daily PRN Dene Martin Colunga MD        pantoprazole (PROTONIX) tablet 40 mg  40 mg Oral Daily Raimundo Colunga MD   40 mg at 11/15/22 0956    hydrALAZINE (APRESOLINE) injection 10 mg  10 mg IntraVENous Q6H PRN Raimundo Colunga MD   10 mg at 22 1342    aspirin chewable tablet 81 mg  81 mg Oral Daily Raimundo Colunga MD   81 mg at 11/15/22 0954    cefTRIAXone (ROCEPHIN) 1,000 mg in sodium chloride 0.9 % 50 mL IVPB mini-bag  1,000 mg IntraVENous Q24H Raimundo Colunga MD   Stopped at 22 1815    carvedilol (COREG) tablet 6.25 mg  6.25 mg Oral BID  Payton Hernandez DO   6.25 mg at 11/15/22 9009        Review of Systems   Constitutional: Negative. Respiratory:  Negative for cough, chest tightness and shortness of breath. Cardiovascular:  Negative for chest pain, palpitations and leg swelling. Gastrointestinal: Negative. Genitourinary: Negative. Neurological:  Positive for headaches. Negative for dizziness. Psychiatric/Behavioral: Negative.      Objective         Vitals Last 24 Hours:  TEMPERATURE:  Temp  Av.4 °F (36.9 °C)  Min: 98 °F (36.7 °C)  Max: 98.6 °F (37 °C)  RESPIRATIONS RANGE: Resp  Av.6  Min: 16  Max: 18  PULSE OXIMETRY RANGE: SpO2  Av.7 %  Min: 95 %  Max: 98 %  PULSE RANGE: Pulse  Av.7  Min: 69  Max: 83  BLOOD PRESSURE RANGE: Systolic (78MQM), Av , Min:128 , DYB:054   ; Diastolic (14GFH), QHI:18, Min:52, Max:77    I/O (24Hr): Intake/Output Summary (Last 24 hours) at 11/15/2022 1126  Last data filed at 11/15/2022 0420  Gross per 24 hour   Intake 240 ml   Output 1303 ml   Net -1063 ml     Objective:  General Appearance:  Well-appearing and in no acute distress. Vital signs: (most recent): Blood pressure (!) 158/64, pulse 69, temperature 98.5 °F (36.9 °C), temperature source Oral, resp. rate 18, height 4' 11\" (1.499 m), weight 110 lb 3.2 oz (50 kg), SpO2 98 %. HEENT: Normal HEENT exam.    Lungs:  Normal effort and normal respiratory rate. Breath sounds clear to auscultation. Heart: Normal rate. Regular rhythm. No murmur, gallop or friction rub. Abdomen: Abdomen is soft. There is no abdominal tenderness. Extremities: There is no local swelling. Neurological: Patient is alert and oriented to person, place and time. Skin:  Warm and dry. Labs/Imaging/Diagnostics    Labs:  CBC:  Recent Labs     22  0401 22  0429 11/15/22  0843   WBC 4.0* 3.4* 7.6   RBC 3.36* 3.06* 3.44*   HGB 10.8* 9.9* 11.3*   HCT 33.2* 30.7* 34.5*   MCV 98.8 100.3 100.3   RDW 17.2* 16.7* 17.1*   * 107* 133*     CHEMISTRIES:  Recent Labs     22  0401 22  0429 11/15/22  0843    140 142   K 3.2* 4.0 3.5    109 109   CO2 28 28 29   BUN 17 14 14   CREATININE 0.78 0.70 0.70   GLUCOSE 92 79 106*   MG 1.9  --   --      PT/INR:  Recent Labs     22  1254   PROTIME 12.4*   INR 0.9     APTT:  Recent Labs     22  1254   APTT 27.9     LIVER PROFILE:  Recent Labs     22  1254 22  0401   AST 38* 36   ALT 23 21   BILITOT 0.8 0.6   ALKPHOS 94 74       Imaging Last 24 Hours:  US ABDOMEN COMPLETE    Result Date: 2022  EXAM: US ABDOMEN COMPLETE HISTORY: ABDOMINAL PAIN. TECHNIQUE: Grayscale and limited color Doppler ultrasound of the upper abdomen. COMPARISON: None.  FINDINGS: Aorta/IVC: Visualized portions are within normal limits. Pancreas: Visualized portions are within normal limits. Liver: Liver is normal in size and echogenicity. There is a 2.4 x 1.9 x 1.9 cm anechoic avascular structure located in the right lobe. This appears to represent a cyst.  The margins are slightly irregular. Gallbladder: The patient is post cholecystectomy. Portal vein: The portal vein shows hepatopedal flow. CBD: Normal in caliber and measures 8.7 mm. Spleen: The spleen measures 7.2  cm. There are no discrete masses. Right kidney: 9.2 cm in length and without evidence of obstruction. Left kidney: 9.3 cm in length and without evidence of obstruction. The left kidney was suboptimally visualized secondary to shadowing from adjacent ribs and bowel gas. There is a 2.4 x 1.9 x 1.9 cm anechoic avascular structure located in the right lobe. This appears to represent a cyst.  The margins are slightly irregular. The significance of this is unclear. The liver is otherwise unremarkable. Status post cholecystectomy. Other findings as above. Transthoracic echocardiogram (TTE) limited with contrast, bubble, strain, and 3D PRN    Result Date: 11/14/2022    Limited study. Left Ventricle: Normal left ventricular systolic function with a visually estimated EF of 60 - 65%. Left ventricle size is normal. Normal wall thickness. Normal wall motion. Contrast used: Definity. Vascular duplex upper extremity pseudoaneurysm check right    Result Date: 11/15/2022  HISTORY: 80years-old Female with right wrist swelling after cardiac catheterization with right radial access. [facility] [place] EXAM: VAS DUP UPPER EXTREMITY PSEUDOANEURYSM CHECK RIGHT. Limited color, grayscale, and spectral Doppler ultrasound examination of the wrist vascular structures was performed to evaluate for the presence of a pseudoaneurysm. COMPARISON: None.  FINDINGS: There is limited evaluation of a hypoechoic collection/structure adjacent to the distal aspect of the right radial artery at the level the wrist. This structure measures approximate 0.6 x 1.0 x 0.5 cm in dimension. There is a neck extending from this structure to the adjacent right radial artery. This structure is consistent with a pseudoaneurysm. There is no evidence of color Doppler flow within the pseudoaneurysm consistent with thrombosis. The neck of the aneurysm measures approximately 1.2 mm with flow toward the aneurysm at a velocity of 42 cm/s. . The visualized right renal artery is patent with flow velocity of 82 cm/s. 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 Cardiac procedure    Result Date: 11/14/2022    Normal coronary arteries   Normal LV systolic function   Supravalvular aortic stenosis involving area of the aorta above the sinotubular junction. Peak to peak gradient approximately 10. Heavily calcified ring present. 2-3+ aortic regurgitation. Assessment//Plan           Active Problems:    Elevated troponin. NSTEMI, Aortic Valve Regurgitation  Troponin elevated 205-495, FILIPPO showed EF 60-65% with normal wall function. Left Heart Cath 11/14 showed normal coronary arteries, normal LV Systolic function, Supravalvular aortic stenosis, 2-3+ aortic regurgitation. Duplex US of R UE showed distal radial pseudoaneurysm. Plan: Continue Tele monitoring. Continue Aspirin. Cardiology Following. Vascular surgery consulted. Acute UTI: Urine cultures not collected on admission. Abx already started so no need for cultures.    Plan: Continue Rocephin      Essential hypertension, benign  Plan: Continue Hydralyzine      Esophageal reflux  Plan: Continue Protonix and Pepcid      Rheumatoid arthritis involving multiple joints ([address]  Plan: Hold Methotrexate until discharge      Pure hypercholesterolemia  Plan: Continue Zetia      Electronically signed by Apryl Ramirez on 11/15/22 at 11:26 AM EST

## 2022-11-15 NOTE — CONSULTS
History and Physical/Surgical Consult   Marley Mcneal    Admit date: 2022    MRN: [id number]     : 1940     Age: 80 y.o.          11/15/2022 11:57 AM    Subjective/HPI:   This patient is a 80 y.o. seen and evaluated at the request of cardiology for a small thrombosed pseudoaneurysm following LHC yesterday. She is up in the chair eating lunch. She denies any numbness or tingling to her right hand. She does report some soreness to wrist. She was admitted for abdominal and headache, found to have UTI. Troponin was elevated, cardiology was consulted for the elevated troponin/NSTEMI, She underwent LHC on  showed normal coronary arteries with normal LV systolic function; supravalvular aortic stenosis involving area of the aorta above the sinotubular junction; 2-3+ aortic regurgitation. Given her c/o wrist pain, an ultrasound was ordered. The ultrasound showed a thrombosed distal right radial artery pseudoaneurysm. PMH: aortic regurgitation, chest pain, CHF, chronic pain, diverticulosis, HTN, GERD, hearing loss, osteoarthritis, hypercholesterolemia, RA.      Review of Systems  Constitutional: negative  Respiratory: negative  Cardiovascular: negative  Musculoskeletal:positive for wrist pain  Past Medical History:   Diagnosis Date    Aortic regurgitation 2015    Cho done 2015     Aortic valve stenosis, mild 10/7/2015    Chest pain 2016    CHF (congestive heart failure) ([facility] [address]     Chronic pain     Diverticulosis of colon (without mention of hemorrhage) 2014    Noted on colonoscopy , no Viktor negron    Essential hypertension, benign 2014    Facet arthropathy, lumbar     Family history of colonic polyps 2015    GERD (gastroesophageal reflux disease)     Hearing loss     Hip fracture ([address] 2021    Impaired fasting glucose 2014    Lactose intolerance in adult 2014    Malaise and fatigue 2016    Mild intermittent asthma 2014    MVP (mitral valve prolapse)     Osteoarthritis     Pure hypercholesterolemia 9/30/2014    Rheumatoid arthritis involving multiple joints ([address] 09/30/2014    Dr. Yan Pap for colon cancer 12/28/2015    Senile osteoporosis 9/30/2014    Spinal stenosis of lumbar region     Urinary incontinence 09/30/2014      Past Surgical History:   Procedure Laterality Date    CARDIAC PROCEDURE N/A 11/14/2022    LEFT HEART CATH / CORONARY ANGIOGRAPHY performed by Silvia Ramirez MD at [facility] CARDIAC CATH LAB    CATARACT REMOVAL Right     9/8/2011    CHOLECYSTECTOMY  07/08/2011    COLONOSCOPY  07/2020    . No more colonoscopies due to age. HIP FRACTURE SURGERY  08/16/2021    HYSTERECTOMY (CERVIX STATUS UNKNOWN)  01/28/1986    total abdominal    OTHER SURGICAL HISTORY      cauterization of a vessle in nose 2018      Allergies   Allergen Reactions    Amoxicillin Other (See Comments)     Causes increased methotrexate levels    Azithromycin Diarrhea    Benazepril      Unknown to pt       Celecoxib Other (See Comments)     GI upset.   As of 1/6/2021 pt states she does not have a reaction to this and takes Celebrex    Cyclobenzaprine Other (See Comments)    Ergotamine Other (See Comments)     Abdominal pain     Fluvastatin Other (See Comments)    Ibandronic Acid Other (See Comments)     Severe bone pain    Lactose Other (See Comments)    Lotrel [Amlodipine Besy-Benazepril Hcl]      Headache, rash       Other Myalgia and Other (See Comments)    Penbritin-S [Ampicillin]      diarrhea      Pravastatin Other (See Comments)    Risedronate Other (See Comments)     Severe bone pain all over    Rosuvastatin Other (See Comments)    Shellfish Allergy Diarrhea    Simvastatin Other (See Comments)     Also Lescol, Zorcor, Pravachol and Crestor  All cause muscle pain     Sulfa Antibiotics Itching and Other (See Comments)     Patient states \"tingling in hands and feet\"    Sulfisoxazole Other (See Comments)     Tingling in fingers Tetracycline Diarrhea and Other (See Comments)     Abdominal pain    Trospium Other (See Comments)    Adhesive Tape Rash and Other (See Comments)     Surgical tape and tegaderm     Amlodipine Rash      Social History     Tobacco Use    Smoking status: Never    Smokeless tobacco: Never   Substance Use Topics    Alcohol use: No     Alcohol/week: 0.0 standard drinks      Social History     Social History Narrative    Not on file     Family History   Problem Relation Age of Onset    High Cholesterol Mother     Hypertension Mother     Rheum Arthritis Mother     Anemia Mother     Hearing Loss Mother     Heart Disease Mother     Osteoporosis Mother     Diabetes Father     Colon Polyps Other     Parkinson's Disease Other     Hypertension Other     High Cholesterol Other     Heart Disease Other     Diabetes Other         Current Facility-Administered Medications   Medication Dose Route Frequency    hydrALAZINE (APRESOLINE) tablet 50 mg  50 mg Oral 2 times per day    famotidine (PEPCID) 20 mg in sodium chloride (PF) 0.9 % 10 mL injection  20 mg IntraVENous Once    hydrocortisone sodium succinate PF (SOLU-CORTEF) injection 100 mg  100 mg IntraVENous Once    cholestyramine light packet 4 g  4 g Oral Nightly    sodium chloride flush 0.9 % injection 5-40 mL  5-40 mL IntraVENous 2 times per day    sodium chloride flush 0.9 % injection 5-40 mL  5-40 mL IntraVENous PRN    0.9 % sodium chloride infusion   IntraVENous PRN    ondansetron (ZOFRAN-ODT) disintegrating tablet 4 mg  4 mg Oral Q8H PRN    Or    ondansetron (ZOFRAN) injection 4 mg  4 mg IntraVENous Q6H PRN    polyethylene glycol (GLYCOLAX) packet 17 g  17 g Oral Daily PRN    acetaminophen (TYLENOL) tablet 650 mg  650 mg Oral Q6H PRN    Or    acetaminophen (TYLENOL) suppository 650 mg  650 mg Rectal Q6H PRN    Vitamin D (CHOLECALCIFEROL) tablet 2,000 Units  2,000 Units Oral Q MWF    acetaminophen (TYLENOL) tablet 1,000 mg  1,000 mg Oral q8h    ezetimibe (ZETIA) tablet 10 mg  10 mg Oral Daily    famotidine (PEPCID) tablet 20 mg  20 mg Oral Nightly    folic acid (FOLVITE) tablet 5 mg  5 mg Oral Daily    magnesium oxide (MAG-OX) tablet 400 mg  400 mg Oral Dinner    oxybutynin (DITROPAN-XL) extended release tablet 10 mg  10 mg Oral Daily    oxyCODONE (ROXICODONE) immediate release tablet 5 mg  5 mg Oral Daily PRN    pantoprazole (PROTONIX) tablet 40 mg  40 mg Oral Daily    hydrALAZINE (APRESOLINE) injection 10 mg  10 mg IntraVENous Q6H PRN    aspirin chewable tablet 81 mg  81 mg Oral Daily    cefTRIAXone (ROCEPHIN) 1,000 mg in sodium chloride 0.9 % 50 mL IVPB mini-bag  1,000 mg IntraVENous Q24H    carvedilol (COREG) tablet 6.25 mg  6.25 mg Oral BID WC     Objective:     Vitals:    11/15/22 0038 11/15/22 0414 11/15/22 0737 11/15/22 1056   BP: (!) 155/61 (!) 161/62 (!) 163/62 (!) 158/64   Pulse: 81 71 77 69   Resp: 16 16 18 18   Temp: 98.5 °F (36.9 °C) 98.4 °F (36.9 °C) 98.3 °F (36.8 °C) 98.5 °F (36.9 °C)   TempSrc: Oral Oral Oral Oral   SpO2: 97% 98% 96% 98%   Weight:  110 lb 3.2 oz (50 kg)     Height:         No intake/output data recorded. 11/13 1901 - 11/15 0700  In: 2287 [P.O.:420; I.V.:1867]  Out: 2658 [Urine:2653]  Physical Exam:   Gen- the patient is well developed and in no acute distress  HEENT- PERRL, EOMI, no scleral icterus       nose without alar flaring or epistaxis                  oral muscosa moist without cyanosis  Neck- no JVD or retractions  Lungs- resp even/unlab   Heart- RRR   Abd- soft  Ext- warm without cyanosis. Palpable ulnar pulse. Motor and sensory intact. Hand warm, well perfused  Skin- no jaundice or rashes  Neuro- alert and oriented x 3. No gross sensorimotor deficits are present.      Data Review   Recent Labs     11/13/22  0401 11/14/22  0429 11/15/22  0843   WBC 4.0* 3.4* 7.6   HGB 10.8* 9.9* 11.3*   HCT 33.2* 30.7* 34.5*   * 107* 133*     Recent Labs     11/12/22  1254 11/13/22  0401 11/14/22  0429 11/15/22  0843    141 140 142   K 3.5 3.2* 4.0 3.5    106 109 109   CO2 27 28 28 29   BUN 17 17 14 14   MG  --  1.9  --   --    INR 0.9  --   --   --        Assessment/Plan:      Patient is an 80year old female who underwent a LHC yesterday and developed R wrist pain, swelling and bruising. 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 Motor and sensory is intact, hand is warm and well perfused. She has a palpable ulnar pulse. Her discomfort is likely from the bruising which should subside in a few weeks. I have asked her to elevate her wrist and may apply an ice pack. No surgical intervention. Follow-up PRN. Vascular will sign off, please call with questions and concerns. 50 minutes of time was spent on this encounter with greater than 50% of time in direct patient contact including patient education, counseling, review of medical history and imaging, and medical decision making.        Curtis Toussaint Standard, APRN - CNP

## 2022-11-15 NOTE — CARE COORDINATION
Patient admitted with elevated troponin, HTN, CP. Patient underwent LHC with PCI 11/14. (R) radial cath site with hematoma formation. Vascular consulted. No intervention indicated. CM met with patient for assessment. Patient is alert and oriented X 4. Patient verified PCP and confirmed insurance. Patient lives alone in a 1 level condo. Patient hired an assistant , Renzo Contreras her hip surgery 2 yrs ago instead of going to a STR . Liza Dickey works 2 days/wk. Joanna visits on Thurs for grocery shopping and Saturday she cleans the house. Patient uses a rollator and has a 3 wheeled rollator for Drs visits. DME: Rollator (2 & 3 wheels), shower chair. Patient is able to afford medications. Patient drives herself to appointments. Patient is current with [facility]. After discussion with patient and her friend, Ingrid Maddox, both agree to [facility] with [facility] and in addition: RN, , OT, aide. Ingrid Varsha requests additional tele monitoring through [facility]. Order placed. Referral sent. Joanna to transport patient home at 1600.        11/15/22 1350   Service Assessment   Patient Orientation Alert and Oriented   Cognition Alert   History Provided By Patient   Primary [facility]  Friends/Neighbors;Home Care Staff  (Deny De Anda and home aide)   [facility] is: Legal Next of Carol Ann Clinton   PCP Verified by CM Yes  Noel Cruz)   Last Visit to PCP Within last 6 months   Prior Functional Level Assistance with the following:;Shopping;Housework  (Personal aide 2 X/wk. Grocery shops and cleans the house.)   Current Functional Level Assistance with the following:;Shopping;Housework   Can patient return to prior living arrangement Yes   Ability to make needs known: Good   Family able to assist with home care needs: Yes   Would you like for me to discuss the discharge plan with any other family members/significant others, and if so, who?  Yes  Malachi Tavares)   Financial Resources [employer] ECF/Home Care   Social/Functional History   Lives With Alone   Type of [address] One level   Bathroom Shower/Tub Shower chair with back   [address] Help From Home health;Personal care attendant   ADL Assistance Needs assistance   [address] Needs assistance   Homemaking Responsibilities No   Ambulation Assistance Needs assistance   Transfer Assistance Needs assistance   Active  Yes   Mode of Transportation Car   Discharge Planning   Type of [facility] Prior To Admission Home Care  ([facility] home [facility] PT)   [address]  (Additional disciplines with home health.)   DME Ordered? No   Potential Assistance Purchasing Medications No   Type of Home Care Services PT   Patient expects to be discharged to: [facility] Discharge   Transition of Care Consult (CM Consult) [facility] No   Services At/After Discharge Home Health;PT;OT;Nursing services   [facility] Information Provided? No   Mode of Transport at Discharge   Dick Devine, personal aide transport by car.)   Confirm Follow Up Transport Friends   Condition of Participation: Discharge Planning   The Plan for Transition of Care is related to the following treatment goals: Home with home health [facility] [place])   The Patient and/or Patient Representative was provided with a Choice of Provider? Patient   The Patient and/Or Patient Representative agree with the Discharge Plan? Yes   Freedom of Choice list was provided with basic dialogue that supports the patient's individualized plan of care/goals, treatment preferences, and shares the quality data associated with the providers?   Yes

## 2022-11-15 NOTE — DISCHARGE SUMMARY
Hospitalist Discharge Summary   Admit Date:  2022  3:38 PM   DC Note date: 11/15/2022  Name:  [employer]   Age:  80 y.o. Sex:  female  :  1940   MRN:  [id number]   Room:  [facility]  PCP:  Nelida Dunn MD    Presenting Complaint: No chief complaint on file. Initial Admission Diagnosis: NSTEMI (non-ST elevated myocardial infarction) ([address] [I21.4]     Problem List for this Hospitalization (present on admission):    Principal Problem (Resolved):    NSTEMI (non-ST elevated myocardial infarction) ([address]  Active Problems:    Elevated troponin    Nonrheumatic aortic valve disorder    Essential hypertension, benign    Esophageal reflux    Rheumatoid arthritis involving multiple joints ([address]    Pure hypercholesterolemia      Hospital Course:  27-year-old female with history of RA, aortic stenosis who presented to the ER on  due to headache associated with epigastric abdominal pain. In ER, UA suggestive of UTI. CT head negative. Troponin elevated 205-247. CXR unremarkable. Abdominal US shows possible hepatic cyst; s/p cholecystectomy. Cardiology was contacted by ER physician and thought elevated troponin most likely due to poorly controlled BP. Patient was [facility] to [facility] for elevated troponin/NSTEMI. EKG shows no acute ST changes. Cardiology consulted. Patient was started on ASA and heparin drip. No statin as patient has allergic history. Troponin continues to trend up 495 and cardiology recommended for patient to be transferred to [facility]  for further evaluation and management. She underwent LHC on  showed normal coronary arteries with normal LV systolic function; supravalvular aortic stenosis involving area of the aorta above the sinotubular junction; 2-3+ aortic regurgitation. She had R arm pain and edema after LHC.  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 Vascular Surgery consulted and recommended no surgical intervention as patient has no deficits in her motor and sensory functions with patient's hand warm and well-perfused. Recommended that her discomfort is likely from bruising which would subside in a few weeks; elevate her wrist and may apply ice pack as needed. She was started on Rocephin empirically for UTI on admission as well. No urine culture was obtained on admission but pt clinically improved. She will be discharged on Keflex to finish 5d course of antibiotic. Patient's antihypertensives regimen was adjusted. She continues to feel well and desires to go home. Discussed with cardiology and patient was okay to be discharged from their standpoint. She will need to follow-up with her PCP in 3-5 days for repeat CBC and BMP. She will need to follow-up with cardiology in 1 week as scheduled. Return precautions given to patient and she verbalized understanding. Called and updated her friend as well prior to discharge as requested. Disposition: Home health therapy  Diet: ADULT DIET; Regular; Low Fat/Low Chol/High Fiber/TIFFANIE; Vegan  Code Status: Full Code    Follow Ups:   Follow-up Information     RIC DE LA CRUZ MD Follow up. Specialty: Cardiology  Why: office will call patient later today or tomorrow with appt in 1-2   weeks  Contact information:  [address]  [address] [phone]                       Time spent in patient discharge and coordination 45 minutes. Follow up labs/diagnostics (ultimately defer to outpatient provider): She will need to follow-up with her PCP in 3-5 days for repeat CBC and BMP. She will need to follow-up with cardiology in 1 week as scheduled. Plan was discussed with patient . All questions answered. Patient was stable at time of discharge. Instructions given to call a physician or return if any concerns.     Current Discharge Medication List        START taking these medications    Details   aspirin 81 MG chewable tablet Take 1 tablet by mouth daily  Qty: 30 tablet, Refills: 3      carvedilol (COREG) 6.25 MG tablet Take 1 tablet by mouth 2 times daily (with meals)  Qty: 60 tablet, Refills: 1      cephALEXin (KEFLEX) 500 MG capsule Take 1 capsule by mouth 3 times daily for 5 days  Qty: 15 capsule, Refills: 0      hydrALAZINE (APRESOLINE) 50 MG tablet Take 1 tablet by mouth every 12 hours  Qty: 60 tablet, Refills: 0           CONTINUE these medications which have NOT CHANGED    Details   methotrexate (RHEUMATREX) 2.5 MG chemo tablet 7.5 mg once a week Friday      oxyCODONE (ROXICODONE) 5 MG immediate release tablet TAKE 1/2 TO 1 (ONE-HALF TO ONE) TABLET BY MOUTH ONCE DAILY AS NEEDED FOR SEVERE EPISODE PAIN      pantoprazole (PROTONIX) 40 MG tablet TAKE 1 TABLET BY MOUTH EVERY DAY  Qty: 90 tablet, Refills: 3      ezetimibe (ZETIA) 10 MG tablet Take 1 tablet by mouth in the morning. TAKE 1 TABLET BY MOUTH EVERY DAY.   Qty: 30 tablet, Refills: 5      calcium citrate-vitamin d 250-200 MG-UNIT TABS Take by mouth daily      colestipol (COLESTID) 1 g tablet Take 1 tablet by mouth daily TAKE 1 TABLET BY MOUTH EVERY DAY  Qty: 60 tablet, Refills: 3      !! UNABLE TO FIND 2 times daily Fish oil /Omega 3 vegan liquid gel caps.      magnesium (MAGNESIUM-OXIDE) 250 MG TABS tablet Take 250 mg by mouth Daily with supper      !! UNABLE TO FIND daily Apple Pectin 500 mg take with niacin      !! UNABLE TO FIND in the morning and at bedtime Ginko Biloba 60 mg one tablet BID      !! UNABLE TO FIND Take 2 oz by mouth every morning (before breakfast)      !! UNABLE TO FIND Rolator walker light      Menaquinone-7 (VITAMIN K2 PO) Take by mouth      ACETAMINOPHEN ER PO Take 1,000 mg by mouth 3 times daily (with meals)      ascorbic acid (VITAMIN C) 500 MG tablet Take 500 mg by mouth 2 times daily      Cholecalciferol 50 MCG (2000 UT) TABS Take 6,000 Units by mouth daily      coenzyme Q10 100 MG CAPS capsule Take 100 mg by mouth daily      famotidine (PEPCID) 40 MG tablet TAKE 1 TABLET BY MOUTH EVERY DAY AT NIGHT      folic acid (FOLVITE) 1 MG tablet Takes 5 tabs qd      furosemide (LASIX) 20 MG tablet Take 20 mg by mouth daily as needed TAKE 1 TABLET BY MOUTH EVERY DAY prn leg swelling      niacin (NIASPAN) 500 MG extended release tablet Take 500 mg by mouth daily      oxybutynin (DITROPAN-XL) 10 MG extended release tablet 10 mg daily       ! ! - Potential duplicate medications found. Please discuss with provider. STOP taking these medications       estradiol (ESTRACE) 0.1 MG/GM vaginal cream Comments:   Reason for Stopping:         Multiple Vitamins-Minerals (CENTRUM SILVER 50+WOMEN PO) Comments:   Reason for Stopping:               Procedures done this admission:  Procedure(s):  LEFT HEART CATH / CORONARY ANGIOGRAPHY    Consults this admission:  IP CONSULT TO CARDIOLOGY  IP CONSULT TO VASCULAR SURGERY    Echocardiogram results:  11/13/22    TRANSTHORACIC ECHOCARDIOGRAM (TTE) LIMITED (CONTRAST/BUBBLE/3D PRN) 11/14/2022  2:05 PM (Final)    Interpretation Summary    Limited study. Left Ventricle: Normal left ventricular systolic function with a visually estimated EF of 60 - 65%. Left ventricle size is normal. Normal wall thickness. Normal wall motion. Contrast used: Definity. Signed by: Adalberto Smiley MD on 11/14/2022  2:05 PM      Diagnostic Imaging/Tests:   XR HIP LEFT (2-3 VIEWS)    Result Date: 11/2/2022  1. No evidence of acute fracture or dislocation. 2. There is no evidence of hardware complication or failure. 3. Possible healed fracture of the right superior pubic ramus. CT HEAD WO CONTRAST    Result Date: 11/12/2022  1. No acute intracranial abnormality. 2.  Rounded focal area of hypoattenuation in the right centrum semiovale may be a small old infarct. 3.  Dense opacification of the right maxillary sinus likely representing chronic sinusitis.     US ABDOMEN COMPLETE    Result Date: 11/14/2022  There is a 2.4 x 1.9 x 1.9 cm anechoic avascular structure located in the right lobe. This appears to represent a cyst.  The margins are slightly irregular. The significance of this is unclear. The liver is otherwise unremarkable. Status post cholecystectomy. Other findings as above. XR CHEST PORTABLE    Result Date: 11/12/2022  No acute abnormality. Vascular duplex upper extremity pseudoaneurysm check right    Result Date: 11/15/2022  Findings consistent with a small thrombosed distal right radial artery pseudoaneurysm at the level of the wrist, as above.        Labs: Results:       BMP, Mg, Phos Recent Labs     11/13/22  0401 11/14/22  0429 11/15/22  0843    140 142   K 3.2* 4.0 3.5    109 109   CO2 28 28 29   ANIONGAP 7 3 4   BUN 17 14 14   CREATININE 0.78 0.70 0.70   LABGLOM >60 >60 >60   CALCIUM 9.0 9.0 9.3   GLUCOSE 92 79 106*   MG 1.9  --   --       CBC Recent Labs     11/13/22  0401 11/14/22  0429 11/15/22  0843   WBC 4.0* 3.4* 7.6   RBC 3.36* 3.06* 3.44*   HGB 10.8* 9.9* 11.3*   HCT 33.2* 30.7* 34.5*   MCV 98.8 100.3 100.3   MCH 32.1 32.4 32.8   MCHC 32.5 32.2 32.8   RDW 17.2* 16.7* 17.1*   * 107* 133*   MPV 10.6 11.3 11.6   NRBC 0.00 0.00 0.00   SEGS 55  --   --    LYMPHOPCT 29  --   --    EOSRELPCT 1  --   --    MONOPCT 14*  --   --    BASOPCT 1  --   --    IMMGRAN 0  --   --    SEGSABS 2.2  --   --    LYMPHSABS 1.2  --   --    EOSABS 0.0  --   --    MONOSABS 0.6  --   --    BASOSABS 0.0  --   --    ABSIMMGRAN 0.0  --   --       LFT Recent Labs     11/13/22 0401   BILITOT 0.6   ALKPHOS 74   AST 36   ALT 21   PROT 5.9*   LABALBU 2.7*   GLOB 3.2      Cardiac  Lab Results   Component Value Date/Time    TROPHS 455.0 11/12/2022 11:38 PM    TROPHS 495.2 11/12/2022 08:39 PM    TROPHS 247.2 11/12/2022 02:59 PM      Coags Lab Results   Component Value Date/Time    PROTIME 12.4 11/12/2022 12:54 PM    INR 0.9 11/12/2022 12:54 PM    APTT 27.9 11/12/2022 12:54 PM      A1c Lab Results   Component Value Date/Time    LABA1C 5.0 11/01/2022 01:59 PM    EAG 97 11/01/2022 01:59 PM      Lipids Lab Results   Component Value Date/Time    CHOL 194 11/01/2022 01:59 PM    LDLCALC 83.4 11/01/2022 01:59 PM    LABVLDL 17.6 11/01/2022 01:59 PM    HDL 93 11/01/2022 01:59 PM    CHOLHDLRATIO 2.1 11/01/2022 01:59 PM    TRIG 88 11/01/2022 01:59 PM      Thyroid  Lab Results   Component Value Date/Time    FHL5VIN 1.810 11/01/2022 01:59 PM        Most Recent UA Lab Results   Component Value Date/Time    COLORU DARK YELLOW 11/12/2022 01:32 PM    APPEARANCE CLOUDY 11/12/2022 01:32 PM    SPECGRAV 1.025 11/12/2022 01:32 PM    LABPH 5.5 11/12/2022 01:32 PM    PROTEINU TRACE 11/12/2022 01:32 PM    GLUCOSEU Negative 11/12/2022 01:32 PM    KETUA 15 11/12/2022 01:32 PM    BILIRUBINUR Negative 11/12/2022 01:32 PM    BLOODU Negative 11/12/2022 01:32 PM    UROBILINOGEN 0.2 11/12/2022 01:32 PM    NITRU Positive 11/12/2022 01:32 PM    LEUKOCYTESUR MODERATE 11/12/2022 01:32 PM    WBCUA 20-50 11/12/2022 01:32 PM    BACTERIA 4+ 11/12/2022 01:32 PM        No results for input(s): CULTURE in the last 720 hours.     All Labs from Last 24 Hrs:  Recent Results (from the past 24 hour(s))   CBC    Collection Time: 11/15/22  8:43 AM   Result Value Ref Range    WBC 7.6 4.3 - 11.1 K/uL    RBC 3.44 (L) 4.05 - 5.2 M/uL    Hemoglobin 11.3 (L) 11.7 - 15.4 g/dL    Hematocrit 34.5 (L) 35.8 - 46.3 %    .3 82 - 102 FL    MCH 32.8 26.1 - 32.9 PG    MCHC 32.8 31.4 - 35.0 g/dL    RDW 17.1 (H) 11.9 - 14.6 %    Platelets 891 (L) 039 - 450 K/uL    MPV 11.6 9.4 - 12.3 FL    nRBC 0.00 0.0 - 0.2 K/uL   Basic Metabolic Panel    Collection Time: 11/15/22  8:43 AM   Result Value Ref Range    Sodium 142 133 - 143 mmol/L    Potassium 3.5 3.5 - 5.1 mmol/L    Chloride 109 101 - 110 mmol/L    CO2 29 21 - 32 mmol/L    Anion Gap 4 2 - 11 mmol/L    Glucose 106 (H) 65 - 100 mg/dL    BUN 14 8 - 23 MG/DL    Creatinine 0.70 0.6 - 1.0 MG/DL    Est, Glom Filt Rate >60 >60 ml/min/1.73m2    Calcium 9.3 8.3 - 10.4 MG/DL       Allergies   Allergen Reactions    Amoxicillin Other (See Comments)     Causes increased methotrexate levels    Azithromycin Diarrhea    Benazepril      Unknown to pt       Celecoxib Other (See Comments)     GI upset.   As of 1/6/2021 pt states she does not have a reaction to this and takes Celebrex    Cyclobenzaprine Other (See Comments)    Ergotamine Other (See Comments)     Abdominal pain     Fluvastatin Other (See Comments)    Ibandronic Acid Other (See Comments)     Severe bone pain    Lactose Other (See Comments)    Lotrel [Amlodipine Besy-Benazepril Hcl]      Headache, rash       Other Myalgia and Other (See Comments)    Penbritin-S [Ampicillin]      diarrhea      Pravastatin Other (See Comments)    Risedronate Other (See Comments)     Severe bone pain all over    Rosuvastatin Other (See Comments)    Shellfish Allergy Diarrhea    Simvastatin Other (See Comments)     Also Lescol, Zorcor, Pravachol and Crestor  All cause muscle pain     Sulfa Antibiotics Itching and Other (See Comments)     Patient states \"tingling in hands and feet\"    Sulfisoxazole Other (See Comments)     Tingling in fingers    Tetracycline Diarrhea and Other (See Comments)     Abdominal pain    Trospium Other (See Comments)    Adhesive Tape Rash and Other (See Comments)     Surgical tape and tegaderm     Amlodipine Rash     Immunization History   Administered Date(s) Administered    COVID-19, PFIZER PURPLE top, DILUTE for use, (age 15 y+), 30mcg/0.3mL 01/19/2021, 02/13/2021, 10/11/2021, 04/04/2022    Influenza Trivalent 09/01/2015, 09/29/2015    Influenza Virus Vaccine 09/24/2013, 10/05/2017, 08/22/2020    Influenza, FLUZONE (age 72 y+), High Dose, 0.7mL 08/22/2020    Influenza, High Dose (Fluzone 65 yrs and older) 10/04/2016, 10/05/2017, 10/23/2018, 08/22/2020, 09/06/2021    Influenza, Triv, inactivated, subunit, adjuvanted, IM (Fluad 65 yrs and older) 08/27/2019    Influenza, Trivalent PF 10/01/2014    Pneumococcal Conjugate 13-valent ([id number]) 10/04/2016    Pneumococcal Polysaccharide ([id number]) 01/01/2005, 10/05/2017       Recent Vital Data:  Patient Vitals for the past 24 hrs:   Temp Pulse Resp BP SpO2   11/15/22 1056 98.5 °F (36.9 °C) 69 18 (!) 158/64 98 %   11/15/22 0737 98.3 °F (36.8 °C) 77 18 (!) 163/62 96 %   11/15/22 0414 98.4 °F (36.9 °C) 71 16 (!) 161/62 98 %   11/15/22 0038 98.5 °F (36.9 °C) 81 16 (!) 155/61 97 %   11/14/22 2033 -- -- -- (!) 138/56 --   11/14/22 2014 98.5 °F (36.9 °C) 80 16 (!) 143/62 95 %   11/14/22 1720 98 °F (36.7 °C) 83 16 (!) 165/64 97 %   11/14/22 1520 -- -- -- (!) 128/52 --   11/14/22 1343 -- -- -- (!) 174/70 --       Oxygen Therapy  SpO2: 98 %  Pulse via Oximetry: 72 beats per minute  O2 Device: None (Room air)    Estimated body mass index is 22.26 kg/m² as calculated from the following:    Height as of this encounter: 4' 11\" (1.499 m). Weight as of this encounter: 110 lb 3.2 oz (50 kg). Intake/Output Summary (Last 24 hours) at 11/15/2022 1304  Last data filed at 11/15/2022 0420  Gross per 24 hour   Intake 240 ml   Output 1003 ml   Net -763 ml         Physical Exam:    General:          Well nourished. Head:               Normocephalic, atraumatic  Eyes:               Sclerae appear normal.  Pupils equally round. ENT:                Nares appear normal, no drainage. Moist oral mucosa  Neck:               No restricted ROM. Trachea midline   CV:                  RRR. No m/r/g. No jugular venous distension. Lungs:             CTAB. No wheezing, rhonchi, or rales. Symmetric expansion. Abdomen: Bowel sounds present. Soft, nontender, nondistended. Extremities:     No cyanosis or clubbing. Ecchymosis and edema on R arm. Pulse and sensation intact. No restriction ROM at wrist for fingers on RUE. Skin:                Warm and dry. Neuro:             CN II-XII grossly intact. Sensation intact. A&Ox3  Psych:             Normal mood and affect. Signed:   Cristo Clements, DO    Part of this note may have been written by using a voice dictation software. The note has been proof read but may still contain some grammatical/other typographical errors.

## 2022-11-15 NOTE — DISCHARGE INSTRUCTIONS
Left Heart Catheterization: About This Test  What is it? Left heart catheterization is a test to check the left side of your heart. Your doctor might check the structure of your heart, the motion of your heart, or the blood pressure inside the chambers. Why is this test done? This test gives information about how your heart is working. It can:  Check blood flow and blood pressure in the chambers of the heart. Check the pumping action of the heart. Find out if a heart defect is present and how severe it is. Find out how well the heart valves work. How is the test done? You may get medicine to help you relax. You will get a shot to numb the skin where the catheter goes in. A thin tube called a catheter is put into a blood vessel in your groin or wrist. The doctor moves the catheter through the blood vessel into your heart. Dye may be injected into your heart. Your doctor can watch on special monitors as the dye moves in your heart. The dye helps your doctor see blood flow in your heart. If a heart defect is found, cardiac catheterization sometimes is used to repair it during the test.  After the procedure, pressure may be applied for a short time to the area where the catheter was put into your blood vessel. This will help prevent bleeding. A small device may also be used to close the blood vessel. You may have a bandage or compression device on the catheter site. You will stay in a room for at least a few hours to make sure the catheter site starts to heal.  If the catheter was placed in your groin, you may lie in bed for up to a few hours. If the catheter was put in your wrist, you will need to keep your arm still for at least 1 hour. How long does it take? The test may take about 1 hour. But you need time to get ready for the test and time to recover. This can take a few hours. What happens after the test?  You may or may not need to stay in the hospital overnight.  You will get more instructions for what to do at home. Drink plenty of fluids for several hours after the test. If you have kidney, heart, or liver disease and have to limit fluids, talk with your doctor before you increase the amount of fluids you drink. Follow-up care is a key part of your treatment and safety. Be sure to make and go to all appointments, and call your doctor if you are having problems. It's also a good idea to know your test results and keep a list of the medicines you take. Where can you learn more? Go to [email or web address] org and sign in to your [employer] account. Enter W306 in the [employer] box to learn more about \"Left Heart Catheterization: About This Test.\"     If you do not have an account, please click on the \"Sign Up Now\" link. Current as of: January 10, 2022               Content Version: 13.4  © 2006-2022 [employer]. Care instructions adapted under license by [facility] ([facility]). If you have questions about a medical condition or this instruction, always ask your healthcare professional. [place] [address] any warranty or liability for your use of this information. Pseudo-Aneurysm: Care Instructions  Your Care Instructions  A pseudo-aneurysm is a hole in the wall of a blood vessel (artery). The blood from inside the artery can then leak out of the artery. It is most common in the artery that runs from the hip to the knee. It can be caused by the puncture of an artery during a medical test, such as certain heart tests. People who take blood thinners are more likely to develop a pseudo-aneurysm. Some pseudo-aneurysms heal without treatment. Those that continue to grow larger may need treatment. The doctor may inject medicine through a needle to make the blood clot in the pseudo-aneurysm. He or she may also use pressure (compression) to make the blood clot.  If these do not work, you may need surgery to repair the hole in the artery. Follow-up care is a key part of your treatment and safety. Be sure to make and go to all appointments, and call your doctor if you are having problems. It's also a good idea to know your test results and keep a list of the medicines you take. How can you care for yourself at home? Be safe with medicines. Take your medicines exactly as prescribed. Call your doctor if you think you are having a problem with your medicine. Ask your doctor what type and level of activity is safe for you. Manage blood pressure. A healthy lifestyle along with medicines may help you lower your blood pressure. Manage cholesterol to help keep your blood vessels healthy. A healthy lifestyle along with medicines may help you manage cholesterol. Do not smoke. If you need help quitting, talk to your doctor about stop-smoking programs and medicines. These can increase your chances of quitting for good. Eat heart-healthy foods. These include fruits, vegetables, whole grains, fish, and low-fat or nonfat dairy foods. Limit sodium, alcohol, and sweets. Stay at a healthy weight. Lose weight if you need to. Manage other health problems. If you think you may have a problem with alcohol or drug use, talk to your doctor. When should you call for help? Call 911 anytime you think you may need emergency care. For example, call if:    You have sudden chest pain and shortness of breath, or you cough up blood. You passed out (lost consciousness). Call your doctor now or seek immediate medical care if:    You have sudden or increasing pain in your groin. Your leg or foot is cool or pale or changes color. You have tingling or numbness in your foot. Watch closely for changes in your health, and be sure to contact your doctor if:    You do not get better as expected. Current as of: March 28, 2022               Content Version: 13.4  © 4763-8450 [employer].    Care instructions adapted under license by [facility]. If you have questions about a medical condition or this instruction, always ask your healthcare professional. [address] any warranty or liability for your use of this information. hydralazine  Pronunciation:  jennifer diaz  Brand:  Apresoline  What is the most important information I should know about hydralazine? You should not use this medicine if you have coronary artery disease, or rheumatic heart disease affecting the mitral valve. What is hydralazine? Hydralazine is a vasodilator that works by relaxing the muscles in your blood vessels to help them dilate (widen). This lowers blood pressure and allows blood to flow more easily through your veins and arteries. Hydralazine is used to treat high blood pressure (hypertension). Hydralazine may also be used for purposes not listed in this medication guide. What should I discuss with my healthcare provider before taking hydralazine? You should not use hydralazine if you are allergic to it, or if you have:  coronary artery disease; or  rheumatic heart disease affecting the mitral valve. To make sure hydralazine is safe for you, tell your doctor if you have ever had:  kidney disease;  systemic lupus erythematosus;  angina (chest pain); or  a stroke. It is not known whether this medicine will harm an unborn baby. Tell your doctor if you are pregnant or plan to become pregnant. Hydralazine can pass into breast milk, but effects on the nursing baby are not known. Tell your doctor if you are breast-feeding. Hydralazine is not approved for use by anyone younger than 25years old. How should I take hydralazine? Follow all directions on your prescription label. Do not take this medicine in larger or smaller amounts or for longer than recommended. Your blood pressure will need to be checked often. You may also need frequent blood tests. Keep using this medicine as directed, even if you feel well.  High blood pressure often has no symptoms. You may need to use blood pressure medicine for the rest of your life. Store at room temperature away from moisture and heat. What happens if I miss a dose? Take the missed dose as soon as you remember. Skip the missed dose if it is almost time for your next scheduled dose. Do not take extra medicine to make up the missed dose. What happens if I overdose? Seek emergency medical attention or call the Poison Help line at 1-569.882.7673. Overdose symptoms may include rapid heartbeats, warmth or tingling under your skin, chest pain, or fainting. What should I avoid while taking hydralazine? Avoid getting up too fast from a sitting or lying position, or you may feel dizzy. Get up slowly and steady yourself to prevent a fall. What are the possible side effects of hydralazine? Get emergency medical help if you have signs of an allergic reaction:  hives; difficult breathing; swelling of your face, lips, tongue, or throat. Call your doctor at once if you have:  chest pain or pressure, pain spreading to your jaw or shoulder;  fast or pounding heartbeats;  a light-headed feeling, like you might pass out;  numbness, tingling, or burning pain in your hands or feet;  painful or difficult urination;  little or no urination; or  lupus-like syndrome --joint pain or swelling with fever, swollen glands, muscle aches, chest pain, vomiting, unusual thoughts or behavior, and patchy skin color. Common side effects may include:  chest pain, fast heart rate;  headache; or  nausea, vomiting, diarrhea, loss of appetite. This is not a complete list of side effects and others may occur. Call your doctor for medical advice about side effects. You may report side effects to FDA at 8-404-FDA-0578. What other drugs will affect hydralazine?   Tell your doctor about all your current medicines and any you start or stop using, especially:  diazoxide (an injectable blood pressure medication); or  an MAO inhibitor --isocarboxazid, linezolid, methylene blue injection, phenelzine, rasagiline, selegiline, tranylcypromine, and others. This list is not complete. Other drugs may interact with hydralazine, including prescription and over-the-counter medicines, vitamins, and herbal products. Not all possible interactions are listed in this medication guide. Where can I get more information? Your pharmacist can provide more information about hydralazine. Remember, keep this and all other medicines out of the reach of children, never share your medicines with others, and use this medication only for the indication prescribed. Every effort has been made to ensure that the information provided by [address] [place], [place] [address]can [address] is accurate, up-to-date, and complete, but no guarantee is made to that effect. Drug information contained herein may be time sensitive. [facility] information has been compiled for use by healthcare practitioners and consumers in the [facility] and therefore [facility] does not warrant that uses outside of the [facility] are appropriate, unless specifically indicated otherwise. [facility]'s drug information does not endorse drugs, diagnose patients or recommend therapy. [facility] drug information is an informational resource designed to assist licensed healthcare practitioners in caring for their patients and/or to serve consumers viewing this service as a supplement to, and not a substitute for, the expertise, skill, knowledge and judgment of healthcare practitioners. The absence of a warning for a given drug or drug combination in no way should be construed to indicate that the drug or drug combination is safe, effective or appropriate for any given patient. [facility] does not assume any responsibility for any aspect of healthcare administered with the aid of information [facility] provides.  The information contained herein is not intended to cover all possible uses, directions, precautions, warnings, drug interactions, allergic reactions, or adverse effects. If you have questions about the drugs you are taking, check with your doctor, nurse or pharmacist.  Copyright 1473-1072 [address]. Version: 5.01. Revision date: 1/10/2018. Care instructions adapted under license by [facility] ([facility]). If you have questions about a medical condition or this instruction, always ask your healthcare professional. [place] [address] any warranty or liability for your use of this information. carvedilol  Pronunciation:  SARIAH ve dil ole  Brand:  Coreg, Coreg CR  What is the most important information I should know about carvedilol? You should not take carvedilol if you have asthma, bronchitis, emphysema, severe liver disease, or a serious heart condition such as heart block, \"sick sinus syndrome,\" or slow heart rate (unless you have a pacemaker). What is carvedilol? Carvedilol is a beta-blocker that is used to treat heart failure and hypertension (high blood pressure). Carvedilol is also used after a heart attack that has caused your heart not to pump as well. Carvedilol may also be used for purposes not listed in this medication guide. What should I discuss with my healthcare provider before taking carvedilol? You should not take carvedilol if you are allergic to it, or if you have:  asthma, bronchitis, emphysema;  severe liver disease; or  a serious heart condition such as severe heart failure, heart block, \"sick sinus syndrome,\" or slow heart rate (unless you have a pacemaker).   Tell your doctor if you have ever had:  coronary artery disease (clogged arteries);  slow heartbeats that have caused you to faint;  fluid retention;  asthma or other lung problems;  angina (chest pain);  diabetes (taking carvedilol can make it harder for you to tell when you have low blood sugar);  a thyroid disorder;  kidney disease;  circulation problems (such as Raynaud's syndrome); or  pheochromocytoma (tumor of the adrenal gland). Tell your doctor if you are pregnant or breastfeeding. Carvedilol is not approved for use by anyone younger than 25years old. How should I take carvedilol? Follow all directions on your prescription label and read all medication guides or instruction sheets. Your doctor may occasionally change your dose. Use the medicine exactly as directed. Carvedilol works best if you take it with food, at the same time every day. Swallow the extended-release capsule whole and do not crush, chew, break, or open it. If you cannot swallow a capsule whole, open it and sprinkle the medicine into a spoonful of cold applesauce. Swallow the mixture right away without chewing. Do not save it for later use. If you are switched from carvedilol tablets to carvedilol extended-release capsules (Coreg CR), your daily total dose of this medicine may be higher or lower than before. Older adults may be more likely to become dizzy or feel faint when switching from tablets to extended-release capsules. Follow your doctor's instructions. Your blood pressure will need to be checked often. If you need surgery (including cataract surgery), tell your surgeon you currently use this medicine. You may need to stop for a short time. You should not stop using carvedilol suddenly. Stopping suddenly may cause chest pain or a heart attack. Follow your doctor's instructions about tapering your dose. If you are being treated for high blood pressure, keep using this medication even if you feel well. High blood pressure often has no symptoms. You may need to use blood pressure medication for the rest of your life. Carvedilol is only part of a complete treatment program that may also include diet, exercise, and weight control. Follow your doctor's instructions very closely. Store at room temperature away from moisture and heat. What happens if I miss a dose?   Take the medicine as soon as you can, but skip the missed dose if it is almost time for your next dose. Do not take two doses at one time. What happens if I overdose? Seek emergency medical attention or call the Poison Help line at 1-628.501.9113. Overdose symptoms may include uneven heartbeats, shortness of breath, bluish-colored fingernails, dizziness, weakness, fainting, and seizure (convulsions). What should I avoid while taking carvedilol? Avoid driving or hazardous activity until you know how this medicine will affect you. Your reactions could be impaired. Avoid getting up too fast from a sitting or lying position, or you may feel dizzy. What are the possible side effects of carvedilol? Get emergency medical help if you have signs of an allergic reaction: hives; difficulty breathing; swelling of your face, lips, tongue, or throat. Call your doctor at once if you have:  a light-headed feeling, like you might pass out;  slow or uneven heartbeats;  cold feeling or numbness in your fingers or toes;  chest pain, dry cough, wheezing, chest tightness;  heart problems --swelling, rapid weight gain, feeling short of breath; or  high blood sugar --increased thirst, increased urination, dry mouth, fruity breath odor. Common side effects may include:  dizziness;  slow heartbeats;  diarrhea;  weight gain;  dry eyes; or  problems wearing contact lenses. This is not a complete list of side effects and others may occur. Call your doctor for medical advice about side effects. You may report side effects to FDA at 2-610-HGR-2797. What other drugs will affect carvedilol? Sometimes it is not safe to use certain medications at the same time. Some drugs can affect your blood levels of other drugs you take, which may increase side effects or make the medications less effective. Other drugs may affect carvedilol, including prescription and over-the-counter medicines, vitamins, and herbal products. Tell your doctor about all your current medicines and any medicine you start or stop using.   Where can I get more information? Your pharmacist can provide more information about carvedilol. Remember, keep this and all other medicines out of the reach of children, never share your medicines with others, and use this medication only for the indication prescribed. Every effort has been made to ensure that the information provided by [address] is accurate, up-to-date, and complete, but no guarantee is made to that effect. Drug information contained herein may be time sensitive. [facility] information has been compiled for use by healthcare practitioners and consumers in the [place] and therefore [facility] does not warrant that uses outside of the [place] are appropriate, unless specifically indicated otherwise. [facility]'s drug information does not endorse drugs, diagnose patients or recommend therapy. [facility] drug information is an informational resource designed to assist licensed healthcare practitioners in caring for their patients and/or to serve consumers viewing this service as a supplement to, and not a substitute for, the expertise, skill, knowledge and judgment of healthcare practitioners. The absence of a warning for a given drug or drug combination in no way should be construed to indicate that the drug or drug combination is safe, effective or appropriate for any given patient. [facility] does not assume any responsibility for any aspect of healthcare administered with the aid of information [facility] provides. The information contained herein is not intended to cover all possible uses, directions, precautions, warnings, drug interactions, allergic reactions, or adverse effects. If you have questions about the drugs you are taking, check with your doctor, nurse or pharmacist.  Copyright 1654-1211 [address]. Version: 16.01. Revision date: 4/25/2019. Care instructions adapted under license by [facility] ([facility]).  If you have questions about a medical condition or this instruction, always ask your healthcare professional. [address] any warranty or liability for your use of this information. cephalexin  Pronunciation:  sef a TING in  Brand:  Keflex  What is the most important information I should know about cephalexin? You should not use this medicine if you are allergic to cephalexin or to similar antibiotics, such as Ceftin, Cefzil, Omnicef, and others. Tell your doctor if you are allergic to any drugs, especially penicillins or other antibiotics. What is cephalexin? Cephalexin is a cephalosporin (SEF a low spor in) antibiotic that is used to treat bacterial infections of the lungs, ear, skin, bones, bladder, and kidneys. Cephalexin is used to treat infections in adults and children who are at least 3year old. Cephalexin may also be used for purposes not listed in this medication guide. What should I discuss with my healthcare provider before taking cephalexin? You should not use this medicine if you are allergic to cephalexin or any other cephalosporin antibiotic (cefdinir, cefadroxil, cefoxitin, cefprozil, ceftriaxone, cefuroxime, Omnicef, and others). Tell your doctor if you have ever had:  an allergy to any drug (especially penicillin);  liver or kidney disease; or  intestinal problems, such as colitis. The liquid form of cephalexin may contain sugar. This may affect you if you have diabetes. Tell your doctor if you are pregnant or breast-feeding. How should I take cephalexin? Follow all directions on your prescription label and read all medication guides or instruction sheets. Use the medicine exactly as directed. Do not use cephalexin to treat any condition that has not been checked by your doctor. Measure liquid medicine carefully. Use the dosing syringe provided, or use a medicine dose-measuring device (not a kitchen spoon). Use this medicine for the full prescribed length of time, even if your symptoms quickly improve.  Skipping doses can increase your risk of infection that is resistant to medication. Cephalexin will not treat a viral infection such as the flu or a common cold. Do not share cephalexin with another person, even if they have the same symptoms you have. This medicine can affect the results of certain medical tests. Tell any doctor who treats you that you are using cephalexin. Store the tablets and capsules at room temperature away from moisture, heat, and light. Store the liquid medicine in the refrigerator. Throw away any unused liquid after 14 days. What happens if I miss a dose? Take the medicine as soon as you can, but skip the missed dose if it is almost time for your next dose. Do not take two doses at one time. What happens if I overdose? Seek emergency medical attention or call the Poison Help line at 1-738.524.3673. Overdose symptoms may include nausea, vomiting, stomach pain, diarrhea, and blood in your urine. What should I avoid while taking cephalexin? Antibiotic medicines can cause diarrhea, which may be a sign of a new infection. If you have diarrhea that is watery or bloody, call your doctor before using anti-diarrhea medicine. What are the possible side effects of cephalexin? Get emergency medical help if you have signs of an allergic reaction (hives, difficult breathing, swelling in your face or throat) or a severe skin reaction (fever, sore throat, burning eyes, skin pain, red or purple skin rash with blistering and peeling). Call your doctor at once if you have:  severe stomach pain, diarrhea that is watery or bloody (even if it occurs months after your last dose);  unusual tiredness, feeling light-headed or short of breath;  easy bruising, unusual bleeding, purple or red spots under your skin;  a seizure;  pale skin, cold hands and feet;  yellowed skin, dark colored urine;  fever, weakness; or  pain in your side or lower back, painful urination.   Common side effects may include:  diarrhea;  nausea, vomiting;  indigestion, stomach pain; or  vaginal itching or discharge. This is not a complete list of side effects and others may occur. Call your doctor for medical advice about side effects. You may report side effects to FDA at 9-612-LGW-8917. What other drugs will affect cephalexin? Tell your doctor about all your other medicines, especially:  metformin; or  probenecid. This list is not complete. Other drugs may affect cephalexin, including prescription and over-the-counter medicines, vitamins, and herbal products. Not all possible drug interactions are listed here. Where can I get more information? Your pharmacist can provide more information about cephalexin. Remember, keep this and all other medicines out of the reach of children, never share your medicines with others, and use this medication only for the indication prescribed. Every effort has been made to ensure that the information provided by [address] is accurate, up-to-date, and complete, but no guarantee is made to that effect. Drug information contained herein may be time sensitive. [employer] information has been compiled for use by healthcare practitioners and consumers in the [place] and therefore [employer] does not warrant that uses outside of the [place] are appropriate, unless specifically indicated otherwise. [employer] drug information does not endorse drugs, diagnose patients or recommend therapy. [employer] drug information is an informational resource designed to assist licensed healthcare practitioners in caring for their patients and/or to serve consumers viewing this service as a supplement to, and not a substitute for, the expertise, skill, knowledge and judgment of healthcare practitioners. The absence of a warning for a given drug or drug combination in no way should be construed to indicate that the drug or drug combination is safe, effective or appropriate for any given patient.  [employer] does not assume any responsibility for any aspect of healthcare administered with the aid of information [facility] provides. The information contained herein is not intended to cover all possible uses, directions, precautions, warnings, drug interactions, allergic reactions, or adverse effects. If you have questions about the drugs you are taking, check with your doctor, nurse or pharmacist.  Copyright 4246-3875 Lo [address] [place] [address] Avenue: 10.03. Revision date: 1/4/2021. Care instructions adapted under license by [facility] ([facility]). If you have questions about a medical condition or this instruction, always ask your healthcare professional. [place] [address] any warranty or liability for your use of this information. aspirin (oral)  Pronunciation:  AS pir in  Brand:  Arthritis Pain, Aspi-Cor, Aspir-Low, Aretha Plus, Durlaza, Ecotrin, Miniprin, Vazalore  What is the most important information I should know about aspirin? Aspirin can cause Reye's syndrome, a serious and sometimes fatal condition in children. What is aspirin? Aspirin is a salicylate (ns-ZVF-[id number]-ate) that is used to treat pain, and reduce fever or inflammation. Aspirin is sometimes used to treat or prevent heart attacks, strokes, and chest pain (angina). Aspirin should be used for these conditions only under the supervision of a doctor. Aspirin may also be used for purposes not listed in this medication guide. What should I discuss with my healthcare provider before taking aspirin? Using aspirin in a child or teenager with flu symptoms or chickenpox can cause a serious or fatal condition called Reye's syndrome. You should not use aspirin if you are allergic to it, or if you have:  a recent history of stomach or intestinal bleeding;  a bleeding disorder such as hemophilia; or  if you have ever had an asthma attack or severe allergic reaction after taking aspirin or an NSAID (non-steroidal anti-inflammatory drug).   Tell your doctor if you have ever had:  asthma or seasonal allergies;  stomach ulcers;  liver disease;  kidney disease;  a bleeding or blood clotting disorder;  gout; or  heart disease, high blood pressure, or congestive heart failure. Taking aspirin during late pregnancy may cause bleeding in the mother or the baby during delivery. Tell your doctor if you are pregnant or plan to become pregnant. You should not breastfeed while using this medicine. How should I take aspirin? Use exactly as directed on the label, or as prescribed by your doctor. Always follow directions on the medicine label about giving aspirin to a child. Take with food if aspirin upsets your stomach. You must chew the chewable tablet before you swallow it. Do not crush, chew, break, or open an enteric-coated  or delayed/extended-release pill. Swallow it whole. Tell your doctor if you have a planned surgery. Store at room temperature away from moisture and heat. Do not use aspirin if you smell a strong vinegar odor in the aspirin bottle. The medicine may no longer be effective. What happens if I miss a dose? Aspirin is used when needed. If you are on a dosing schedule, skip any missed dose. Do not use two doses at one time. What happens if I overdose? Seek emergency medical attention or call the Poison Help line at 1-226.708.2575. Overdose may cause stomach pain, vomiting, diarrhea, vision or hearing problems, fast or slow breathing, or confusion. What should I avoid while taking aspirin? Avoid alcohol. Heavy drinking can increase your risk of stomach bleeding. Avoid taking ibuprofen if you take aspirin to prevent stroke or heart attack. Ibuprofen can make aspirin less effective in protecting your heart and blood vessels. Ask your doctor how far apart your doses should be. Ask a doctor or pharmacist before using other medicines for pain, fever, swelling, or cold/flu symptoms.  They may contain ingredients similar to aspirin (such as magnesium salicylate, ibuprofen, ketoprofen, or naproxen). What are the possible side effects of aspirin? Get emergency medical help if you have signs of an allergic reaction: hives; difficult breathing; swelling of your face, lips, tongue, or throat. Stop using aspirin and call your doctor at once if you have:  ringing in your ears, confusion, hallucinations, rapid breathing, seizure (convulsions);  severe nausea, vomiting, or stomach pain;  bloody or tarry stools, coughing up blood or vomit that looks like coffee grounds;  fever lasting longer than 3 days; or  swelling, or pain lasting longer than 10 days. Common side effects may include:  upset stomach, heartburn;  drowsiness; or  mild headache. This is not a complete list of side effects and others may occur. Call your doctor for medical advice about side effects. You may report side effects to FDA at 5-799-FDA-4696. What other drugs will affect aspirin? Ask your doctor before using aspirin if you take an antidepressant. Taking certain antidepressants with aspirin may cause you to bruise or bleed easily. Ask a doctor or pharmacist before using aspirin with any other medications, especially:  a blood thinner (warfarin, Coumadin, Ellin Daryn), or other medication used to prevent blood clots; or  other salicylates such as Nuprin Backache Caplet, Kaopectate, KneeRelief, Pamprin Cramp Formula, Pepto-Bismol, Tricosal, Trilisate, and others. This list is not complete. Other drugs may affect aspirin, including prescription and over-the-counter medicines, vitamins, and herbal products. Not all possible drug interactions are listed here. Where can I get more information? Your pharmacist can provide more information about aspirin. Remember, keep this and all other medicines out of the reach of children, never share your medicines with others, and use this medication only for the indication prescribed.    Every effort has been made to ensure that the information provided by [address] is accurate, up-to-date, and complete, but no guarantee is made to that effect. Drug information contained herein may be time sensitive. [facility] information has been compiled for use by healthcare practitioners and consumers in the [place] and therefore [facility] does not warrant that uses outside of the [place] are appropriate, unless specifically indicated otherwise. [facility]'s drug information does not endorse drugs, diagnose patients or recommend therapy. [facility]'s drug information is an informational resource designed to assist licensed healthcare practitioners in caring for their patients and/or to serve consumers viewing this service as a supplement to, and not a substitute for, the expertise, skill, knowledge and judgment of healthcare practitioners. The absence of a warning for a given drug or drug combination in no way should be construed to indicate that the drug or drug combination is safe, effective or appropriate for any given patient. [facility] does not assume any responsibility for any aspect of healthcare administered with the aid of information [facility] provides. The information contained herein is not intended to cover all possible uses, directions, precautions, warnings, drug interactions, allergic reactions, or adverse effects. If you have questions about the drugs you are taking, check with your doctor, nurse or pharmacist.  Copyright 2868-5723 [facility] [place]. Version: 16.03. Revision date: 6/28/2021. Care instructions adapted under license by [facility] ([facility]). If you have questions about a medical condition or this instruction, always ask your healthcare professional. [place] [address] any warranty or liability for your use of this information.

## 2022-11-15 NOTE — PROGRESS NOTES
ACUTE PHYSICAL THERAPY GOALS:   (Developed with and agreed upon by patient and/or caregiver. )  LTG:  (1.)Ms. Houser will move from supine to sit and sit to supine , scoot up and down, and roll side to side in bed with INDEPENDENT within 7 treatment day(s). (2.)Ms. Houser will transfer from bed to chair and chair to bed with SUPERVISION using the least restrictive device within 7 treatment day(s). (3.)Ms. Houser will ambulate with SUPERVISION for 200+ feet with the least restrictive device within 7 treatment day(s). PHYSICAL THERAPY: Daily Note AM   (Link to Caseload Tracking: PT Visit Days : 2  Time In/Out PT Charge Capture  Rehab Caseload Tracker  Orders    Daryle Poncho is a 80 y.o. female   PRIMARY DIAGNOSIS: NSTEMI (non-ST elevated myocardial infarction) ([facility] [address]  NSTEMI (non-ST elevated myocardial infarction) ([facility] [address] [I21.4]  Procedure(s) (LRB):  LEFT HEART CATH / CORONARY ANGIOGRAPHY (N/A)  1 Day Post-Op  Inpatient: Payor: MEDICARE / Plan: MEDICARE PART A AND B / Product Type: *No Product type* /     ASSESSMENT:     REHAB RECOMMENDATIONS:   Recommendation to date pending progress:  Setting:  Home Health Therapy    Equipment:    To Be Determined     ASSESSMENT:  Ms. Peri Chavez was seated EOB working alongside OT upon entering the room and agreeable to hallway ambulation with PT. Today, pt demonstrates progress toward established goals. This session, pt cont to require CG/ SB (A) for all functional mobility although she shows improved activity tolerance today. Pt able to stand at sink under guidance of OT for extended time to complete ADLs before initiating hallway mobility. Once completed c ADLs, pt able to utilize RW to ambulate 50'x1 into hallway. Her gait cont to show dec vy, inc sway and shuffling nature although she ultimately did well to avoid any LOB/ miss-steps. Furthermore, pt performed all activity on RA and denied any dizziness, lightheadedness or SOB.  At this time, pt remains a good candidate for skilled PT and will continue to benefit from advancing POC. At end of session, pt was positioned to comfort in chair with all needs in reach. RN was made aware of pt performance.         SUBJECTIVE:   Ms. Uma Deal states, \"I was glad to have you two today\"     Social/Functional Lives With: Alone  Type of Home: Condo  Home Layout: One level  Home Equipment: Lana Mowers, rolling, Rollator  Receives Help From: Home health, Personal care attendant  ADL Assistance: Needs assistance  Homemaking Assistance: Needs assistance  Ambulation Assistance: Needs assistance  Transfer Assistance: Needs assistance  Active : Yes  Mode of Transportation: Car  OBJECTIVE:     PAIN: Noris Salen / O2: Estefania Sherman / Elo Alberts / Angela Purple:   Pre Treatment: 8/10 LLE pain         Post Treatment: 8/10 LLE pain Vitals        Oxygen   RA IV, Purewick, and Telemetry     RESTRICTIONS/PRECAUTIONS:        MOBILITY: I Mod I S SBA CGA Min Mod Max Total  NT x2 Comments:   Bed Mobility    Rolling [] [] [] [] [] [] [] [] [] [x] [] EOB on arrival; in chair on departure    Supine to Sit [] [] [] [] [] [] [] [] [] [x] []    Scooting [] [] [] [] [] [] [] [] [] [x] []    Sit to Supine [] [] [] [] [] [] [] [] [] [x] []    Transfers    Sit to Stand [] [] [] [x] [] [] [] [] [] [] []    Bed to Chair [] [] [] [x] [] [] [] [] [] [] []    Stand to Sit [] [] [] [x] [] [] [] [] [] [] []     [] [] [] [] [] [] [] [] [] [] []    I=Independent, Mod I=Modified Independent, S=Supervision, SBA=Standby Assistance, CGA=Contact Guard Assistance,   Min=Minimal Assistance, Mod=Moderate Assistance, Max=Maximal Assistance, Total=Total Assistance, NT=Not Tested    BALANCE: Good Fair+ Fair Fair- Poor NT Comments   Sitting Static [x] [] [] [] [] []    Sitting Dynamic [] [x] [] [] [] []              Standing Static [] [x] [] [] [] []    Standing Dynamic [] [x] [] [] [] []      GAIT: I Mod I S SBA CGA Min Mod Max Total  NT x2 Comments:   Level of Assistance [] [] [] [] [x] [] [] [] [] [] []    Distance   50'x1    DME Gait Belt and Rolling Walker    Gait Quality Decreased vy , Decreased step clearance, Decreased step length, Shuffling , and Trunk sway increased    Weightbearing Status      Stairs      I=Independent, Mod I=Modified Independent, S=Supervision, SBA=Standby Assistance, CGA=Contact Guard Assistance,   Min=Minimal Assistance, Mod=Moderate Assistance, Max=Maximal Assistance, Total=Total Assistance, NT=Not Tested    PLAN:   FREQUENCY AND DURATION: 3 times/week for duration of hospital stay or until stated goals are met, whichever comes first.    TREATMENT:   TREATMENT:   Therapeutic Activity (23 Minutes): Therapeutic activity included Scooting, Transfer Training, Ambulation on level ground, Sitting balance , and Standing balance to improve functional Activity tolerance, Balance, Mobility, and Strength.     TREATMENT GRID:  N/A    AFTER TREATMENT PRECAUTIONS: Bed/Chair Locked, Call light within reach, Chair, Needs within reach, and RN notified    INTERDISCIPLINARY COLLABORATION:  RN/ PCT, PT/ PTA, and OT/ [place]    EDUCATION:      TIME IN/OUT:  Time In: Tiana Villarreal 1950  Time Out: Cinthya Osuna 93  Minutes: Aide Fink [phone], PT

## 2022-11-15 NOTE — ACP (ADVANCE CARE PLANNING)
Advance Care Planning     General Advance Care Planning (ACP) Conversation    Date of Conversation: 11/13/2022  Conducted with: Patient with Decision Making Capacity    Healthcare Decision Maker:    Primary Decision Maker: Alexandra Orr - [phone]  Click here to complete Healthcare Decision Makers including selection of the Healthcare Decision Maker Relationship (ie \"Primary\"). Today we documented Decision Maker(s) consistent with ACP documents on file. Content/Action Overview:   Has ACP document(s) on file - reflects the patient's care preferences  Reviewed DNR/DNI and patient elects Full Code (Attempt Resuscitation)        Length of Voluntary ACP Conversation in minutes:  <16 minutes (Non-Billable)    Franchesca Burden RN

## 2022-11-15 NOTE — PROGRESS NOTES
ACUTE OCCUPATIONAL THERAPY GOALS:   (Developed with and agreed upon by patient and/or caregiver.)  1. Patient will complete lower body bathing and dressing with Mod I and adaptive equipment as   needed. 2. Patient will completed upper body bathing and dressing with Mod I and adaptive equipment as needed. 3. Patient will complete grooming standing at sink with Mod I and adaptive equipment as needed. 4. Patient will complete toileting with Mod I and adaptive equipment as needed. 5. Patient will tolerate 30 minutes of OT treatment with no rest breaks to increase activity tolerance for ADLs. 6. Patient will complete functional transfers with Mod I and adaptive equipment as needed. Timeframe: 7 visits       OCCUPATIONAL THERAPY Initial Assessment, Daily Note, and AM       OT Visit Days: 1  Acknowledge Orders  Time  OT Charge Capture  Rehab Caseload Tracker      Brooks White is a 80 y.o. female   PRIMARY DIAGNOSIS: NSTEMI (non-ST elevated myocardial infarction) ([facility] [address]  NSTEMI (non-ST elevated myocardial infarction) ([facility] [address] [I21.4]  Procedure(s) (LRB):  LEFT HEART CATH / CORONARY ANGIOGRAPHY (N/A)  1 Day Post-Op  Reason for Referral: Generalized Muscle Weakness (M62.81)  Other abnormalities of gait and mobility (R26.89)  Inpatient: Payor: MEDICARE / Plan: MEDICARE PART A AND B / Product Type: *No Product type* /     ASSESSMENT:     REHAB RECOMMENDATIONS:   Recommendation to date pending progress:  Setting:  Home Health Therapy    Equipment:    To Be Determined     ASSESSMENT:  Ms. Uma Deal is a 81 y/o F presenting s/p NSTEMI. Pt supine on entry on RA, A/O x 4. Applicable PMHx: RA, aortic stenosis. Pt denied light headedness, dizziness or SOB. Pt reports no fall(s) in past 6 months. PLOF Mod I, living alone. DME present in home; [employer]WR[employer], SC, BSC. Pt currently CGA-SBA with UB ADLs, CGA with LB ADLs, CGA for toileting and CGA for functional t/fs and household mobility for ADLs with RW.  Pt presents with edema in R forearm and NSG/pt reported clot. Per NSG pt to have minimal use of RUE, pt adhering to RUE precaution. Rest breaks utilized as needed throughout session. Pt presents with deficits in ADLs, functional t/fs, household mobility for ADLs and activity tolerance compared to reported PLOF. Pt tolerated session well. Pt presents pleasant and motivated with good rehab potential. Pt would benefit from continued skilled OT services for duration of hospital stay and after t/f to next level of care or until all stated goals met.       [address] [facility] [address] [id number] [facility] [facility] Clicks Daily Activity Inpatient Short Form:    AM-PAC Daily Activity Inpatient   How much help for putting on and taking off regular lower body clothing?: A Little  How much help for Bathing?: A Little  How much help for Toileting?: A Little  How much help for putting on and taking off regular upper body clothing?: None  How much help for taking care of personal grooming?: A Little  How much help for eating meals?: None  [facility] Inpatient Daily Activity Raw Score: 20  AM-PAC Inpatient ADL T-Scale Score : 42.03  ADL Inpatient CMS 0-100% Score: 38.32  ADL Inpatient CMS G-Code Modifier : CJ           SUBJECTIVE:     Ms. Betty Jimenez states, \"It feels like I'm holding something but I know I'm not\"     Social/Functional Lives With: Alone  Type of Home: Condo  Home Layout: One level  Home Equipment: Eleonore Fail, rolling, Rollator  Receives Help From: Home health, Personal care attendant  ADL Assistance: Needs assistance  Homemaking Assistance: Needs assistance  Ambulation Assistance: Needs assistance  Transfer Assistance: Needs assistance  Active : Yes  Mode of Transportation: Car    OBJECTIVE:     Lorenzo Redding / Ale Donaldson / Serge Credit: Galilea Garza, and Telemetry     RESTRICTIONS/PRECAUTIONS:       PAIN: VITALS / O2:   Pre Treatment:   Pain Assessment: 0-10  Pain Level:  (Pt rpeorts \"sciatic\" pain in BLE intermittently during session, no impact on session, NSG notified)  Non-Pharmaceutical Pain Intervention(s): Repositioned;Rest;Ambulation/Increased Activity;Nurse notified (comment)  Response to Pain Intervention: Patient satisfied      Post Treatment: None reported       Vitals          Oxygen            GROSS EVALUATION: INTACT IMPAIRED   (See Comments)   UE AROM [x] []   UE PROM [] []N/T   Strength [] LUE Strength  Gross LUE Strength: WFL  L Hand General: 3+/5  LUE Strength Comment: 3+/5  RUE Strength  R Hand General: 3/5  RUE limited testing d/t minimal use precaution     Posture / Balance [] Sitting - Static: Good  Sitting - Dynamic: Good  Standing - Static: Fair, +  Standing - Dynamic: Fair, +   Sensation []  Decreased/altered sensation noted in B/L finger tips,    Coordination []  Generally decreased secondary to decreased sensation and RA in B/L fingers     Tone []  N/A     Edema [] Edema noted in R forearm   Activity Tolerance [] Patient limited by fatigue     Hand Dominance R [x] L []      COGNITION/  PERCEPTION: INTACT IMPAIRED   (See Comments)   Orientation [x]     Vision [x]     Hearing [] Hard of hearing/hearing concerns, No hearing aid   Cognition  [x]     Perception []       MOBILITY: I Mod I S SBA CGA Min Mod Max Total  NT x2 Comments:   Bed Mobility    Rolling [] [] [] [] [x] [] [] [] [] [] []    Supine to Sit [] [] [] [] [x] [] [] [] [] [] [] RW   Scooting [] [] [] [] [x] [] [] [] [] [] []    Sit to Supine [] [] [] [] [] [] [] [] [] [x] []    Transfers    Sit to Stand [] [] [] [] [x] [] [] [] [] [] []    Bed to Chair [] [] [] [] [x] [] [] [] [] [] [] RW   Stand to Sit [] [] [] [] [x] [] [] [] [] [] []    Tub/Shower [] [] [] [] [] [] [] [] [] [x] []     Toilet [] [] [] [] [] [] [] [] [] [x] []      [] [] [] [] [] [] [] [] [] [] []    I=Independent, Mod I=Modified Independent, S=Supervision/Setup, SBA=Standby Assistance, CGA=Contact Guard Assistance, Min=Minimal Assistance, Mod=Moderate Assistance, Max=Maximal Assistance, Total=Total Assistance, NT=Not Tested    ACTIVITIES OF DAILY LIVING: I Mod I S SBA CGA Min Mod Max Total NT Comments   BASIC ADLs:              Upper Body Bathing  [] [] [] [x] [] [] [] [] [] [] BUE Washing standing at sink   Lower Body Bathing [] [] [] [] [x] [] [] [] [] []    Toileting [] [] [] [] [x] [] [] [] [] []    Upper Body Dressing [] [] [] [x] [] [] [] [] [] []    Lower Body Dressing [] [] [] [] [x] [] [] [] [] [] Doff/don socks EOB   Feeding [] [x] [] [] [] [] [] [] [] []    Grooming [] [] [] [] [x] [] [] [] [] [] Oral hygiene and face washing standing at sink no AD   Personal Device Care [] [] [] [] [] [] [] [] [] [x]    Functional Mobility [] [] [] [] [x] [] [] [] [] [] Household mobility for ADLs in room and on unit for ADLs with RW   I=Independent, Mod I=Modified Independent, S=Supervision/Setup, SBA=Standby Assistance, CGA=Contact Guard Assistance, Min=Minimal Assistance, Mod=Moderate Assistance, Max=Maximal Assistance, Total=Total Assistance, NT=Not Tested    PLAN:   FREQUENCY/DURATION   OT Plan of Care: 3 times/week for duration of hospital stay or until stated goals are met, whichever comes first.    PROBLEM LIST:   (Skilled intervention is medically necessary to address:)  Decreased ADL/Functional Activities  Decreased Activity Tolerance  Decreased Balance  Decreased Coordination  Decreased Gait Ability  Decreased Strength  Decreased Transfer Abilities   INTERVENTIONS PLANNED:  (Benefits and precautions of occupational therapy have been discussed with the patient.)  Self Care Training  Therapeutic Activity  Therapeutic Exercise/HEP  Neuromuscular Re-education  Manual Therapy  Education         TREATMENT:     EVALUATION: LOW COMPLEXITY: (Untimed Charge)    TREATMENT:   Co-Treatment PT/OT necessary due to patient's decreased overall endurance/tolerance levels, as well as need for high level skilled assistance to complete functional transfers/mobility and functional tasks  Neuromuscular Re-education (13 Minutes): Neuromuscular Re-education included Balance Training, Coordination training, Functional mobility with facilitation, Postural training, Sitting balance training, and Standing balance training to improve Balance, Coordination, Functional Mobility, and Postural Control. Self Care (15 minutes): Patient participated in upper body bathing, lower body dressing, and grooming ADLs in unsupported sitting and standing with no verbal cueing to increase independence, decrease assistance required, and increase activity tolerance. Patient also participated in functional mobility, functional transfer, and energy conservation training to increase independence, decrease assistance required, and increase activity tolerance.      TREATMENT GRID:  N/A    AFTER TREATMENT PRECAUTIONS: Bed/Chair Locked, Call light within reach, Chair, Needs within reach, and RN notified    INTERDISCIPLINARY COLLABORATION:  RN/ PCT, PT/ PTA, and OT/ [place]    EDUCATION:  Education Given To: Patient  Education Provided: Role of Therapy;Plan of Care;Energy Conservation;Precautions  Education Method: Verbal;Demonstration  Barriers to Learning: None  Education Outcome: Verbalized understanding;Demonstrated understanding    TOTAL TREATMENT DURATION AND TIME:  Time In: 0859  Time Out: 0935  Minutes: [facility], OT

## 2022-11-15 NOTE — PROGRESS NOTES
Hospitalist Progress Note   Admit Date:  2022  3:38 PM   Name:  Yamile Segura   Age:  80 y.o. Sex:  female  :  1940   MRN:  [id number]   Room:  319/01    Presenting Complaint: No chief complaint on file. Reason(s) for Admission: NSTEMI (non-ST elevated myocardial infarction) [facility]) [I21.4]     Hospital Course:   66-year-old female with history of RA, aortic stenosis who presented to the ER on  due to headache associated with epigastric abdominal pain. In ER, UA suggestive of UTI. CT head negative. Troponin elevated 205-247. CXR unremarkable. Abdominal US shows possible hepatic cyst; s/p cholecystectomy. Cardiology was contacted by ER physician and thought elevated troponin most likely due to poorly controlled BP. Patient was admitted to [facility] for elevated troponin/NSTEMI. EKG shows no acute ST changes. Cardiology consulted. Patient was started on ASA and heparin drip. No statin as patient has allergic history. Troponin continues to trend up 495 and cardiology recommended for patient to be transferred to [facility]  for further evaluation and management. She underwent LHC on  showed normal coronary arteries with normal LV systolic function; supravalvular aortic stenosis involving area of the aorta above the sinotubular junction; 2-3+ aortic regurgitation. She had R arm pain and edema after LHC. 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 Vascular Surgery consulted. She was started on Rocephin empirically for UTI on admission as well. Subjective & 24hr Events (11/15/22): Pt alert and oriented x3. Says beside R arm swelling she is doing well overall. No acute events. Otherwise, she denies headache, SOB, chest pain, abdominal pain, nausea, vomiting. Assessment & Plan:     NSTEMI   Elevated Troponin  Aortic valve regurgitation  HS-Trop on admission 205>247>495. EKG showed no acute changes on admission.    TTE 11/13 with EF 60-65% with normal wall motion. S/P LHC on 11/14 showed normal coronary arteries with normal LV systolic function; supravalvular aortic stenosis involving area of the aorta above the sinotubular junction; 2-3+ aortic regurgitation. Monitor pt on telemetry  Off heparin gtt  Cont ASA  Cardiology on board, appreciate recs  Arterial duplex of RUE--small thrombosed distal R radial artery pseudoaneurysm at level of wrist  Cards consulted Vascular Surgery    Acute UTI  Abx:Rocephin (11/12-. ..) empirically pending UCx  No urine culture was obtained on admission, defer for now as pt has been on IV abx    HTN  Continue hydralazine    RA  Hold home methotrexate but can resume on discharge    GERD  Cont PPI  Cont Pepcid, change to po      Anticipated discharge needs:      Hopeful for tomorrow pending card's and VS recs    I spent 36 minutes of time caring for this patient on the unit nearby or at bedside, and more than 50 percent was spent on coordination of care activities, and/or patient/family counseling regarding status and plan of care. Diet:  ADULT DIET;  Regular; Low Fat/Low Chol/High Fiber/TIFFANIE; Vegan  DVT PPx: SCD\"s  Code status: Full Code    Hospital Problems:  Principal Problem (Resolved):    NSTEMI (non-ST elevated myocardial infarction) ([facility] [address]  Active Problems:    Elevated troponin    Nonrheumatic aortic valve disorder    Essential hypertension, benign    Esophageal reflux    Rheumatoid arthritis involving multiple joints (HCC)    Pure hypercholesterolemia      Objective:   Patient Vitals for the past 24 hrs:   Temp Pulse Resp BP SpO2   11/15/22 1056 -- 69 18 (!) 158/64 98 %   11/15/22 0737 98.3 °F (36.8 °C) 77 18 (!) 163/62 96 %   11/15/22 0414 98.4 °F (36.9 °C) 71 16 (!) 161/62 98 %   11/15/22 0038 98.5 °F (36.9 °C) 81 16 (!) 155/61 97 %   11/14/22 2033 -- -- -- (!) 138/56 --   11/14/22 2014 98.5 °F (36.9 °C) 80 16 (!) 143/62 95 %   11/14/22 1720 98 °F (36.7 °C) 83 16 (!) 165/64 97 %   11/14/22 1520 -- -- -- (!) 128/52 --   11/14/22 1343 -- -- -- (!) 174/70 --   11/14/22 1237 98.6 °F (37 °C) 70 16 (!) 165/77 97 %   11/14/22 1145 -- 72 -- (!) 147/75 96 %   11/14/22 1130 -- 69 -- (!) 149/55 96 %   11/14/22 1121 -- 77 -- (!) 152/61 95 %       Oxygen Therapy  SpO2: 98 %  Pulse via Oximetry: 72 beats per minute  O2 Device: None (Room air)    Estimated body mass index is 22.26 kg/m² as calculated from the following:    Height as of this encounter: 4' 11\" (1.499 m). Weight as of this encounter: 110 lb 3.2 oz (50 kg). Intake/Output Summary (Last 24 hours) at 11/15/2022 1115  Last data filed at 11/15/2022 0420  Gross per 24 hour   Intake 240 ml   Output 1303 ml   Net -1063 ml         Physical Exam:     Blood pressure (!) 158/64, pulse 69, temperature 98.3 °F (36.8 °C), temperature source Oral, resp. rate 18, height 4' 11\" (1.499 m), weight 110 lb 3.2 oz (50 kg), SpO2 98 %. General:    Well nourished. Head:  Normocephalic, atraumatic  Eyes:  Sclerae appear normal.  Pupils equally round. ENT:  Nares appear normal, no drainage. Moist oral mucosa  Neck:  No restricted ROM. Trachea midline   CV:   RRR. No m/r/g. No jugular venous distension. Lungs:   CTAB. No wheezing, rhonchi, or rales. Symmetric expansion. Abdomen: Bowel sounds present. Soft, nontender, nondistended. Extremities: No cyanosis or clubbing. Ecchymosis and edema on R arm. Pulse and sensation intact. No restriction ROM at wrist for fingers on RUE. Skin:     Warm and dry. Neuro:  CN II-XII grossly intact. Sensation intact. A&Ox3  Psych:  Normal mood and affect.       I have personally reviewed labs and tests showing:  Recent Labs:  Recent Results (from the past 48 hour(s))   Anti-Xa, Unfractionated Heparin    Collection Time: 11/14/22  4:29 AM   Result Value Ref Range    Anti-XA Unfrac Heparin 0.47 0.3 - 0.7 IU/mL   CBC    Collection Time: 11/14/22  4:29 AM   Result Value Ref Range    WBC 3.4 (L) 4.3 - 11.1 K/uL    RBC 3.06 (L) 4.05 - 5.2 M/uL    Hemoglobin 9.9 (L) 11.7 - 15.4 g/dL    Hematocrit 30.7 (L) 35.8 - 46.3 %    .3 82 - 102 FL    MCH 32.4 26.1 - 32.9 PG    MCHC 32.2 31.4 - 35.0 g/dL    RDW 16.7 (H) 11.9 - 14.6 %    Platelets 386 (L) 803 - 450 K/uL    MPV 11.3 9.4 - 12.3 FL    nRBC 0.00 0.0 - 0.2 K/uL   Basic Metabolic Panel    Collection Time: 11/14/22  4:29 AM   Result Value Ref Range    Sodium 140 133 - 143 mmol/L    Potassium 4.0 3.5 - 5.1 mmol/L    Chloride 109 101 - 110 mmol/L    CO2 28 21 - 32 mmol/L    Anion Gap 3 2 - 11 mmol/L    Glucose 79 65 - 100 mg/dL    BUN 14 8 - 23 MG/DL    Creatinine 0.70 0.6 - 1.0 MG/DL    Est, Glom Filt Rate >60 >60 ml/min/1.73m2    Calcium 9.0 8.3 - 10.4 MG/DL   Transthoracic echocardiogram (TTE) limited with contrast, bubble, strain, and 3D PRN    Collection Time: 11/14/22 10:02 AM   Result Value Ref Range    LV EDV A2C 104 mL    LV EDV A4C 80 mL    LV ESV A2C 33 mL    LV ESV A4C 26 mL    IVSd 0.9 0.6 - 0.9 cm    LVIDd 4.3 3.9 - 5.3 cm    LVIDs 2.7 cm    LVPWd 0.9 0.6 - 0.9 cm    LV Ejection Fraction A2C 69 %    LV Ejection Fraction A4C 68 %    EF BP 69 55 - 100 %    RVIDd 2.7 cm    Body Surface Area 1.39 m2    Fractional Shortening 2D 37 28 - 44 %    LV ESV Index A4C 19 mL/m2    LV EDV Index A4C 58 mL/m2    LV ESV Index A2C 24 mL/m2    LV EDV Index A2C 75 mL/m2    LVIDd Index 3.09 cm/m2    LVIDs Index 1.94 cm/m2    LV RWT Ratio 0.42     LV Mass 2D 123.3 67 - 162 g    LV Mass 2D Index 88.7 43 - 95 g/m2   Cardiac procedure    Collection Time: 11/14/22 11:20 AM   Result Value Ref Range    Body Surface Area 1.39 m2   CBC    Collection Time: 11/15/22  8:43 AM   Result Value Ref Range    WBC 7.6 4.3 - 11.1 K/uL    RBC 3.44 (L) 4.05 - 5.2 M/uL    Hemoglobin 11.3 (L) 11.7 - 15.4 g/dL    Hematocrit 34.5 (L) 35.8 - 46.3 %    .3 82 - 102 FL    MCH 32.8 26.1 - 32.9 PG    MCHC 32.8 31.4 - 35.0 g/dL    RDW 17.1 (H) 11.9 - 14.6 %    Platelets 391 (L) 786 - 450 K/uL    MPV 11.6 9.4 - 12.3 FL nRBC 0.00 0.0 - 0.2 K/uL   Basic Metabolic Panel    Collection Time: 11/15/22  8:43 AM   Result Value Ref Range    Sodium 142 133 - 143 mmol/L    Potassium 3.5 3.5 - 5.1 mmol/L    Chloride 109 101 - 110 mmol/L    CO2 29 21 - 32 mmol/L    Anion Gap 4 2 - 11 mmol/L    Glucose 106 (H) 65 - 100 mg/dL    BUN 14 8 - 23 MG/DL    Creatinine 0.70 0.6 - 1.0 MG/DL    Est, Glom Filt Rate >60 >60 ml/min/1.73m2    Calcium 9.3 8.3 - 10.4 MG/DL       I have personally reviewed imaging studies showing: Other Studies:  Vascular duplex upper extremity pseudoaneurysm check right   Final Result   Findings consistent with a small thrombosed distal right radial artery   pseudoaneurysm at the level of the wrist, as above. US ABDOMEN COMPLETE   Final Result   There is a 2.4 x 1.9 x 1.9 cm anechoic avascular structure located in the right   lobe. This appears to represent a cyst.  The margins are slightly irregular. The significance of this is unclear. The liver is otherwise unremarkable. Status post cholecystectomy. Other findings as above.           Current Meds:  Current Facility-Administered Medications   Medication Dose Route Frequency    hydrALAZINE (APRESOLINE) tablet 50 mg  50 mg Oral 2 times per day    famotidine (PEPCID) 20 mg in sodium chloride (PF) 0.9 % 10 mL injection  20 mg IntraVENous Once    hydrocortisone sodium succinate PF (SOLU-CORTEF) injection 100 mg  100 mg IntraVENous Once    cholestyramine light packet 4 g  4 g Oral Nightly    sodium chloride flush 0.9 % injection 5-40 mL  5-40 mL IntraVENous 2 times per day    sodium chloride flush 0.9 % injection 5-40 mL  5-40 mL IntraVENous PRN    0.9 % sodium chloride infusion   IntraVENous PRN    ondansetron (ZOFRAN-ODT) disintegrating tablet 4 mg  4 mg Oral Q8H PRN    Or    ondansetron (ZOFRAN) injection 4 mg  4 mg IntraVENous Q6H PRN    polyethylene glycol (GLYCOLAX) packet 17 g  17 g Oral Daily PRN    acetaminophen (TYLENOL) tablet 650 mg  650 mg Oral Q6H PRN    Or    acetaminophen (TYLENOL) suppository 650 mg  650 mg Rectal Q6H PRN    Vitamin D (CHOLECALCIFEROL) tablet 2,000 Units  2,000 Units Oral Q MWF    acetaminophen (TYLENOL) tablet 1,000 mg  1,000 mg Oral q8h    ezetimibe (ZETIA) tablet 10 mg  10 mg Oral Daily    famotidine (PEPCID) tablet 20 mg  20 mg Oral Nightly    folic acid (FOLVITE) tablet 5 mg  5 mg Oral Daily    magnesium oxide (MAG-OX) tablet 400 mg  400 mg Oral Dinner    oxybutynin (DITROPAN-XL) extended release tablet 10 mg  10 mg Oral Daily    oxyCODONE (ROXICODONE) immediate release tablet 5 mg  5 mg Oral Daily PRN    pantoprazole (PROTONIX) tablet 40 mg  40 mg Oral Daily    hydrALAZINE (APRESOLINE) injection 10 mg  10 mg IntraVENous Q6H PRN    aspirin chewable tablet 81 mg  81 mg Oral Daily    cefTRIAXone (ROCEPHIN) 1,000 mg in sodium chloride 0.9 % 50 mL IVPB mini-bag  1,000 mg IntraVENous Q24H    carvedilol (COREG) tablet 6.25 mg  6.25 mg Oral BID WC       Signed: Dave Holbrook DO    Part of this note may have been written by using a voice dictation software. The note has been proof read but may still contain some grammatical/other typographical errors.

## 2022-11-15 NOTE — PROGRESS NOTES
[facility] CARDIOLOGY PROGRESS NOTE           11/15/2022 8:31 AM    Admit Date: 11/13/2022      Subjective:   Patient denies any active chest discomfort. Cardiac catheterization showed normal coronary arteries. She does have supravalvular aortic stenosis. Elevated troponin likely from poorly controlled hypertension and supply/demand imbalance. Right radial access site sore. Ultrasound performed but results not available. ROS:  Cardiovascular:  As noted above    Objective:      Vitals:    11/14/22 2033 11/15/22 0038 11/15/22 0414 11/15/22 0737   BP: (!) 138/56 (!) 155/61 (!) 161/62 (!) 163/62   Pulse:  81 71 77   Resp:  16 16 18   Temp:  98.5 °F (36.9 °C) 98.4 °F (36.9 °C) 98.3 °F (36.8 °C)   TempSrc:  Oral Oral Oral   SpO2:  97% 98% 96%   Weight:   110 lb 3.2 oz (50 kg)    Height:           Physical Exam:  General-No Acute Distress  Neck- supple, no JVD   CV- regular rate and rhythm Grade I/VISEM  Lung- clear bilaterally  Abd- soft, nontender, nondistended  Ext- no edema bilaterally. Skin- warm and dry      Data Review:   Recent Labs     11/14/22 0429 11/13/22  0401 11/12/22  1254   WBC 3.4* 4.0* 5.0   HGB 9.9* 10.8* 12.5   HCT 30.7* 33.2* 38.5   .3 98.8 98.5   * 110* 132*       Recent Labs     11/14/22 0429 11/13/22  0401    141   K 4.0 3.2*    106   CO2 28 28   BUN 14 17   CREATININE 0.70 0.78   GLUCOSE 79 92   CALCIUM 9.0 9.0   PROT  --  5.9*   LABALBU  --  2.7*   BILITOT  --  0.6   ALKPHOS  --  74   AST  --  36   ALT  --  21   LABGLOM >60 >60   GLOB  --  3.2       No results for input(s): CKTOTAL, CKMB, CKMBINDEX, DDIMER, TROPONINI in the last 720 hours. Assessment/Plan:     Active Hospital Problems    Elevated troponin  Normal coronary arteries. Supply/demand imbalance. Adjust blood pressure medication for appropriate control. On carvedilol. Increase hydralazine to 50 twice daily. Await ultrasound results.   If blood pressure controlled and ultrasound without pseudoaneurysm likely could be discharged home later today with follow-up in our office in 1 week      Nonrheumatic aortic valve disorder  Has moderate aortic regurgitation and supravalvular stenosis of the aortic root. Can follow as an outpatient. Essential hypertension, benign  Increase hydralazine to 50 mg twice daily. Esophageal reflux  Continue Protonix. Rheumatoid arthritis involving multiple joints ([address]  Defer to primary team.      Pure hypercholesterolemia  On Zetia.                 Nan Stallworth MD

## 2022-11-16 ENCOUNTER — CARE COORDINATION (OUTPATIENT)
Dept: CARE COORDINATION | Facility: CLINIC | Age: 82
End: 2022-11-16

## 2022-11-16 ENCOUNTER — TELEPHONE (OUTPATIENT)
Dept: FAMILY MEDICINE CLINIC | Facility: CLINIC | Age: 82
End: 2022-11-16

## 2022-11-16 NOTE — CARE COORDINATION
[facility] Care Transitions Initial Follow Up Call    Call within 2 business days of discharge: Yes    Care Transition Nurse contacted the patient by telephone to perform post hospital discharge assessment. Verified name and  with patient as identifiers. Provided introduction to self, and explanation of the Care Transition Nurse role. Patient: Yamile Segura Patient : 1940   MRN: [id number]  Reason for Admission: NSTEMI  Discharge Date: 11/15/22 RARS: Readmission Risk Score: 13.6      Last Discharge  Street       Date Complaint Diagnosis Description Type Department Provider    22  Elevated troponin . .. Admission (Discharged) MIKE Amezquita [address], DO            Was this an external facility discharge? No Discharge Facility: NSTEMI    Challenges to be reviewed by the provider   Additional needs identified to be addressed with provider: No  none               Method of communication with provider: none. below    Care Transition Nurse reviewed discharge instructions, medical action plan, and red flags with patient who verbalized understanding. The patient was given an opportunity to ask questions and does not have any further questions or concerns at this time. Were discharge instructions available to patient? Yes. Reviewed appropriate site of care based on symptoms and resources available to patient including: PCP  Specialist  Home health  CTN . The patient agrees to contact the PCP office for questions related to their healthcare. Advance Care Planning:   Does patient have an Advance Directive: reviewed and current. Medication reconciliation was performed with patient, who verbalizes understanding of administration of home medications.  Medications reviewed, 1111F entered: yes    Was patient discharged with a pulse oximeter? no    Non-face-to-face services provided:  Scheduled appointment with PCP-patient calling for PCP appt  Scheduled appointment with Specialist-  Obtained and reviewed discharge summary and/or continuity of care documents    Offered patient enrollment in the Remote Patient Monitoring (RPM) program for in-home monitoring: NA.    Care Transitions 24 Hour Call    Schedule Follow Up Appointment with PCP: Completed  Do you have a copy of your discharge instructions?: Yes  Do you have all of your prescriptions and are they filled?: Yes  Have you been contacted by a [facility] Pharmacist?: No  Have you scheduled your follow up appointment?: Yes (Comment: cardiology has appt set, Patient calling PCP this morning for appt)  How are you going to get to your appointment?: Car - family or friend to transport  Do you have support at home?: Alone  Do you feel like you have everything you need to keep you well at home?: Yes  Are you an active caregiver in your home?: No  Care Transitions Interventions    Physical Therapy: Completed    Disease Specific Clinic: Completed Occupational Therapy: Completed     Disease Association: Completed               Follow Up  Future Appointments   Date Time Provider Abimael Nicolas   11/30/2022 12:45 PM Jhon Gutierrez MD [facility]   5/1/2023  1:30 PM Asim Cabrera MD [facility]       Care Transition Nurse provided contact information. Plan for follow-up call in 5-7 days based on severity of symptoms and risk factors.   Plan for next call: symptom management-assess for new or worsening s/s  follow-up appointment-assess for patient obtaining PCP johnt    Roseanna Barton RN

## 2022-11-17 ENCOUNTER — TELEPHONE (OUTPATIENT)
Dept: FAMILY MEDICINE CLINIC | Facility: CLINIC | Age: 82
End: 2022-11-17

## 2022-11-17 ENCOUNTER — OFFICE VISIT (OUTPATIENT)
Dept: FAMILY MEDICINE CLINIC | Facility: CLINIC | Age: 82
End: 2022-11-17

## 2022-11-17 VITALS
SYSTOLIC BLOOD PRESSURE: 170 MMHG | HEIGHT: 59 IN | WEIGHT: 110 LBS | OXYGEN SATURATION: 98 % | HEART RATE: 82 BPM | BODY MASS INDEX: 22.18 KG/M2 | TEMPERATURE: 98 F | DIASTOLIC BLOOD PRESSURE: 70 MMHG

## 2022-11-17 DIAGNOSIS — Z09 HOSPITAL DISCHARGE FOLLOW-UP: ICD-10-CM

## 2022-11-17 DIAGNOSIS — I10 UNCONTROLLED HYPERTENSION: Primary | ICD-10-CM

## 2022-11-17 RX ORDER — HYDRALAZINE HYDROCHLORIDE 50 MG/1
50 TABLET, FILM COATED ORAL 3 TIMES DAILY
Qty: 360 TABLET | Refills: 3 | Status: SHIPPED | OUTPATIENT
Start: 2022-11-17

## 2022-11-17 RX ORDER — ASPIRIN 81 MG/1
81 TABLET ORAL
COMMUNITY
End: 2022-11-17 | Stop reason: CLARIF

## 2022-11-17 RX ORDER — PREGABALIN 100 MG/1
CAPSULE ORAL
COMMUNITY
Start: 2022-10-31

## 2022-11-17 RX ORDER — NALOXONE HYDROCHLORIDE 4 MG/.1ML
4 SPRAY NASAL
COMMUNITY
Start: 2022-07-12

## 2022-11-17 ASSESSMENT — ENCOUNTER SYMPTOMS
ALLERGIC/IMMUNOLOGIC NEGATIVE: 1
EYES NEGATIVE: 1
GASTROINTESTINAL NEGATIVE: 1
RESPIRATORY NEGATIVE: 1

## 2022-11-17 NOTE — PROGRESS NOTES
Ivan Yepez (: 1940) is a 80 y.o. female, established patient, here for evaluation of the following chief complaint(s):  Follow-Up from Hospital (Elevated tropin, hypertension and acute cystitis without hematuria ) and Medication Problem (Hydralazine /)       ASSESSMENT/PLAN:  1. Uncontrolled hypertension  Recent hospitalization for nstemi, hypertensive emergency, now with persistently high bp, 170/70, will increase hydralazine to 50mg po TID, and needs to monitor closely  Has f/u with cardiology on   SUBJECTIVE/OBJECTIVE:  HPI  Hypertensive emergency previously, admitted, underwent cath, discharged on coreg, furosemide and hydralazine, patient taking as prescribed, found to have elevated bp at home via home health, sent to our office. No headache, chest pain, abdominal pain or shortness of breath at this time. Review of Systems   Constitutional: Negative. HENT: Negative. Eyes: Negative. Respiratory: Negative. Cardiovascular: Negative. Gastrointestinal: Negative. Endocrine: Negative. Genitourinary: Negative. Musculoskeletal:  Positive for arthralgias and myalgias. Skin: Negative. Allergic/Immunologic: Negative. Neurological: Negative. Psychiatric/Behavioral: Negative. All other systems reviewed and are negative. Physical Exam  Vitals and nursing note reviewed. Constitutional:       General: She is not in acute distress. Appearance: Normal appearance. She is not ill-appearing. HENT:      Head: Normocephalic and atraumatic. Right Ear: External ear normal.      Left Ear: External ear normal.      Nose: Nose normal.      Mouth/Throat:      Mouth: Mucous membranes are dry. Eyes:      Extraocular Movements: Extraocular movements intact. Pupils: Pupils are equal, round, and reactive to light. Cardiovascular:      Rate and Rhythm: Normal rate. Pulses: Normal pulses.    Pulmonary:      Effort: Pulmonary effort is normal. Breath sounds: Normal breath sounds. Abdominal:      General: There is no distension. Musculoskeletal:         General: Normal range of motion. Cervical back: Normal range of motion and neck supple. Skin:     General: Skin is warm and dry. Comments: Bruising extensively on right arm   Neurological:      General: No focal deficit present. Mental Status: She is alert and oriented to person, place, and time. Psychiatric:         Mood and Affect: Mood normal.         On this date 11/17/22  I have spent 30 minutes reviewing previous notes, test results and face to face with the patient discussing the diagnosis and importance of compliance with the treatment plan as well as documenting on the day of the visit. An electronic signature was used to authenticate this note.   -- Kameron Farr MD

## 2022-11-17 NOTE — TELEPHONE ENCOUNTER
Patients BP is 188/98 when home health nurse right around 12:15 after the patient had taken all BP medications. Patient had a heart attack 11/12. Home health nurse was concerned.  Patient needs to be updated on plan of care for BP

## 2022-11-22 ENCOUNTER — CARE COORDINATION (OUTPATIENT)
Dept: CARE COORDINATION | Facility: CLINIC | Age: 82
End: 2022-11-22

## 2022-11-29 NOTE — PROGRESS NOTES
[facility] CARDIOLOGY  [address], [address]  PHONE: [phone]      22    NAME:  Janey Sebastian  : 1940  MRN: [id number]         SUBJECTIVE:   Janey Sebastian is a 80 y.o. female seen for a follow up visit regarding the following:     Chief Complaint   Patient presents with    Follow-Up from Hospital              HPI:  Follow up  Follow-Up from Hospital   .    Follow up supravalvular aortic stenosis with plans for intervention only for severe symptoms. Patient with severe  back pain/spinal stenosis, rheumatoid arthritis. Severe osteoporosis, GI bleed, weight loss, debility we have recommended conservative management and serial evaluation. Significant events since last visit. Admitted  with UTI, NSTEMI ultimately found to be d/t demand ischemia. LHC on  showed normal coronary arteries with normal LV systolic function; supravalvular aortic stenosis involving area of the aorta above the sinotubular junction; 2-3+ aortic regurgitation. She had R arm pain and edema after LHC. 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 Vascular Surgery consulted and recommended no surgical intervention as patient has no deficits in her motor and sensory function     At home, her BP has remained high 140's-150's both on her monitor and with HH. As it did today, at home, it will be high on first check, will drop about 20 points after she relaxes a bit. Reviewed home dairy with her. Lowest reading has been 148/51. Dr Sheldon Hassan increased her hydralazine to TID dosing about 2 weeks ago    Prior to this admission, she had been admitted to [facility] with yet another acute UGIB d/t ulceration, hgb to 6, transfused, stopped celebrex. She's been on no regular pain meds. She reviewed her situation with PCP who encouraged her to take the oxycodone since her pain is driving much of the BP elevation.   She has not taken it regularly since then, remains understandably reticent but we discussed that further today as well    Breathing ok, no edema, uses lasix rarely for \"edema\", no cp. Right arm ecchymosis is  but she notes it is improving. Past cardiac history:   Echo 7/19/12: moderate AI, normal chamber sizes and EF   Aug 2013: mild to moderate AI, normal LV size and EF, grade I diastolic function. Subaortic membrane noted in some views, peak gradient 30, not hemodynamically significant. Sep 2014 - EF 60-65%, mild AS, peak 27, mean 16. Mild AI, no subaortic membrane, RVSP 32   Sep 2015 - EF 60-65%, moderate AI, mean gradient 20, peak gradient 40, again the valve itself appears normal, now the reader questions supraortic stenosis   Cardiac MRI - narrowing of the caudal aspect of the ascending aorta, just cephalad to the valve, resulting in mild stenosis. Moderate regurgitation. No abnormality of the valve itself and no post stenotic dilatation. Normal LV size and EF 81%   Sep 2016 - EF 68%, mild AI, AV sclerosis, no stenosis   Sep 2017- EF 67%, mild AV sclerosis/stenosis, mean gradient 12, COY 1.3   Oct 2018 - normal SF and DF, normal size, moderate AI, supravalvular gradient 22, RVSP 45   Oct 2019 normal SF and DF, moderate AI, peak supravalvular gradient 32   Sep 2020- normal SF, abnormal DF peak gradient 28, bulky calcium at sinotubular junction, consider TAVR CTA if it would alter clinical management   Sep 2021- EF 55-60%, impaired relaxation, mean AV gradient 13, mild AS/Mild AI, RVSP 34  Nov 2022       [facility] - normal coronary arteries. 2-3+ AI with supravalvular AS, complicated by small distal radial artery PSA, no intervention required, gradient < 10 mmHg       Limited echo - EF 60-65%          Key CAD CHF Meds            furosemide (LASIX) 20 MG tablet (Taking)    Sig - Route:  Take 1 tablet by mouth daily TAKE 1 TABLET BY MOUTH EVERY DAY prn leg swelling - Oral    carvedilol (COREG) 12.5 MG tablet (Taking)    Sig - Route: Take 1 tablet by mouth 2 times daily (with meals) - Oral    hydrALAZINE (APRESOLINE) 50 MG tablet (Taking)    Sig - Route: Take 1 tablet by mouth 3 times daily - Oral    ezetimibe (ZETIA) 10 MG tablet (Taking)    Sig - Route: Take 1 tablet by mouth in the morning. TAKE 1 TABLET BY MOUTH EVERY DAY. - Oral    colestipol (COLESTID) 1 g tablet (Taking)    Sig - Route: Take 1 tablet by mouth daily TAKE 1 TABLET BY MOUTH EVERY DAY - Oral    niacin (NIASPAN) 500 MG extended release tablet (Taking)    Class: Historical Med              Past Medical History, Past Surgical History, Family history, Social History, and Medications were all reviewed with the patient today and updated as necessary. Prior to Admission medications    Medication Sig Start Date End Date Taking? Authorizing Provider   furosemide (LASIX) 20 MG tablet Take 1 tablet by mouth daily TAKE 1 TABLET BY MOUTH EVERY DAY prn leg swelling 11/30/22  Yes Lori Bean MD   carvedilol (COREG) 12.5 MG tablet Take 1 tablet by mouth 2 times daily (with meals) 11/30/22  Yes Lori Bean MD   Coenzyme Q10 (COQ10 PO) daily 1/3/08  Yes Historical Provider, MD   hydrALAZINE (APRESOLINE) 50 MG tablet Take 1 tablet by mouth 3 times daily 11/17/22  Yes Fide Patino MD   methotrexate (RHEUMATREX) 2.5 MG chemo tablet 7.5 mg once a week Take 3 2.5 mg tablets every Friday 10/12/22  Yes Historical Provider, MD   oxyCODONE (ROXICODONE) 5 MG immediate release tablet TAKE 1/2 TO 1 (ONE-HALF TO ONE) TABLET BY MOUTH ONCE DAILY AS NEEDED FOR SEVERE EPISODE PAIN 10/19/22  Yes Historical Provider, MD   pantoprazole (PROTONIX) 40 MG tablet TAKE 1 TABLET BY MOUTH EVERY DAY 9/7/22  Yes Fide Patino MD   ezetimibe (ZETIA) 10 MG tablet Take 1 tablet by mouth in the morning.  TAKE 1 TABLET BY MOUTH EVERY DAY. 8/10/22  Yes Fide Patino MD   calcium citrate-vitamin d 250-200 MG-UNIT TABS Take by mouth daily 2/4/10  Yes Historical Provider, MD   colestipol (COLESTID) 1 g tablet Take 1 tablet by mouth daily TAKE 1 TABLET BY MOUTH EVERY DAY 5/26/22  Yes Donald Calixto MD   UNABLE TO FIND 2 times daily Fish oil /Omega 3 vegan liquid gel caps.    Yes Historical Provider, MD   magnesium (MAGNESIUM-OXIDE) 250 MG TABS tablet Take 250 mg by mouth Daily with supper   Yes Historical Provider, MD   UNABLE TO FIND daily Apple Pectin 500 mg take with niacin   Yes Historical Provider, MD   Menaquinone-7 (VITAMIN K2 PO) Take by mouth   Yes Ar Automatic Reconciliation   ACETAMINOPHEN ER PO Take 1,000 mg by mouth 3 times daily (with meals)   Yes Ar Automatic Reconciliation   ascorbic acid (VITAMIN C) 500 MG tablet Take 500 mg by mouth 2 times daily 2/4/10  Yes Ar Automatic Reconciliation   coenzyme Q10 100 MG CAPS capsule Take 100 mg by mouth daily 2/4/10  Yes Ar Automatic Reconciliation   famotidine (PEPCID) 40 MG tablet TAKE 1 TABLET BY MOUTH EVERY DAY AT NIGHT 3/15/22  Yes Ar Automatic Reconciliation   folic acid (FOLVITE) 1 MG tablet Takes 5 tabs qd 7/7/15  Yes Ar Automatic Reconciliation   niacin (NIASPAN) 500 MG extended release tablet Take 500 mg by mouth daily 11/23/21  Yes Ar Automatic Reconciliation   oxybutynin (DITROPAN-XL) 10 MG extended release tablet 10 mg daily 12/30/19  Yes Ar Automatic Reconciliation   pregabalin (LYRICA) 100 MG capsule TAKE 1 CAPSULE BY MOUTH NIGHTLY  Patient not taking: Reported on 11/30/2022 10/31/22   Historical Provider, MD   naloxone 4 MG/0.1ML LIQD nasal spray 4 mg once as needed  Patient not taking: No sig reported 7/12/22   Historical Provider, MD Omar Rivassa [address] 1045 in the morning and at bedtime Ginko Biloba 60 mg one tablet BID    Historical Provider, MD   UNABLE TO FIND Rolator walker light    Historical Provider, MD   Cholecalciferol 50 MCG (2000 UT) TABS Take 6,000 Units by mouth daily    Ar Automatic Reconciliation     Allergies   Allergen Reactions    Belladonna Alkaloids Other (See Comments)    Fluvastatin Sodium Other (See Comments)    Amoxicillin Other (See Comments)     Causes increased methotrexate levels    Azithromycin Diarrhea    Benazepril      Unknown to pt       Celecoxib Other (See Comments)     GI upset. As of 1/6/2021 pt states she does not have a reaction to this and takes Celebrex    Cyclobenzaprine Other (See Comments)    Ergotamine Other (See Comments)     Abdominal pain     Fluvastatin Other (See Comments)    Ibandronic Acid Other (See Comments)     Severe bone pain    Iohexol Other (See Comments)     Pt states she has never had and does not want.     Lactose Other (See Comments)    Lotrel [Amlodipine Besy-Benazepril Hcl]      Headache, rash       Other Myalgia and Other (See Comments)    Penbritin-S [Ampicillin]      diarrhea      Phenobarbital Other (See Comments)    Pravastatin Other (See Comments)    Risedronate Other (See Comments)     Severe bone pain all over    Rosuvastatin Other (See Comments)    Shellfish Allergy Diarrhea    Simvastatin Other (See Comments)     Also Lescol, Zorcor, Pravachol and Crestor  All cause muscle pain     Sulfa Antibiotics Itching and Other (See Comments)     Patient states \"tingling in hands and feet\"    Sulfisoxazole Other (See Comments)     Tingling in fingers    Tetracycline Diarrhea and Other (See Comments)     Abdominal pain    Tramadol Hcl Dizziness or Vertigo    Trospium Other (See Comments)    Adhesive Tape Rash and Other (See Comments)     Surgical tape and tegaderm     Amlodipine Rash     Past Medical History:   Diagnosis Date    Aortic regurgitation 9/9/2015    Cho done 9/1/2015     Aortic valve stenosis, mild 10/7/2015    Chest pain 1/20/2016    CHF (congestive heart failure) ([facility] [address] 2004    Chronic pain     Diverticulosis of colon (without mention of hemorrhage) 09/30/2014    Noted on colonoscopy 2010, no Viktor negron    Essential hypertension, benign 09/30/2014    Facet arthropathy, lumbar     Family history of colonic polyps 12/28/2015    GERD (gastroesophageal reflux disease)     Hearing loss 2011    Hip fracture ([facility] [address] 08/2021    Impaired fasting glucose 09/30/2014    Lactose intolerance in adult 09/30/2014    Malaise and fatigue 1/20/2016    Mild intermittent asthma 09/30/2014    MVP (mitral valve prolapse)     Osteoarthritis     Pure hypercholesterolemia 9/30/2014    Rheumatoid arthritis involving multiple joints ([facility] [address] 09/30/2014    Dr. Parisa Foster for colon cancer 12/28/2015    Senile osteoporosis 9/30/2014    Spinal stenosis of lumbar region     Urinary incontinence 09/30/2014     Past Surgical History:   Procedure Laterality Date    CARDIAC PROCEDURE N/A 11/14/2022    LEFT HEART CATH / CORONARY ANGIOGRAPHY performed by Jack Fox MD at [facility] CARDIAC CATH LAB    CATARACT REMOVAL Right     9/8/2011    CHOLECYSTECTOMY  07/08/2011    COLONOSCOPY  07/2020    . No more colonoscopies due to age. HIP FRACTURE SURGERY  08/16/2021    HYSTERECTOMY (CERVIX STATUS UNKNOWN)  01/28/1986    total abdominal    OTHER SURGICAL HISTORY      cauterization of a vessle in nose 2018     Family History   Problem Relation Age of Onset    High Cholesterol Mother     Hypertension Mother     Rheum Arthritis Mother     Anemia Mother     Hearing Loss Mother     Heart Disease Mother     Osteoporosis Mother     Diabetes Father     Colon Polyps Other     Parkinson's Disease Other     Hypertension Other     High Cholesterol Other     Heart Disease Other     Diabetes Other      Social History     Tobacco Use    Smoking status: Never    Smokeless tobacco: Never   Substance Use Topics    Alcohol use: No     Alcohol/week: 0.0 standard drinks       ROS:    Review of Systems   Cardiovascular:  Negative for chest pain. Respiratory:  Negative for shortness of breath. Musculoskeletal:  Positive for arthritis and joint pain.         PHYSICAL EXAM:   BP (!) 196/70   Pulse 84   Ht 4' 11\" (1.499 m)   Wt 105 lb (47.6 kg)   BMI 21.21 kg/m²      Wt Readings from Last 3 Encounters:   11/30/22 105 lb (47.6 kg)   11/17/22 110 lb (49.9 kg)   11/15/22 110 lb 3.2 oz (50 kg)     BP Readings from Last 3 Encounters:   11/30/22 (!) 196/70   11/17/22 (!) 170/70   11/15/22 (!) 158/64     Pulse Readings from Last 3 Encounters:   11/30/22 84   11/17/22 82   11/15/22 69           Physical Exam  Constitutional:       Appearance: Normal appearance. HENT:      Head: Normocephalic and atraumatic. Eyes:      Conjunctiva/sclera: Conjunctivae normal.   Neck:      Vascular: No carotid bruit. Cardiovascular:      Rate and Rhythm: Normal rate and regular rhythm. Heart sounds: No murmur heard. No friction rub. No gallop. Comments: Right arm ecchymosis wrist to elbow with moderate hematoma, radial pulse 2+, distal perfusion excellent    Pulmonary:      Effort: No respiratory distress. Breath sounds: No wheezing or rales. Musculoskeletal:         General: No swelling. Cervical back: Neck supple. Skin:     General: Skin is warm and dry. Neurological:      General: No focal deficit present. Mental Status: She is alert. Psychiatric:         Mood and Affect: Mood normal.         Behavior: Behavior normal.       Medical problems and test results were reviewed with the patient today.      DATA REVIEW    LIPID PANEL     Lab Results   Component Value Date    CHOL 194 11/01/2022    CHOL 178 05/26/2022    CHOL 180 05/06/2021     Lab Results   Component Value Date    TRIG 88 11/01/2022    TRIG 77 05/26/2022    TRIG 51 05/06/2021     Lab Results   Component Value Date    HDL 93 (H) 11/01/2022    HDL 93 (H) 05/26/2022    HDL 92 05/06/2021     Lab Results   Component Value Date    LDLCALC 83.4 11/01/2022    LDLCALC 69.6 05/26/2022    LDLCALC 78 05/06/2021     Lab Results   Component Value Date    LABVLDL 17.6 11/01/2022    LABVLDL 15.4 05/26/2022    LABVLDL 12 07/24/2020    VLDL 10 05/06/2021     Lab Results   Component Value Date    CHOLHDLRATIO 2.1 11/01/2022    CHOLHDLRATIO 1.9 05/26/2022       BMP  Lab Results   Component Value Date/Time     11/15/2022 08:43 AM    K 3.5 11/15/2022 08:43 AM     11/15/2022 08:43 AM    CO2 29 11/15/2022 08:43 AM    BUN 14 11/15/2022 08:43 AM    CREATININE 0.70 11/15/2022 08:43 AM    GLUCOSE 106 11/15/2022 08:43 AM    CALCIUM 9.3 11/15/2022 08:43 AM      EKG        CXR/IMAGING        DEVICE INTERROGATION        OUTSIDE RECORDS REVIEW    Records from outside providers have been reviewed and summarized as noted in the HPI, past history and data review sections of this note, and reviewed with patient. .       ASSESSMENT and PLAN    Evie Peabody was seen today for follow-up from hospital.    Diagnoses and all orders for this visit:    Hypertensive emergency  -     Basic Metabolic Panel; Future    NSTEMI (non-ST elevated myocardial infarction) ([address]    Supravalvular aortic stenosis    Aortic valve insufficiency, acquired    Other orders  -     furosemide (LASIX) 20 MG tablet; Take 1 tablet by mouth daily TAKE 1 TABLET BY MOUTH EVERY DAY prn leg swelling  -     carvedilol (COREG) 12.5 MG tablet; Take 1 tablet by mouth 2 times daily (with meals)        IMPRESSION:    Flash pulmonary edema with demand ischemia d/t uncontrolled hypertension, due in part to uncontrolled arthritic pain. Echoed her PCP recommendation to take the oxycodone, none of us likes recommending it but staying ahead of the pain will help get the BP controlled    At home, her readings are better, 019-115 with diastolic occasionally in the 50's and concomitant AI. Resting HR 80's. Increase carvedilol to 12.5 mg BID, start taking the loop diuretic daily, check BMP in 1-2 weeks, call if BP drops too far and she becomes symptomatic, and work on getting the pain controlled as discussed    2-3+ AI, will improve with BP control, diastolics may dip into the 40's, continue to monitor. Serial echo.   Supravalvular stenosis remains mild and asymptomatic    Recurrent severe GIB, normal coronary arteries, stop ASA. Return in about 4 weeks (around 12/28/2022) for [address] 1-2 WEEKS. Thank you for allowing me to participate in this patient's care. Please call or contact me if there are any questions or concerns regarding the above.       Medardo Gonzales MD  11/30/22  1:06 PM

## 2022-11-30 ENCOUNTER — OFFICE VISIT (OUTPATIENT)
Dept: CARDIOLOGY CLINIC | Age: 82
End: 2022-11-30
Payer: MEDICARE

## 2022-11-30 VITALS
BODY MASS INDEX: 21.17 KG/M2 | DIASTOLIC BLOOD PRESSURE: 70 MMHG | WEIGHT: 105 LBS | HEIGHT: 59 IN | SYSTOLIC BLOOD PRESSURE: 196 MMHG | HEART RATE: 84 BPM

## 2022-11-30 DIAGNOSIS — I35.1 AORTIC VALVE INSUFFICIENCY, ACQUIRED: ICD-10-CM

## 2022-11-30 DIAGNOSIS — I21.4 NSTEMI (NON-ST ELEVATED MYOCARDIAL INFARCTION) (HCC): ICD-10-CM

## 2022-11-30 DIAGNOSIS — Q25.3 SUPRAVALVULAR AORTIC STENOSIS: ICD-10-CM

## 2022-11-30 DIAGNOSIS — I16.1 HYPERTENSIVE EMERGENCY: Primary | ICD-10-CM

## 2022-11-30 PROCEDURE — G8484 FLU IMMUNIZE NO ADMIN: HCPCS | Performed by: INTERNAL MEDICINE

## 2022-11-30 PROCEDURE — 3074F SYST BP LT 130 MM HG: CPT | Performed by: INTERNAL MEDICINE

## 2022-11-30 PROCEDURE — G8400 PT W/DXA NO RESULTS DOC: HCPCS | Performed by: INTERNAL MEDICINE

## 2022-11-30 PROCEDURE — 3078F DIAST BP <80 MM HG: CPT | Performed by: INTERNAL MEDICINE

## 2022-11-30 PROCEDURE — 1090F PRES/ABSN URINE INCON ASSESS: CPT | Performed by: INTERNAL MEDICINE

## 2022-11-30 PROCEDURE — G8427 DOCREV CUR MEDS BY ELIG CLIN: HCPCS | Performed by: INTERNAL MEDICINE

## 2022-11-30 PROCEDURE — 1111F DSCHRG MED/CURRENT MED MERGE: CPT | Performed by: INTERNAL MEDICINE

## 2022-11-30 PROCEDURE — 1123F ACP DISCUSS/DSCN MKR DOCD: CPT | Performed by: INTERNAL MEDICINE

## 2022-11-30 PROCEDURE — 99214 OFFICE O/P EST MOD 30 MIN: CPT | Performed by: INTERNAL MEDICINE

## 2022-11-30 PROCEDURE — G8420 CALC BMI NORM PARAMETERS: HCPCS | Performed by: INTERNAL MEDICINE

## 2022-11-30 PROCEDURE — 1036F TOBACCO NON-USER: CPT | Performed by: INTERNAL MEDICINE

## 2022-11-30 RX ORDER — CARVEDILOL 12.5 MG/1
12.5 TABLET ORAL 2 TIMES DAILY WITH MEALS
Qty: 180 TABLET | Refills: 3 | Status: SHIPPED | OUTPATIENT
Start: 2022-11-30

## 2022-11-30 RX ORDER — FUROSEMIDE 20 MG/1
20 TABLET ORAL DAILY
Qty: 90 TABLET | Refills: 3 | Status: SHIPPED | OUTPATIENT
Start: 2022-11-30

## 2022-11-30 ASSESSMENT — ENCOUNTER SYMPTOMS: SHORTNESS OF BREATH: 0

## 2022-12-01 ENCOUNTER — CARE COORDINATION (OUTPATIENT)
Dept: CARE COORDINATION | Facility: CLINIC | Age: 82
End: 2022-12-01

## 2022-12-01 ENCOUNTER — TELEPHONE (OUTPATIENT)
Dept: CARDIOLOGY CLINIC | Age: 82
End: 2022-12-01

## 2022-12-01 NOTE — CARE COORDINATION
[facility] Care Transitions Follow Up Call    Care Transition Nurse contacted the patient by telephone to follow up after admission on . Verified name and  with patient as identifiers. Patient: Jenny Wilson  Patient : 1940   MRN: [id number]  Reason for Admission: htn urgency  Discharge Date: 11/15/22 RARS: Readmission Risk Score: 13.6      Needs to be reviewed by the provider   Additional needs identified to be addressed with provider: No  none             Method of communication with provider: none. below    Addressed changes since last contact:  medications-recent medication changes again after continued hypertension  Discussed follow-up appointments. If no appointment was previously scheduled, appointment scheduling offered: Yes. Is follow up appointment scheduled within 7 days of discharge? Yes. Follow Up  Future Appointments   Date Time Provider Abimael Nicolas   2022  3:30 PM Arden Rhodes MD [facility] AMB   2023  1:45 PM MD NOHEMY Seals AMB   2023  1:30 PM MD NOHEMY Seals [facility] AMB     Non-[facility] follow up appointment(s):     Care Transition Nurse reviewed discharge instructions, medical action plan, and red flags with patient and discussed any barriers to care and/or understanding of plan of care after discharge. Discussed appropriate site of care based on symptoms and resources available to patient including: PCP  Specialist  Home health  CTN . The patient agrees to contact the PCP office for questions related to their healthcare. Advance Care Planning:   reviewed and current.      Patients top risk factors for readmission: medical condition-NSTEMI, UTI, htn urgency  Interventions to address risk factors: Scheduled appointment with Specialist-follow up again  and Obtained and reviewed discharge summary and/or continuity of care documents    Offered patient enrollment in the Remote Patient Monitoring (RPM) program for in-home monitoring: NA.     Care Transitions Subsequent and Final Call    Subsequent and Final Calls  Care Transitions Interventions    Physical Therapy: Completed    Disease Specific Clinic: Completed Occupational Therapy: Completed     Disease Association: Completed      Other Interventions:             Care Transition Nurse provided contact information for future needs. Plan for follow-up call in 10-14 days based on severity of symptoms and risk factors.   Plan for next call: symptom management-assess blood pressure logs with recent medication changes    Carlos Villegas RN

## 2022-12-14 PROBLEM — R77.8 ELEVATED TROPONIN: Status: RESOLVED | Noted: 2022-11-12 | Resolved: 2022-12-14

## 2022-12-14 PROBLEM — R79.89 ELEVATED TROPONIN: Status: RESOLVED | Noted: 2022-11-12 | Resolved: 2022-12-14

## 2022-12-19 ENCOUNTER — CARE COORDINATION (OUTPATIENT)
Dept: CARE COORDINATION | Facility: CLINIC | Age: 82
End: 2022-12-19

## 2022-12-19 NOTE — CARE COORDINATION
Patient has graduated from the Care Transitions program on 12/19. Patient/family has the ability to self-manage at this time. Patient has no further care management needs, no referral to the [facility] team for further management. Patient has Care Transition Nurse's contact information for any further questions, concerns, or needs.   Patients upcoming visits:    Future Appointments   Date Time Provider Abimael Nicolas   12/29/2022  3:30 PM Rosalinda Carranza MD [facility] AMB   2/22/2023  1:45 PM [facility]   5/1/2023  1:30 PM Tiffanie Horne MD [facility] AMB

## 2022-12-28 NOTE — PROGRESS NOTES
[facility] CARDIOLOGY  [address], [address]  PHONE: [phone]      22    NAME:  Dayami Fang  : 1940  MRN: [id number]         SUBJECTIVE:   Dayami Fang is a 80 y.o. female seen for a follow up visit regarding the following:     Chief Complaint   Patient presents with    Follow-up     4 weeks    Hypertension              HPI:  Follow up  Follow-up (4 weeks) and Hypertension   . Follow up supravalvular aortic stenosis with plans for intervention only for severe symptoms. Patient with severe  back pain/spinal stenosis, rheumatoid arthritis. Severe osteoporosis, GI bleed, weight loss, debility we have recommended conservative management and serial evaluation. Flash pulmonary edema due to uncontrolled hypertension in turn d/t uncontrolled arthritis pain. She is now taking her oxycodone daily with improved pain control. She has an elegant BP diary from home. When the [facility] nurse, PT or OT manually check her BP at home, it has been 140/60's. When she would check with her old BP machine she would get 160's. On at least one occasion the OT checked both manually and with patient's home machine (once she got a new one) and saw good correlation. However, here today checked twice by my nurse and again by myself, her home monitor is consistently reading 49-68 points systolic lower than her home machine is recording. That said, she's felt well except about 10 days ago started itching on her left shoulder blade and arm and developed a rash which I looked at today and is consistent with Shingles. She is immunocompromised with RA and has not had her shingles vaccines. She has no angina, dyspnea or edema. Past cardiac history:   Echo 12: moderate AI, normal chamber sizes and EF   Aug 2013: mild to moderate AI, normal LV size and EF, grade I diastolic function.  Subaortic membrane noted in some views, peak gradient 30, not hemodynamically significant. Sep 2014 - EF 60-65%, mild AS, peak 27, mean 16. Mild AI, no subaortic membrane, RVSP 32   Sep 2015 - EF 60-65%, moderate AI, mean gradient 20, peak gradient 40, again the valve itself appears normal, now the reader questions supraortic stenosis   Cardiac MRI - narrowing of the caudal aspect of the ascending aorta, just cephalad to the valve, resulting in mild stenosis. Moderate regurgitation. No abnormality of the valve itself and no post stenotic dilatation. Normal LV size and EF 81%   Sep 2016 - EF 68%, mild AI, AV sclerosis, no stenosis   Sep 2017- EF 67%, mild AV sclerosis/stenosis, mean gradient 12, COY 1.3   Oct 2018 - normal SF and DF, normal size, moderate AI, supravalvular gradient 22, RVSP 45   Oct 2019 normal SF and DF, moderate AI, peak supravalvular gradient 32   Sep 2020- normal SF, abnormal DF peak gradient 28, bulky calcium at sinotubular junction, consider TAVR CTA if it would alter clinical management   Sep 2021- EF 55-60%, impaired relaxation, mean AV gradient 13, mild AS/Mild AI, RVSP 34  Nov 2022       [facility] - normal coronary arteries. 2-3+ AI with supravalvular AS, complicated by small distal radial artery PSA, no intervention required, gradient < 10 mmHg       Limited echo - EF 60-65%             Key CAD CHF Meds            hydrALAZINE (APRESOLINE) 100 MG tablet (Taking)    Sig - Route: Take 1 tablet by mouth 2 times daily - Oral    furosemide (LASIX) 20 MG tablet (Taking)    Sig - Route: Take 1 tablet by mouth daily TAKE 1 TABLET BY MOUTH EVERY DAY prn leg swelling - Oral    carvedilol (COREG) 12.5 MG tablet (Taking)    Sig - Route: Take 1 tablet by mouth 2 times daily (with meals) - Oral    ezetimibe (ZETIA) 10 MG tablet (Taking)    Sig - Route: Take 1 tablet by mouth in the morning. TAKE 1 TABLET BY MOUTH EVERY DAY. - Oral    colestipol (COLESTID) 1 g tablet (Taking)    Sig - Route:  Take 1 tablet by mouth daily TAKE 1 TABLET BY MOUTH EVERY DAY - Oral    niacin (NIASPAN) 500 MG extended release tablet (Taking)    Class: Historical Med          Key Antihyperglycemic Medications       Patient is on no antihyperglycemic meds. Past Medical History, Past Surgical History, Family history, Social History, and Medications were all reviewed with the patient today and updated as necessary. Prior to Admission medications    Medication Sig Start Date End Date Taking? Authorizing Provider   aspirin 81 MG EC tablet Take 81 mg by mouth daily   Yes Historical Provider, MD   valACYclovir (VALTREX) 1 g tablet Take 1 tablet by mouth 3 times daily for 7 days 12/29/22 1/5/23 Yes Nguyễn Calzada MD   hydrALAZINE (APRESOLINE) 100 MG tablet Take 1 tablet by mouth 2 times daily 12/29/22  Yes Nguyễn Calzada MD   furosemide (LASIX) 20 MG tablet Take 1 tablet by mouth daily TAKE 1 TABLET BY MOUTH EVERY DAY prn leg swelling 11/30/22  Yes Nguyễn Calzada MD   carvedilol (COREG) 12.5 MG tablet Take 1 tablet by mouth 2 times daily (with meals) 11/30/22  Yes Nguyễn Calzada MD   pregabalin (LYRICA) 100 MG capsule  10/31/22  Yes Historical Provider, MD   naloxone 4 MG/0.1ML LIQD nasal spray 4 mg once as needed 7/12/22  Yes Historical Provider, MD   Coenzyme Q10 (COQ10 PO) daily 1/3/08  Yes Historical Provider, MD   methotrexate (RHEUMATREX) 2.5 MG chemo tablet 7.5 mg once a week Take 3 2.5 mg tablets every Friday 10/12/22  Yes Historical Provider, MD   oxyCODONE (ROXICODONE) 5 MG immediate release tablet TAKE 1/2 TO 1 (ONE-HALF TO ONE) TABLET BY MOUTH ONCE DAILY AS NEEDED FOR SEVERE EPISODE PAIN 10/19/22  Yes Historical Provider, MD   pantoprazole (PROTONIX) 40 MG tablet TAKE 1 TABLET BY MOUTH EVERY DAY 9/7/22  Yes Loco Alvarenga MD   ezetimibe (ZETIA) 10 MG tablet Take 1 tablet by mouth in the morning.  TAKE 1 TABLET BY MOUTH EVERY DAY. 8/10/22  Yes Loco Alvarenga MD   calcium citrate-vitamin d 250-200 MG-UNIT TABS Take by mouth daily 2/4/10  Yes Historical Provider, MD   colestipol (COLESTID) 1 g tablet Take 1 tablet by mouth daily TAKE 1 TABLET BY MOUTH EVERY DAY 5/26/22  Yes Jeana Martínez MD   UNABLE TO FIND 2 times daily Fish oil /Omega 3 vegan liquid gel caps. Yes Historical Provider, MD   magnesium (MAGNESIUM-OXIDE) 250 MG TABS tablet Take 250 mg by mouth Daily with supper   Yes Historical Provider, MD   UNABLE TO FIND daily Apple Pectin 500 mg take with niacin   Yes Historical Provider, MD   Menaquinone-7 (VITAMIN K2 PO) Take by mouth   Yes Ar Automatic Reconciliation   ACETAMINOPHEN ER PO Take 1,000 mg by mouth 3 times daily (with meals)   Yes Ar Automatic Reconciliation   ascorbic acid (VITAMIN C) 500 MG tablet Take 500 mg by mouth 2 times daily 2/4/10  Yes Ar Automatic Reconciliation   Cholecalciferol 50 MCG (2000 UT) TABS Take 6,000 Units by mouth daily   Yes Ar Automatic Reconciliation   famotidine (PEPCID) 40 MG tablet TAKE 1 TABLET BY MOUTH EVERY DAY AT NIGHT 3/15/22  Yes Ar Automatic Reconciliation   folic acid (FOLVITE) 1 MG tablet Takes 5 tabs qd 7/7/15  Yes Ar Automatic Reconciliation   niacin (NIASPAN) 500 MG extended release tablet Take 500 mg by mouth daily 11/23/21  Yes Ar Automatic Reconciliation   oxybutynin (DITROPAN-XL) 10 MG extended release tablet 10 mg daily 12/30/19  Yes Ar Automatic Reconciliation     Allergies   Allergen Reactions    Belladonna Alkaloids Other (See Comments)    Fluvastatin Sodium Other (See Comments)    Amoxicillin Other (See Comments)     Causes increased methotrexate levels    Azithromycin Diarrhea    Benazepril      Unknown to pt       Celecoxib Other (See Comments)     GI upset.   As of 1/6/2021 pt states she does not have a reaction to this and takes Celebrex    Cyclobenzaprine Other (See Comments)    Ergotamine Other (See Comments)     Abdominal pain     Fluvastatin Other (See Comments)    Ibandronic Acid Other (See Comments)     Severe bone pain    Iohexol Other (See Comments)     Pt states she has never had and does not want.     Lactose Other (See Comments)    Lotrel [Amlodipine Besy-Benazepril Hcl]      Headache, rash       Other Myalgia and Other (See Comments)    Penbritin-S [Ampicillin]      diarrhea      Phenobarbital Other (See Comments)    Pravastatin Other (See Comments)    Risedronate Other (See Comments)     Severe bone pain all over    Rosuvastatin Other (See Comments)    Shellfish Allergy Diarrhea    Simvastatin Other (See Comments)     Also Lescol, Zorcor, Pravachol and Crestor  All cause muscle pain     Sulfa Antibiotics Itching and Other (See Comments)     Patient states \"tingling in hands and feet\"    Sulfisoxazole Other (See Comments)     Tingling in fingers    Tetracycline Diarrhea and Other (See Comments)     Abdominal pain    Tramadol Hcl Dizziness or Vertigo    Trospium Other (See Comments)    Adhesive Tape Rash and Other (See Comments)     Surgical tape and tegaderm     Amlodipine Rash     Past Medical History:   Diagnosis Date    Aortic regurgitation 9/9/2015    Cho done 9/1/2015     Aortic valve stenosis, mild 10/7/2015    Chest pain 1/20/2016    CHF (congestive heart failure) ([address] 2004    Chronic pain     Diverticulosis of colon (without mention of hemorrhage) 09/30/2014    Noted on colonoscopy 2010, no sx, Viktor    Essential hypertension, benign 09/30/2014    Facet arthropathy, lumbar     Family history of colonic polyps 12/28/2015    GERD (gastroesophageal reflux disease)     Hearing loss 2011    Hip fracture ([address] 08/2021    Impaired fasting glucose 09/30/2014    Lactose intolerance in adult 09/30/2014    Malaise and fatigue 1/20/2016    Mild intermittent asthma 09/30/2014    MVP (mitral valve prolapse)     Osteoarthritis     Pure hypercholesterolemia 9/30/2014    Rheumatoid arthritis involving multiple joints ([address] 09/30/2014    Dr. Joe Holcomb for colon cancer 12/28/2015    Senile osteoporosis 9/30/2014    Spinal stenosis of lumbar region     Urinary incontinence 09/30/2014     Past Surgical History:   Procedure Laterality Date    CARDIAC PROCEDURE N/A 11/14/2022    LEFT HEART CATH / CORONARY ANGIOGRAPHY performed by Adriane Guillermo MD at [facility] CARDIAC CATH LAB    CATARACT REMOVAL Right     9/8/2011    CHOLECYSTECTOMY  07/08/2011    COLONOSCOPY  07/2020    . No more colonoscopies due to age. HIP FRACTURE SURGERY  08/16/2021    HYSTERECTOMY (CERVIX STATUS UNKNOWN)  01/28/1986    total abdominal    OTHER SURGICAL HISTORY      cauterization of a vessle in nose 2018     Family History   Problem Relation Age of Onset    High Cholesterol Mother     Hypertension Mother     Rheum Arthritis Mother     Anemia Mother     Hearing Loss Mother     Heart Disease Mother     Osteoporosis Mother     Diabetes Father     Colon Polyps Other     Parkinson's Disease Other     Hypertension Other     High Cholesterol Other     Heart Disease Other     Diabetes Other      Social History     Tobacco Use    Smoking status: Never    Smokeless tobacco: Never   Substance Use Topics    Alcohol use: No     Alcohol/week: 0.0 standard drinks       ROS:    Review of Systems   Cardiovascular:  Negative for chest pain and leg swelling. Respiratory:  Negative for shortness of breath. Skin:  Positive for itching and rash. PHYSICAL EXAM:   BP (!) 180/62   Pulse 64   Ht 4' 11\" (1.499 m)   Wt 108 lb 6.4 oz (49.2 kg) Comment: with shoes  BMI 21.89 kg/m²      Wt Readings from Last 3 Encounters:   12/29/22 108 lb 6.4 oz (49.2 kg)   11/30/22 105 lb (47.6 kg)   11/17/22 110 lb (49.9 kg)     BP Readings from Last 3 Encounters:   12/29/22 (!) 180/62   11/30/22 (!) 196/70   11/17/22 (!) 170/70     Pulse Readings from Last 3 Encounters:   12/29/22 64   11/30/22 84   11/17/22 82       Physical Exam  Constitutional:       Appearance: Normal appearance. HENT:      Head: Normocephalic and atraumatic. Eyes:      Conjunctiva/sclera: Conjunctivae normal.   Neck:      Vascular: No carotid bruit. Cardiovascular:      Rate and Rhythm: Normal rate and regular rhythm. Heart sounds: Murmur heard. Harsh midsystolic murmur is present with a grade of 3/6 at the upper right sternal border radiating to the neck. No friction rub. No gallop. Pulmonary:      Effort: No respiratory distress. Breath sounds: No wheezing or rales. Abdominal:      General: Abdomen is flat. There is no distension. Palpations: Abdomen is soft. Tenderness: There is no abdominal tenderness. Musculoskeletal:         General: No swelling. Cervical back: Neck supple. Skin:     General: Skin is warm and dry. Comments: Maculopapular rash along left tricep in dermatomal pattern   Neurological:      General: No focal deficit present. Mental Status: She is alert. Psychiatric:         Mood and Affect: Mood normal.         Behavior: Behavior normal.       Medical problems and test results were reviewed with the patient today.      DATA REVIEW    LIPID PANEL     Lab Results   Component Value Date    CHOL 194 11/01/2022    CHOL 178 05/26/2022    CHOL 180 05/06/2021     Lab Results   Component Value Date    TRIG 88 11/01/2022    TRIG 77 05/26/2022    TRIG 51 05/06/2021     Lab Results   Component Value Date    HDL 93 (H) 11/01/2022    HDL 93 (H) 05/26/2022    HDL 92 05/06/2021     Lab Results   Component Value Date    LDLCALC 83.4 11/01/2022    LDLCALC 69.6 05/26/2022    LDLCALC 78 05/06/2021     Lab Results   Component Value Date    LABVLDL 17.6 11/01/2022    LABVLDL 15.4 05/26/2022    LABVLDL 12 07/24/2020    VLDL 10 05/06/2021     Lab Results   Component Value Date    CHOLHDLRATIO 2.1 11/01/2022    CHOLHDLRATIO 1.9 05/26/2022       BMP  Lab Results   Component Value Date/Time     11/15/2022 08:43 AM    K 3.5 11/15/2022 08:43 AM     11/15/2022 08:43 AM    CO2 29 11/15/2022 08:43 AM    BUN 14 11/15/2022 08:43 AM    CREATININE 0.70 11/15/2022 08:43 AM    GLUCOSE 106 11/15/2022 08:43 AM CALCIUM 9.3 11/15/2022 08:43 AM       12/8/22 1325    WBC 3.5 - 10.8 K/uL 3.9    RBC 3.86 - 5.35 M/uL 3.19 Low     Hemoglobin 11.0 - 15.4 g/dL 10.6 Low     Hematocrit 35.6 - 47.3 % 31.6 Low     MCV 79.0 - 100.0 fL 99.0    MCH 23.7 - 32.9 pg 33.1 High     MCHC 30.0 - 36.5 g/dL 33.4    RDW-CV 11.6 - 16.0 % 18.1 High     Platelets 397 - 713 K/uL 112 Low        12/8/22 1325    Ferritin 5.0 - 204.0 ng/mL 72.6       12/8/22 1325    Iron 50 - 170 ug/dL 45 Low     TIBC 250 - 450 ug/dL 288    Transferrin 180 - 365 mg/dL 230    Transferrin % Saturation 20 - 50 % 14 Low        12/8/22 1325    Sodium 136 - 145 mmol/L 136    Potassium 3.5 - 5.1 mmol/L 3.5    Chloride 98 - 107 mmol/L 102    CO2 20 - 30 mmol/L 27    BUN 7 - 20 mg/dL 26 High     Calcium 8.5 - 10.4 mg/dL 9.3    Creatinine 0.57 - 1.11 mg/dL 0.97    Glucose 70 - 99 mg/dL 170 High     Anion Gap 6 - 16 mmol/L 11    eGFR >59 mL/min/1.73 m2 58 Low         EKG        CXR/IMAGING        DEVICE INTERROGATION        OUTSIDE RECORDS REVIEW    Records from outside providers have been reviewed and summarized as noted in the HPI, past history and data review sections of this note, and reviewed with patient. .       ASSESSMENT and PLAN    [place] was seen today for follow-up and hypertension. Diagnoses and all orders for this visit:    Nonrheumatic aortic valve disorder    Essential hypertension, benign    Aortic valve insufficiency, acquired    Herpes zoster without complication    Other orders  -     valACYclovir (VALTREX) 1 g tablet; Take 1 tablet by mouth 3 times daily for 7 days  -     hydrALAZINE (APRESOLINE) 100 MG tablet; Take 1 tablet by mouth 2 times daily        IMPRESSION:    BP is better at home than here but is still not controlled. Home machine is about 30-40 points off from manual check. Her home readings have been 120-130 most of the time, meaning then her BP is ~ 150-160 most of the time. Diastolic well controlled.   Increase hydralazine to 100 mg TID and return for BP check in office in 4-6 weeks. On max tolerated BB d/t bradycardia. If still consistently > 150/90 will add low dose amlodipine    She has Shingles. Because she is immunocompromised will treat despite being > 72 hours. Will send message to her PCP to help keep an eye out on that and advised patient seek urgent help if worse. Once clear for appropriate length of time should consider shingles vaccine, defer to PCP or rheumatologist    Stable valve disease. Conservative therapy        Return in about 3 months (around 3/29/2023) for 50 VISIT 4-6 WEEKS FOR BP CHECK. Thank you for allowing me to participate in this patient's care. Please call or contact me if there are any questions or concerns regarding the above.       Federico Fabian MD  12/29/22  4:06 PM

## 2022-12-29 ENCOUNTER — OFFICE VISIT (OUTPATIENT)
Dept: CARDIOLOGY CLINIC | Age: 82
End: 2022-12-29
Payer: MEDICARE

## 2022-12-29 VITALS
HEIGHT: 59 IN | HEART RATE: 64 BPM | WEIGHT: 108.4 LBS | DIASTOLIC BLOOD PRESSURE: 62 MMHG | BODY MASS INDEX: 21.85 KG/M2 | SYSTOLIC BLOOD PRESSURE: 180 MMHG

## 2022-12-29 DIAGNOSIS — I35.1 AORTIC VALVE INSUFFICIENCY, ACQUIRED: ICD-10-CM

## 2022-12-29 DIAGNOSIS — B02.9 HERPES ZOSTER WITHOUT COMPLICATION: ICD-10-CM

## 2022-12-29 DIAGNOSIS — I35.9 NONRHEUMATIC AORTIC VALVE DISORDER: Primary | ICD-10-CM

## 2022-12-29 DIAGNOSIS — I10 ESSENTIAL HYPERTENSION, BENIGN: ICD-10-CM

## 2022-12-29 PROCEDURE — G8400 PT W/DXA NO RESULTS DOC: HCPCS | Performed by: INTERNAL MEDICINE

## 2022-12-29 PROCEDURE — 3074F SYST BP LT 130 MM HG: CPT | Performed by: INTERNAL MEDICINE

## 2022-12-29 PROCEDURE — G8420 CALC BMI NORM PARAMETERS: HCPCS | Performed by: INTERNAL MEDICINE

## 2022-12-29 PROCEDURE — 1090F PRES/ABSN URINE INCON ASSESS: CPT | Performed by: INTERNAL MEDICINE

## 2022-12-29 PROCEDURE — 99214 OFFICE O/P EST MOD 30 MIN: CPT | Performed by: INTERNAL MEDICINE

## 2022-12-29 PROCEDURE — 1123F ACP DISCUSS/DSCN MKR DOCD: CPT | Performed by: INTERNAL MEDICINE

## 2022-12-29 PROCEDURE — G8427 DOCREV CUR MEDS BY ELIG CLIN: HCPCS | Performed by: INTERNAL MEDICINE

## 2022-12-29 PROCEDURE — 3078F DIAST BP <80 MM HG: CPT | Performed by: INTERNAL MEDICINE

## 2022-12-29 PROCEDURE — 1036F TOBACCO NON-USER: CPT | Performed by: INTERNAL MEDICINE

## 2022-12-29 PROCEDURE — G8484 FLU IMMUNIZE NO ADMIN: HCPCS | Performed by: INTERNAL MEDICINE

## 2022-12-29 RX ORDER — ASPIRIN 81 MG/1
81 TABLET ORAL DAILY
COMMUNITY

## 2022-12-29 RX ORDER — VALACYCLOVIR HYDROCHLORIDE 1 G/1
1000 TABLET, FILM COATED ORAL 3 TIMES DAILY
Qty: 21 TABLET | Refills: 0 | Status: SHIPPED | OUTPATIENT
Start: 2022-12-29 | End: 2023-01-05

## 2022-12-29 RX ORDER — HYDRALAZINE HYDROCHLORIDE 100 MG/1
100 TABLET, FILM COATED ORAL 2 TIMES DAILY
Qty: 270 TABLET | Refills: 3 | Status: SHIPPED | OUTPATIENT
Start: 2022-12-29

## 2022-12-29 ASSESSMENT — ENCOUNTER SYMPTOMS: SHORTNESS OF BREATH: 0

## 2023-02-01 ENCOUNTER — TELEPHONE (OUTPATIENT)
Dept: CARDIOLOGY CLINIC | Age: 83
End: 2023-02-01

## 2023-02-02 ENCOUNTER — NURSE ONLY (OUTPATIENT)
Dept: CARDIOLOGY CLINIC | Age: 83
End: 2023-02-02

## 2023-02-02 VITALS
WEIGHT: 108 LBS | DIASTOLIC BLOOD PRESSURE: 56 MMHG | HEIGHT: 59 IN | SYSTOLIC BLOOD PRESSURE: 156 MMHG | HEART RATE: 68 BPM | BODY MASS INDEX: 21.77 KG/M2

## 2023-02-02 DIAGNOSIS — I10 ESSENTIAL HYPERTENSION, BENIGN: Primary | ICD-10-CM

## 2023-02-02 NOTE — PROGRESS NOTES
Pt came in for bp check per Dr. Chito Mcnulty. Pt's bp today was 176/52 when she first walked in & 156/56 after 5 mins. Pt states that she believes it to be up bc she is in pain due to not taking oxycodone bc she drove herself to appt. Pt brought a list of bp readings from [facility] nurse. Report to be given to Dr. Chito Mcnulty on 2/3/23 when she is in office & pt will be called with directions. Pt was in understanding of this. Patient

## 2023-02-03 ENCOUNTER — TELEPHONE (OUTPATIENT)
Dept: CARDIOLOGY CLINIC | Age: 83
End: 2023-02-03

## 2023-02-03 NOTE — TELEPHONE ENCOUNTER
Spoke with Dr. Frannie Levi concerning pt's nurse visit 2/2/23 for bp check. Called pt & notified of Dr. Kellie Munoz instructions to continue medications as is \"continue the course\" with concentration on lowering issue of pain & trying to keep UTIs resolved. Pt voiced understanding.

## 2023-02-22 ENCOUNTER — OFFICE VISIT (OUTPATIENT)
Dept: FAMILY MEDICINE CLINIC | Facility: CLINIC | Age: 83
End: 2023-02-22
Payer: MEDICARE

## 2023-02-22 VITALS
BODY MASS INDEX: 20.56 KG/M2 | TEMPERATURE: 97.8 F | DIASTOLIC BLOOD PRESSURE: 60 MMHG | OXYGEN SATURATION: 98 % | HEART RATE: 58 BPM | WEIGHT: 102 LBS | HEIGHT: 59 IN | SYSTOLIC BLOOD PRESSURE: 138 MMHG

## 2023-02-22 DIAGNOSIS — I27.21 SECONDARY PULMONARY ARTERIAL HYPERTENSION (HCC): ICD-10-CM

## 2023-02-22 DIAGNOSIS — R11.0 NAUSEA: Primary | ICD-10-CM

## 2023-02-22 DIAGNOSIS — I72.1 ANEURYSM OF ARTERY OF UPPER EXTREMITY (HCC): ICD-10-CM

## 2023-02-22 DIAGNOSIS — R73.01 IMPAIRED FASTING GLUCOSE: ICD-10-CM

## 2023-02-22 DIAGNOSIS — I35.9 NONRHEUMATIC AORTIC VALVE DISORDER: ICD-10-CM

## 2023-02-22 DIAGNOSIS — M05.9 RHEUMATOID ARTHRITIS WITH POSITIVE RHEUMATOID FACTOR, INVOLVING UNSPECIFIED SITE (HCC): ICD-10-CM

## 2023-02-22 DIAGNOSIS — H61.23 HEARING LOSS DUE TO CERUMEN IMPACTION, BILATERAL: ICD-10-CM

## 2023-02-22 DIAGNOSIS — I10 UNCONTROLLED HYPERTENSION: ICD-10-CM

## 2023-02-22 LAB
ALBUMIN SERPL-MCNC: 3.7 G/DL (ref 3.2–4.6)
ALBUMIN/GLOB SERPL: 1.1 (ref 0.4–1.6)
ALP SERPL-CCNC: 74 U/L (ref 50–136)
ALT SERPL-CCNC: 22 U/L (ref 12–65)
ANION GAP SERPL CALC-SCNC: 5 MMOL/L (ref 2–11)
AST SERPL-CCNC: 27 U/L (ref 15–37)
BASOPHILS # BLD: 0 K/UL (ref 0–0.2)
BASOPHILS NFR BLD: 1 % (ref 0–2)
BILIRUB SERPL-MCNC: 0.5 MG/DL (ref 0.2–1.1)
BUN SERPL-MCNC: 31 MG/DL (ref 8–23)
CALCIUM SERPL-MCNC: 10.1 MG/DL (ref 8.3–10.4)
CHLORIDE SERPL-SCNC: 102 MMOL/L (ref 101–110)
CHOLEST SERPL-MCNC: 188 MG/DL
CO2 SERPL-SCNC: 31 MMOL/L (ref 21–32)
CREAT SERPL-MCNC: 1.2 MG/DL (ref 0.6–1)
DIFFERENTIAL METHOD BLD: ABNORMAL
EOSINOPHIL # BLD: 0.1 K/UL (ref 0–0.8)
EOSINOPHIL NFR BLD: 2 % (ref 0.5–7.8)
ERYTHROCYTE [DISTWIDTH] IN BLOOD BY AUTOMATED COUNT: 17.2 % (ref 11.9–14.6)
GLOBULIN SER CALC-MCNC: 3.5 G/DL (ref 2.8–4.5)
GLUCOSE SERPL-MCNC: 124 MG/DL (ref 65–100)
HCT VFR BLD AUTO: 36.3 % (ref 35.8–46.3)
HDLC SERPL-MCNC: 88 MG/DL (ref 40–60)
HDLC SERPL: 2.1
HGB BLD-MCNC: 11.9 G/DL (ref 11.7–15.4)
IMM GRANULOCYTES # BLD AUTO: 0 K/UL (ref 0–0.5)
IMM GRANULOCYTES NFR BLD AUTO: 0 % (ref 0–5)
LDLC SERPL CALC-MCNC: 83.6 MG/DL
LYMPHOCYTES # BLD: 1.3 K/UL (ref 0.5–4.6)
LYMPHOCYTES NFR BLD: 30 % (ref 13–44)
MCH RBC QN AUTO: 33.8 PG (ref 26.1–32.9)
MCHC RBC AUTO-ENTMCNC: 32.8 G/DL (ref 31.4–35)
MCV RBC AUTO: 103.1 FL (ref 82–102)
MONOCYTES # BLD: 0.6 K/UL (ref 0.1–1.3)
MONOCYTES NFR BLD: 13 % (ref 4–12)
NEUTS SEG # BLD: 2.5 K/UL (ref 1.7–8.2)
NEUTS SEG NFR BLD: 54 % (ref 43–78)
NRBC # BLD: 0 K/UL (ref 0–0.2)
PLATELET # BLD AUTO: 104 K/UL (ref 150–450)
PMV BLD AUTO: 13 FL (ref 9.4–12.3)
POTASSIUM SERPL-SCNC: 3.8 MMOL/L (ref 3.5–5.1)
PROT SERPL-MCNC: 7.2 G/DL (ref 6.3–8.2)
RBC # BLD AUTO: 3.52 M/UL (ref 4.05–5.2)
SODIUM SERPL-SCNC: 138 MMOL/L (ref 133–143)
TRIGL SERPL-MCNC: 82 MG/DL (ref 35–150)
TSH, 3RD GENERATION: 1.58 UIU/ML (ref 0.36–3.74)
VLDLC SERPL CALC-MCNC: 16.4 MG/DL (ref 6–23)
WBC # BLD AUTO: 4.5 K/UL (ref 4.3–11.1)

## 2023-02-22 PROCEDURE — 3075F SYST BP GE 130 - 139MM HG: CPT | Performed by: FAMILY MEDICINE

## 2023-02-22 PROCEDURE — 1090F PRES/ABSN URINE INCON ASSESS: CPT | Performed by: FAMILY MEDICINE

## 2023-02-22 PROCEDURE — 1123F ACP DISCUSS/DSCN MKR DOCD: CPT | Performed by: FAMILY MEDICINE

## 2023-02-22 PROCEDURE — G8484 FLU IMMUNIZE NO ADMIN: HCPCS | Performed by: FAMILY MEDICINE

## 2023-02-22 PROCEDURE — 3078F DIAST BP <80 MM HG: CPT | Performed by: FAMILY MEDICINE

## 2023-02-22 PROCEDURE — 99214 OFFICE O/P EST MOD 30 MIN: CPT | Performed by: FAMILY MEDICINE

## 2023-02-22 PROCEDURE — 1036F TOBACCO NON-USER: CPT | Performed by: FAMILY MEDICINE

## 2023-02-22 PROCEDURE — G8420 CALC BMI NORM PARAMETERS: HCPCS | Performed by: FAMILY MEDICINE

## 2023-02-22 PROCEDURE — G8400 PT W/DXA NO RESULTS DOC: HCPCS | Performed by: FAMILY MEDICINE

## 2023-02-22 PROCEDURE — G8427 DOCREV CUR MEDS BY ELIG CLIN: HCPCS | Performed by: FAMILY MEDICINE

## 2023-02-22 RX ORDER — PANTOPRAZOLE SODIUM 40 MG/1
TABLET, DELAYED RELEASE ORAL
Qty: 180 TABLET | Refills: 3 | Status: SHIPPED | OUTPATIENT
Start: 2023-02-22

## 2023-02-22 RX ORDER — MONTELUKAST SODIUM 4 MG/1
1 TABLET, CHEWABLE ORAL DAILY
Qty: 60 TABLET | Refills: 3 | Status: SHIPPED | OUTPATIENT
Start: 2023-02-22

## 2023-02-22 RX ORDER — FAMOTIDINE 40 MG/1
40 TABLET, FILM COATED ORAL NIGHTLY
Qty: 90 TABLET | Refills: 1 | Status: SHIPPED | OUTPATIENT
Start: 2023-02-22

## 2023-02-22 RX ORDER — ESTRADIOL 0.1 MG/G
CREAM VAGINAL
COMMUNITY
Start: 2023-01-09

## 2023-02-22 RX ORDER — CIPROFLOXACIN 500 MG/1
TABLET, FILM COATED ORAL
COMMUNITY
Start: 2023-01-09 | End: 2023-02-22

## 2023-02-22 RX ORDER — EZETIMIBE 10 MG/1
10 TABLET ORAL DAILY
Qty: 90 TABLET | Refills: 1 | Status: SHIPPED | OUTPATIENT
Start: 2023-02-22

## 2023-02-22 RX ORDER — LATANOPROST 50 UG/ML
SOLUTION/ DROPS OPHTHALMIC
COMMUNITY
Start: 2023-02-16

## 2023-02-22 RX ORDER — NIACIN 500 MG/1
500 TABLET, EXTENDED RELEASE ORAL DAILY
Qty: 90 TABLET | Refills: 1 | Status: CANCELLED | OUTPATIENT
Start: 2023-02-22

## 2023-02-22 SDOH — ECONOMIC STABILITY: FOOD INSECURITY: WITHIN THE PAST 12 MONTHS, YOU WORRIED THAT YOUR FOOD WOULD RUN OUT BEFORE YOU GOT MONEY TO BUY MORE.: NEVER TRUE

## 2023-02-22 SDOH — ECONOMIC STABILITY: FOOD INSECURITY: WITHIN THE PAST 12 MONTHS, THE FOOD YOU BOUGHT JUST DIDN'T LAST AND YOU DIDN'T HAVE MONEY TO GET MORE.: NEVER TRUE

## 2023-02-22 SDOH — ECONOMIC STABILITY: TRANSPORTATION INSECURITY
IN THE PAST 12 MONTHS, HAS LACK OF TRANSPORTATION KEPT YOU FROM MEETINGS, WORK, OR FROM GETTING THINGS NEEDED FOR DAILY LIVING?: YES

## 2023-02-22 SDOH — ECONOMIC STABILITY: HOUSING INSECURITY
IN THE LAST 12 MONTHS, WAS THERE A TIME WHEN YOU DID NOT HAVE A STEADY PLACE TO SLEEP OR SLEPT IN A SHELTER (INCLUDING NOW)?: NO

## 2023-02-22 SDOH — ECONOMIC STABILITY: INCOME INSECURITY: HOW HARD IS IT FOR YOU TO PAY FOR THE VERY BASICS LIKE FOOD, HOUSING, MEDICAL CARE, AND HEATING?: NOT VERY HARD

## 2023-02-22 ASSESSMENT — PATIENT HEALTH QUESTIONNAIRE - PHQ9
SUM OF ALL RESPONSES TO PHQ QUESTIONS 1-9: 15
3. TROUBLE FALLING OR STAYING ASLEEP: 3
8. MOVING OR SPEAKING SO SLOWLY THAT OTHER PEOPLE COULD HAVE NOTICED. OR THE OPPOSITE, BEING SO FIGETY OR RESTLESS THAT YOU HAVE BEEN MOVING AROUND A LOT MORE THAN USUAL: 0
9. THOUGHTS THAT YOU WOULD BE BETTER OFF DEAD, OR OF HURTING YOURSELF: 0
SUM OF ALL RESPONSES TO PHQ QUESTIONS 1-9: 15
1. LITTLE INTEREST OR PLEASURE IN DOING THINGS: 3
5. POOR APPETITE OR OVEREATING: 3
4. FEELING TIRED OR HAVING LITTLE ENERGY: 3
SUM OF ALL RESPONSES TO PHQ QUESTIONS 1-9: 15
SUM OF ALL RESPONSES TO PHQ QUESTIONS 1-9: 15
2. FEELING DOWN, DEPRESSED OR HOPELESS: 3
10. IF YOU CHECKED OFF ANY PROBLEMS, HOW DIFFICULT HAVE THESE PROBLEMS MADE IT FOR YOU TO DO YOUR WORK, TAKE CARE OF THINGS AT HOME, OR GET ALONG WITH OTHER PEOPLE: 2
7. TROUBLE CONCENTRATING ON THINGS, SUCH AS READING THE NEWSPAPER OR WATCHING TELEVISION: 0
6. FEELING BAD ABOUT YOURSELF - OR THAT YOU ARE A FAILURE OR HAVE LET YOURSELF OR YOUR FAMILY DOWN: 0
SUM OF ALL RESPONSES TO PHQ9 QUESTIONS 1 & 2: 6

## 2023-02-22 NOTE — PATIENT INSTRUCTIONS
Please use debrox over the counter drops in your ears once a week, and we will check your ears again next visit

## 2023-02-22 NOTE — PROGRESS NOTES
Pierce Christianson (: 1940) is a 80 y.o. female, established patient, here for evaluation of the following chief complaint(s):  Hypertension, Hearing Loss, Medication Problem (Tizanidine and colestipol ), and Heartburn (And nausea/)       ASSESSMENT/PLAN:  1. Nausea  -     CBC with Auto Differential; Future  -     Comprehensive Metabolic Panel; Future  -     Lipid Panel; Future  -     TSH; Future  -     Vitamin D 25 Hydroxy; Future  2. Uncontrolled hypertension  -     CBC with Auto Differential; Future  -     Comprehensive Metabolic Panel; Future  -     Lipid Panel; Future  -     TSH; Future  -     Vitamin D 25 Hydroxy; Future  3. Nonrheumatic aortic valve disorder  -     CBC with Auto Differential; Future  -     Comprehensive Metabolic Panel; Future  -     Lipid Panel; Future  -     TSH; Future  -     Vitamin D 25 Hydroxy; Future  4. Impaired fasting glucose  -     CBC with Auto Differential; Future  -     Comprehensive Metabolic Panel; Future  -     Lipid Panel; Future  -     TSH; Future  -     Vitamin D 25 Hydroxy; Future  5. Rheumatoid arthritis with positive rheumatoid factor, involving unspecified site ([facility] [address]  -     CBC with Auto Differential; Future  -     Comprehensive Metabolic Panel; Future  -     Lipid Panel; Future  -     TSH; Future  -     Vitamin D 25 Hydroxy; Future  6. Aneurysm of artery of upper extremity ([address]  7. Secondary pulmonary arterial hypertension ([address]  8.  Hearing loss due to cerumen impaction, bilateral    Worsening nausea, history of gastritis in the past, will double Protonix, to take 40 mg in the morning, 40 mg at night, if symptoms have not improved in the next few weeks, will refer to GI,  Blood pressure normotensive on recheck,  Ceruminosis noted, removed with curette and irrigation, patient now hearing much improved  SUBJECTIVE/OBJECTIVE:  HPI  History of uncontrolled hypertension with recent admission a few months ago for hypertensive emergency, flash pulmonary edema,  Seems as by cardiology, amlodipine to be added if blood pressure remains high, patient checks at home as well,  On hydralazine. Blood pressure improved today,  Having some episodes of low pulse,  No symptoms of DTI, recommended against testing for UTI at this time,    Hearing loss severe, due to earwax, much improved after cerumen removed during visit today. Physical Exam  Vitals and nursing note reviewed. Constitutional:       General: She is not in acute distress. Appearance: Normal appearance. She is not ill-appearing. HENT:      Head: Normocephalic and atraumatic. Right Ear: External ear normal.      Left Ear: External ear normal.      Nose: Nose normal.      Mouth/Throat:      Mouth: Mucous membranes are dry. Eyes:      Extraocular Movements: Extraocular movements intact. Pupils: Pupils are equal, round, and reactive to light. Cardiovascular:      Rate and Rhythm: Normal rate. Pulses: Normal pulses. Pulmonary:      Effort: Pulmonary effort is normal.      Breath sounds: Normal breath sounds. Abdominal:      General: There is no distension. Musculoskeletal:         General: Normal range of motion. Cervical back: Normal range of motion and neck supple. Skin:     General: Skin is warm and dry. Comments: Bruising extensively on right arm   Neurological:      General: No focal deficit present. Mental Status: She is alert and oriented to person, place, and time. Psychiatric:         Mood and Affect: Mood normal.         On this date 02/22/23  I have spent 30 minutes reviewing previous notes, test results and face to face with the patient discussing the diagnosis and importance of compliance with the treatment plan as well as documenting on the day of the visit. An electronic signature was used to authenticate this note.   -- Fide Patino MD

## 2023-02-23 ENCOUNTER — TELEPHONE (OUTPATIENT)
Dept: FAMILY MEDICINE CLINIC | Facility: CLINIC | Age: 83
End: 2023-02-23

## 2023-02-23 ENCOUNTER — TELEPHONE (OUTPATIENT)
Dept: CARDIOLOGY CLINIC | Age: 83
End: 2023-02-23

## 2023-02-23 LAB — 25(OH)D3 SERPL-MCNC: 62.1 NG/ML (ref 30–100)

## 2023-02-23 NOTE — TELEPHONE ENCOUNTER
Pt's HHRN states pt's HR averages in the mid 50s, as low as 45. Asymptomatic except lack of energy. States low HR started this week. Taking carvedilol 12.5 mg BID. Please advise.

## 2023-02-23 NOTE — TELEPHONE ENCOUNTER
Jessy Muhammad called with [facility] on behalf of the pt reporting that the HR is 50, /62, no CP or SOB, lack of energy. Ruby Perez

## 2023-02-23 NOTE — TELEPHONE ENCOUNTER
Puja Dobbs MD  [place] [employer]  Caller: Unspecified (Today,  1:56 PM)  Her baseline is 50's. If asymptomatic no changes     Reviewed Dr. Ambrocio Singletary response with [facility]. Verb understanding.

## 2023-02-26 ENCOUNTER — PATIENT MESSAGE (OUTPATIENT)
Dept: FAMILY MEDICINE CLINIC | Facility: CLINIC | Age: 83
End: 2023-02-26

## 2023-02-27 NOTE — TELEPHONE ENCOUNTER
From: Silviano Zarate  To: Dr. Kaylen Jhaveri  Sent: 2/26/2023 1:22 PM EST  Subject: drug interaction between Famotidine and Tizanidine    Dear Dr. Maciej Hunt, I researched what the visiting nurse had warned me about and I found that Famotidine may significantly increase the blood levels and effects of Tizanidine. It may cause blood pressure to fall excessiviely and the risk of other side effects such as drowsiness, dizziness, lightheadedness, fainting and irregular heart rhythm may also increase. I haven't started taking Tizanidine and am wondering if there is an alternative to Famotidine that you could recommend. Thank you. I also would like to know what strength vitamin B12 supplement I should take. Thank you.

## 2023-03-22 RX ORDER — ONDANSETRON HYDROCHLORIDE 8 MG/1
8 TABLET, FILM COATED ORAL EVERY 8 HOURS PRN
Qty: 30 TABLET | Refills: 0 | Status: SHIPPED | OUTPATIENT
Start: 2023-03-22

## 2023-03-23 ENCOUNTER — TELEPHONE (OUTPATIENT)
Dept: CARDIOLOGY CLINIC | Age: 83
End: 2023-03-23

## 2023-03-23 NOTE — TELEPHONE ENCOUNTER
Bonita Earing with [facility] calling to let us know he went to see Mrs. Houser today and her BP was 170/56 at rest. Patient states that she has been under some stress as well. Please call Jose Knox or the patient regarding her blood pressure.

## 2023-03-23 NOTE — TELEPHONE ENCOUNTER
Spoke to pt's Marilyn Benjamin. Recommend pt check BP once daily around lunch time and keep a log. States pt keeps a very detailed log and checks bp multiple times a day. States checking only once may be a problem for pt. Request Jose Knox make pt aware of Dr. Vignesh Teague recommendations, but agree with him that we can't force her to only check daily. Request call back in 1 week with update on pt's BP. Verb understanding.

## 2023-03-28 NOTE — PROGRESS NOTES
[facility] CARDIOLOGY  [address] [facility], [address] [facility] [facility], [address] [place] [address]  PHONE: [phone]      23    NAME:  Sheryl Ohara  : 1940  MRN: [id number]         SUBJECTIVE:   Sheryl Ohara is a 80 y.o. female seen for a follow up visit regarding the following:     Chief Complaint   Patient presents with    Cardiac Valve Problem    3 Month Follow-Up            HPI:  Follow up  Cardiac Valve Problem and 3 Month Follow-Up   . Follow up supravalvular aortic stenosis with plans for intervention only for severe symptoms. Patient with severe  back pain/spinal stenosis, rheumatoid arthritis. Severe osteoporosis, GI bleed, weight loss, debility we have recommended conservative management and serial evaluation. Flash pulmonary edema due to uncontrolled hypertension in turn d/t uncontrolled arthritis pain. Called complaining of severe  nausea, asking for anti-emetic, agreed to short course zofran while getting in touch with her PCP to evaluate for recurrent GI issues. BP continues to spike with pain and stress with wide pulse pressure and reduced vessel elasticity. Home BP diary reviewed, both her own and home health nurse. Overall I'm happy with her readdings, averaging ~ 140/50's. She's feeling less nauseated since she started taking her hydralazine with food. She has longstanding GI issues, finds clothes uncomfortable if they press on the abdomen, is on PPI and H2 blocker. Admits nausea is better with food but finding it difficult to eat since she is vegan, sweets make her feel nauseated, overall appetite poor. No angina.   Has been concerned about taking muscle relaxer prescribed by her other providers having read about potential interaction with GI meds, and started playing with her pain meds a bit, left off her oxycodone yesterday to see if her vision would improve, took a small dose today, urged her not to leave off her pain meds since pain control is paramount to her BP

## 2023-03-29 ENCOUNTER — OFFICE VISIT (OUTPATIENT)
Dept: CARDIOLOGY CLINIC | Age: 83
End: 2023-03-29
Payer: MEDICARE

## 2023-03-29 VITALS
HEART RATE: 63 BPM | HEIGHT: 59 IN | BODY MASS INDEX: 20.44 KG/M2 | DIASTOLIC BLOOD PRESSURE: 40 MMHG | WEIGHT: 101.4 LBS | SYSTOLIC BLOOD PRESSURE: 150 MMHG

## 2023-03-29 DIAGNOSIS — I35.1 AORTIC VALVE INSUFFICIENCY, ACQUIRED: ICD-10-CM

## 2023-03-29 DIAGNOSIS — Q25.3 SUPRAVALVULAR AORTIC STENOSIS: ICD-10-CM

## 2023-03-29 DIAGNOSIS — I35.9 NONRHEUMATIC AORTIC VALVE DISORDER: ICD-10-CM

## 2023-03-29 DIAGNOSIS — I10 ACCELERATED HYPERTENSION: Primary | ICD-10-CM

## 2023-03-29 PROCEDURE — G8427 DOCREV CUR MEDS BY ELIG CLIN: HCPCS | Performed by: INTERNAL MEDICINE

## 2023-03-29 PROCEDURE — 1036F TOBACCO NON-USER: CPT | Performed by: INTERNAL MEDICINE

## 2023-03-29 PROCEDURE — G8484 FLU IMMUNIZE NO ADMIN: HCPCS | Performed by: INTERNAL MEDICINE

## 2023-03-29 PROCEDURE — 99214 OFFICE O/P EST MOD 30 MIN: CPT | Performed by: INTERNAL MEDICINE

## 2023-03-29 PROCEDURE — 3078F DIAST BP <80 MM HG: CPT | Performed by: INTERNAL MEDICINE

## 2023-03-29 PROCEDURE — G8420 CALC BMI NORM PARAMETERS: HCPCS | Performed by: INTERNAL MEDICINE

## 2023-03-29 PROCEDURE — 1123F ACP DISCUSS/DSCN MKR DOCD: CPT | Performed by: INTERNAL MEDICINE

## 2023-03-29 PROCEDURE — 3077F SYST BP >= 140 MM HG: CPT | Performed by: INTERNAL MEDICINE

## 2023-03-29 PROCEDURE — 1090F PRES/ABSN URINE INCON ASSESS: CPT | Performed by: INTERNAL MEDICINE

## 2023-03-29 PROCEDURE — G8400 PT W/DXA NO RESULTS DOC: HCPCS | Performed by: INTERNAL MEDICINE

## 2023-03-29 RX ORDER — TIZANIDINE 2 MG/1
2 TABLET ORAL EVERY 6 HOURS PRN
COMMUNITY

## 2023-03-29 RX ORDER — LANOLIN ALCOHOL/MO/W.PET/CERES
1000 CREAM (GRAM) TOPICAL DAILY
COMMUNITY

## 2023-03-29 RX ORDER — HYDRALAZINE HYDROCHLORIDE 100 MG/1
100 TABLET, FILM COATED ORAL 3 TIMES DAILY
Qty: 270 TABLET | Refills: 3 | Status: SHIPPED | OUTPATIENT
Start: 2023-03-29

## 2023-03-29 ASSESSMENT — ENCOUNTER SYMPTOMS
BLOATING: 1
ABDOMINAL PAIN: 1
SHORTNESS OF BREATH: 0
NAUSEA: 1

## 2023-03-31 ENCOUNTER — TELEPHONE (OUTPATIENT)
Dept: CARDIOLOGY CLINIC | Age: 83
End: 2023-03-31

## 2023-03-31 NOTE — TELEPHONE ENCOUNTER
[facility] notified that Dr. Bonnie Cid would sign cardiac orders but all others should be addressed with the PCP who appears to have re-ordered [address] on 03/09/2023. See other orders tab.

## 2023-05-01 ENCOUNTER — OFFICE VISIT (OUTPATIENT)
Dept: FAMILY MEDICINE CLINIC | Facility: CLINIC | Age: 83
End: 2023-05-01
Payer: MEDICARE

## 2023-05-01 VITALS
TEMPERATURE: 98.2 F | OXYGEN SATURATION: 98 % | SYSTOLIC BLOOD PRESSURE: 132 MMHG | DIASTOLIC BLOOD PRESSURE: 52 MMHG | BODY MASS INDEX: 20.84 KG/M2 | WEIGHT: 103.4 LBS | HEIGHT: 59 IN | HEART RATE: 54 BPM

## 2023-05-01 DIAGNOSIS — M06.9 RHEUMATOID ARTHRITIS, INVOLVING UNSPECIFIED SITE, UNSPECIFIED WHETHER RHEUMATOID FACTOR PRESENT (HCC): ICD-10-CM

## 2023-05-01 DIAGNOSIS — I10 ESSENTIAL (PRIMARY) HYPERTENSION: Primary | ICD-10-CM

## 2023-05-01 DIAGNOSIS — Z12.31 ENCOUNTER FOR SCREENING MAMMOGRAM FOR BREAST CANCER: ICD-10-CM

## 2023-05-01 DIAGNOSIS — M81.0 AGE-RELATED OSTEOPOROSIS WITHOUT CURRENT PATHOLOGICAL FRACTURE: ICD-10-CM

## 2023-05-01 PROCEDURE — G8428 CUR MEDS NOT DOCUMENT: HCPCS | Performed by: FAMILY MEDICINE

## 2023-05-01 PROCEDURE — 1123F ACP DISCUSS/DSCN MKR DOCD: CPT | Performed by: FAMILY MEDICINE

## 2023-05-01 PROCEDURE — G8420 CALC BMI NORM PARAMETERS: HCPCS | Performed by: FAMILY MEDICINE

## 2023-05-01 PROCEDURE — 1090F PRES/ABSN URINE INCON ASSESS: CPT | Performed by: FAMILY MEDICINE

## 2023-05-01 PROCEDURE — 3078F DIAST BP <80 MM HG: CPT | Performed by: FAMILY MEDICINE

## 2023-05-01 PROCEDURE — 3075F SYST BP GE 130 - 139MM HG: CPT | Performed by: FAMILY MEDICINE

## 2023-05-01 PROCEDURE — 99213 OFFICE O/P EST LOW 20 MIN: CPT | Performed by: FAMILY MEDICINE

## 2023-05-01 PROCEDURE — 1036F TOBACCO NON-USER: CPT | Performed by: FAMILY MEDICINE

## 2023-05-01 PROCEDURE — G8400 PT W/DXA NO RESULTS DOC: HCPCS | Performed by: FAMILY MEDICINE

## 2023-05-01 RX ORDER — PREGABALIN 50 MG/1
CAPSULE ORAL
COMMUNITY
Start: 2023-01-22

## 2023-05-01 RX ORDER — TIZANIDINE 2 MG/1
1 TABLET ORAL
COMMUNITY
Start: 2023-02-21

## 2023-05-01 RX ORDER — MONTELUKAST SODIUM 4 MG/1
TABLET, CHEWABLE ORAL
COMMUNITY
Start: 2023-04-21 | End: 2023-05-01

## 2023-05-01 ASSESSMENT — PATIENT HEALTH QUESTIONNAIRE - PHQ9
SUM OF ALL RESPONSES TO PHQ9 QUESTIONS 1 & 2: 2
SUM OF ALL RESPONSES TO PHQ QUESTIONS 1-9: 2
SUM OF ALL RESPONSES TO PHQ QUESTIONS 1-9: 2
1. LITTLE INTEREST OR PLEASURE IN DOING THINGS: 0
SUM OF ALL RESPONSES TO PHQ QUESTIONS 1-9: 2
2. FEELING DOWN, DEPRESSED OR HOPELESS: 2
SUM OF ALL RESPONSES TO PHQ QUESTIONS 1-9: 2

## 2023-05-01 NOTE — PROGRESS NOTES
Luciano Fletcher (: 1940) is a 80 y.o. female, established patient, here for evaluation of the following chief complaint(s):  Follow-up Chronic Condition       ASSESSMENT/PLAN:  1. Essential (primary) hypertension  2. Encounter for screening mammogram for breast cancer  -     [facility] Digital Screen Bilateral [[id number]]; Future  3. Rheumatoid arthritis, involving unspecified site, unspecified whether rheumatoid factor present ([facility] [address]  4. Age-related osteoporosis without current pathological fracture    Hypertension, well controlled today  Seeing pain management and rheumatology, has severe osteoporosis, considering forteo vs another option  Cerumen impaction, will use debrox and return in 3-4 weeks for removal  F/u in 6 months for chronic conditions  SUBJECTIVE/OBJECTIVE:  HPI  Hypertension - stable, well controlled, takes medications as prescribed. denies chest pain, shortness of breath, abdominal pain, nausea, vomiting, diarrhea, dizziness or fainting, headaches or vision changes. Physical Exam  Vitals and nursing note reviewed. Constitutional:       General: She is not in acute distress. Appearance: Normal appearance. She is not ill-appearing. HENT:      Head: Normocephalic and atraumatic. Right Ear: External ear normal. There is impacted cerumen. Left Ear: External ear normal. There is impacted cerumen. Nose: Nose normal.      Mouth/Throat:      Mouth: Mucous membranes are dry. Eyes:      Extraocular Movements: Extraocular movements intact. Pupils: Pupils are equal, round, and reactive to light. Cardiovascular:      Rate and Rhythm: Normal rate. Pulses: Normal pulses. Pulmonary:      Effort: Pulmonary effort is normal.      Breath sounds: Normal breath sounds. Abdominal:      General: There is no distension. Musculoskeletal:         General: Normal range of motion. Cervical back: Normal range of motion and neck supple.    Skin:     General: Skin is warm

## 2023-05-16 ENCOUNTER — TELEPHONE (OUTPATIENT)
Age: 83
End: 2023-05-16

## 2023-05-16 NOTE — TELEPHONE ENCOUNTER
Has had a few low BP readings in the 80s and 90s top number. This is per her home health care nurse. Please advise.

## 2023-05-17 NOTE — TELEPHONE ENCOUNTER
MD Pavel Camp, RN  Caller: Unspecified (Yesterday,  4:31 PM)  Her issues are that she has arthritis pain and we've struggled sometimes to convince her to take her pain meds rather than waiting until it is so bad that she then develops accelerated hypertension with flash pulmonary edema which has happened, I think, twice. When her pain is well controlled, her BP is controlled. The second issue is that she is vegan and on top of that has poor appetite and doesn't eat very well, wouldn't be surprised if she's losing more weight. May reduce her hydralazine to 100 mg BID from TID dosing as long as she is managing her pain, hold if SBP < 90. Attempted to call [id number]. No answer. Left message to call.

## 2023-05-18 NOTE — TELEPHONE ENCOUNTER
Spoke to Arash Harding, pt's [facility], and reviewed Dr. John Ford response. Verb understanding. Will decrease pt's hydralazine to 100 mg BID and hold for SBP <90. Verb understanding. [facility] will review Dr. John Ford instructions with pt.

## 2023-05-28 ASSESSMENT — PATIENT HEALTH QUESTIONNAIRE - PHQ9
2. FEELING DOWN, DEPRESSED OR HOPELESS: NEARLY EVERY DAY
SUM OF ALL RESPONSES TO PHQ QUESTIONS 1-9: 15
5. POOR APPETITE OR OVEREATING: NOT AT ALL
3. TROUBLE FALLING OR STAYING ASLEEP: NEARLY EVERY DAY
2. FEELING DOWN, DEPRESSED OR HOPELESS: 3
1. LITTLE INTEREST OR PLEASURE IN DOING THINGS: NEARLY EVERY DAY
SUM OF ALL RESPONSES TO PHQ QUESTIONS 1-9: 15
SUM OF ALL RESPONSES TO PHQ QUESTIONS 1-9: 15
10. IF YOU CHECKED OFF ANY PROBLEMS, HOW DIFFICULT HAVE THESE PROBLEMS MADE IT FOR YOU TO DO YOUR WORK, TAKE CARE OF THINGS AT HOME, OR GET ALONG WITH OTHER PEOPLE: VERY DIFFICULT
10. IF YOU CHECKED OFF ANY PROBLEMS, HOW DIFFICULT HAVE THESE PROBLEMS MADE IT FOR YOU TO DO YOUR WORK, TAKE CARE OF THINGS AT HOME, OR GET ALONG WITH OTHER PEOPLE: 2
4. FEELING TIRED OR HAVING LITTLE ENERGY: 3
SUM OF ALL RESPONSES TO PHQ QUESTIONS 1-9: 15
SUM OF ALL RESPONSES TO PHQ9 QUESTIONS 1 & 2: 6
4. FEELING TIRED OR HAVING LITTLE ENERGY: NEARLY EVERY DAY
7. TROUBLE CONCENTRATING ON THINGS, SUCH AS READING THE NEWSPAPER OR WATCHING TELEVISION: 1
9. THOUGHTS THAT YOU WOULD BE BETTER OFF DEAD, OR OF HURTING YOURSELF: 0
SUM OF ALL RESPONSES TO PHQ QUESTIONS 1-9: 15
5. POOR APPETITE OR OVEREATING: 0
8. MOVING OR SPEAKING SO SLOWLY THAT OTHER PEOPLE COULD HAVE NOTICED. OR THE OPPOSITE - BEING SO FIDGETY OR RESTLESS THAT YOU HAVE BEEN MOVING AROUND A LOT MORE THAN USUAL: SEVERAL DAYS
6. FEELING BAD ABOUT YOURSELF - OR THAT YOU ARE A FAILURE OR HAVE LET YOURSELF OR YOUR FAMILY DOWN: 1
9. THOUGHTS THAT YOU WOULD BE BETTER OFF DEAD, OR OF HURTING YOURSELF: NOT AT ALL
3. TROUBLE FALLING OR STAYING ASLEEP: 3
SUM OF ALL RESPONSES TO PHQ9 QUESTIONS 1 & 2: 6
6. FEELING BAD ABOUT YOURSELF - OR THAT YOU ARE A FAILURE OR HAVE LET YOURSELF OR YOUR FAMILY DOWN: SEVERAL DAYS
7. TROUBLE CONCENTRATING ON THINGS, SUCH AS READING THE NEWSPAPER OR WATCHING TELEVISION: SEVERAL DAYS
8. MOVING OR SPEAKING SO SLOWLY THAT OTHER PEOPLE COULD HAVE NOTICED. OR THE OPPOSITE, BEING SO FIGETY OR RESTLESS THAT YOU HAVE BEEN MOVING AROUND A LOT MORE THAN USUAL: 1
1. LITTLE INTEREST OR PLEASURE IN DOING THINGS: 3

## 2023-05-31 ENCOUNTER — OFFICE VISIT (OUTPATIENT)
Dept: FAMILY MEDICINE CLINIC | Facility: CLINIC | Age: 83
End: 2023-05-31
Payer: MEDICARE

## 2023-05-31 VITALS — DIASTOLIC BLOOD PRESSURE: 70 MMHG | HEART RATE: 50 BPM | SYSTOLIC BLOOD PRESSURE: 110 MMHG

## 2023-05-31 DIAGNOSIS — H61.23 BILATERAL IMPACTED CERUMEN: ICD-10-CM

## 2023-05-31 DIAGNOSIS — R00.1 BRADYCARDIA: Primary | ICD-10-CM

## 2023-05-31 PROCEDURE — G8428 CUR MEDS NOT DOCUMENT: HCPCS | Performed by: FAMILY MEDICINE

## 2023-05-31 PROCEDURE — 69210 REMOVE IMPACTED EAR WAX UNI: CPT | Performed by: FAMILY MEDICINE

## 2023-05-31 PROCEDURE — 1090F PRES/ABSN URINE INCON ASSESS: CPT | Performed by: FAMILY MEDICINE

## 2023-05-31 PROCEDURE — G8420 CALC BMI NORM PARAMETERS: HCPCS | Performed by: FAMILY MEDICINE

## 2023-05-31 PROCEDURE — 1123F ACP DISCUSS/DSCN MKR DOCD: CPT | Performed by: FAMILY MEDICINE

## 2023-05-31 PROCEDURE — 99214 OFFICE O/P EST MOD 30 MIN: CPT | Performed by: FAMILY MEDICINE

## 2023-05-31 PROCEDURE — 3078F DIAST BP <80 MM HG: CPT | Performed by: FAMILY MEDICINE

## 2023-05-31 PROCEDURE — 3074F SYST BP LT 130 MM HG: CPT | Performed by: FAMILY MEDICINE

## 2023-05-31 PROCEDURE — 1036F TOBACCO NON-USER: CPT | Performed by: FAMILY MEDICINE

## 2023-05-31 PROCEDURE — G8400 PT W/DXA NO RESULTS DOC: HCPCS | Performed by: FAMILY MEDICINE

## 2023-05-31 RX ORDER — CARVEDILOL 6.25 MG/1
6.25 TABLET ORAL 2 TIMES DAILY WITH MEALS
Qty: 180 TABLET | Refills: 3 | Status: SHIPPED | OUTPATIENT
Start: 2023-05-31

## 2023-05-31 NOTE — PROGRESS NOTES
Jordana Lewis (: 1940) is a 80 y.o. female, established patient, here for evaluation of the following chief complaint(s):  No chief complaint on file. ASSESSMENT/PLAN:  1. Bradycardia  2. Bilateral impacted cerumen  -     [id number] - [place] REMOVE IMPACTED EAR WAX    Ceruminosis is noted. Wax is removed by syringing and manual debridement. Instructions for home care to prevent wax buildup are given. Patient having pulses in 40s and some dizziness over past, will decrease coreg to 6.25mg po bid and patient to see Dr. Ann Marie Martinez her cardiologist in next few weeks for f/u. Medicare wellness visit in 6 months, will recheck labs at that time. SUBJECTIVE/OBJECTIVE:  HPI  Pulse is below, patient has a dizzy episodes, pulse in the 40s, as measured at home, pulse in the 50s here today in the office. Here for cerumen impaction removal, hearing loss or discomfort caused by excess cerumen. Physical Exam  Vitals and nursing note reviewed. Constitutional:       General: She is not in acute distress. Appearance: Normal appearance. She is not ill-appearing. HENT:      Head: Normocephalic and atraumatic. Right Ear: External ear normal.      Left Ear: External ear normal.      Nose: Nose normal.      Mouth/Throat:      Mouth: Mucous membranes are dry. Eyes:      Extraocular Movements: Extraocular movements intact. Pupils: Pupils are equal, round, and reactive to light. Cardiovascular:      Rate and Rhythm: Normal rate. Pulses: Normal pulses. Pulmonary:      Effort: Pulmonary effort is normal.      Breath sounds: Normal breath sounds. Abdominal:      General: There is no distension. Musculoskeletal:         General: Normal range of motion. Cervical back: Normal range of motion and neck supple. Skin:     General: Skin is warm and dry. Neurological:      General: No focal deficit present. Mental Status: She is alert and oriented to person, place, and time.

## 2023-06-01 ENCOUNTER — TELEPHONE (OUTPATIENT)
Age: 83
End: 2023-06-01

## 2023-06-01 NOTE — TELEPHONE ENCOUNTER
Gilbert Bonilla from [facility] states that it is time for pt recert and needs a verbal order to continue [facility] for pt. Gilbert Bonilla also states that pt BP has been low today pt BP was 120/48 and the dosage on RX Coreg has been changed to 6.25 mg. Gilbert Bonilla also states that pt pain today is a 9 out of 10, there is a prescription that pt has that will help with the pain but pt will not take is scared that it will make her BP drop.

## 2023-06-02 NOTE — TELEPHONE ENCOUNTER
MD Wendie Perdue MA 1 hour ago (7:35 AM)     BM  We've instructed the patient time and time again that she must use her pain meds as prescribed because without them her BP skyrockets and she's been admitted twice with flash pulmonary edema. Attempted to call pt. No answer. Left message to call.

## 2023-06-02 NOTE — TELEPHONE ENCOUNTER
Spoke with pt. States her diastolic has been staying in the 40s. Just decreased her carvedilol yesterday. Pt states she takes tylenol for pain, oxycodone 5 mg 1/4 tab daily, but doesn't take her zanaflex because she is afraid it will bottom out her BP. Explained to pt that she is harming herself by not controlling her pain. Plainly explained to pt that not controlling her pain has resulted in her BP going up and her going into pulmonary edema, resulting in admission. States \"my blood pressure is really well controlled right now. \" Explained that yes, it may be controlled right now, but her uncontrolled pain is what is going to kill her due to the tendency for it to cause a hypertensive crisis and pulmonary edema. Strongly encouraged pt to take her pain medications as prescribed to prevent spikes in blood pressure. Verb understanding.

## 2023-06-02 NOTE — TELEPHONE ENCOUNTER
This actually should be handled by triage, I think her last call went there as well, this is going to be a recurring issue.  thanks

## 2023-08-16 ENCOUNTER — TELEPHONE (OUTPATIENT)
Age: 83
End: 2023-08-16

## 2023-08-16 NOTE — TELEPHONE ENCOUNTER
Gerardo Kearns PT with [facility]. called stating he would like orders for more PT for the patient. 1W7      Please call and advise.     Tele   [phone]

## 2023-08-22 RX ORDER — FAMOTIDINE 40 MG/1
40 TABLET, FILM COATED ORAL NIGHTLY
Qty: 90 TABLET | Refills: 0 | Status: SHIPPED | OUTPATIENT
Start: 2023-08-22

## 2023-08-22 RX ORDER — EZETIMIBE 10 MG/1
10 TABLET ORAL DAILY
Qty: 90 TABLET | Refills: 0 | Status: SHIPPED | OUTPATIENT
Start: 2023-08-22

## 2023-08-22 NOTE — TELEPHONE ENCOUNTER
The patient would like refills on the following medications:     Famotidine 40 mg one nightly last filled on 2/22/23 with 90 and 1 refill. Ezetimibe 10 mg one daily last filled on 2/22/23 with 90 and 1 refill    Next appointment 11/7/23.

## 2023-09-22 ENCOUNTER — PATIENT MESSAGE (OUTPATIENT)
Dept: FAMILY MEDICINE CLINIC | Facility: CLINIC | Age: 83
End: 2023-09-22

## 2023-09-22 NOTE — TELEPHONE ENCOUNTER
From: Lulu Dover  To: Dr. Stuart Donovan  Sent: 9/22/2023 11:44 AM EDT  Subject: Advice on using [facility]    Dear Dr. Roberto Marin, I would like to ask your advice. My left leg has been getting so painful it is very difficult for me to leave the house. I haven't even been able to go get my flu shot. My [facility] physical therapist suggested yesterday I use [facility]. I had never heard of them. He said it benefits people who are confined to their home, that an NP would come to my house to give me the flu shot. They could also check my urine for a UTI and I thought they could also give me the shingles vaccines. Before calling them I wanted to ask for your thoughts on this matter. Thank you.  Lindsay Thurman

## 2023-09-28 NOTE — PROGRESS NOTES
[facility] CARDIOLOGY  [address] [facility], [address]  PHONE: [phone]      23    NAME:  Jackie Johnson  : 1940  MRN: [id number]         SUBJECTIVE:   Jackie Johnson is a 80 y.o. female seen for a follow up visit regarding the following:     Chief Complaint   Patient presents with    Hypertension    Hyperlipidemia            HPI:  Follow up  Hypertension and Hyperlipidemia   . Follow up supravalvular aortic stenosis with plans for intervention only for severe symptoms. Patient with severe  back pain/spinal stenosis, rheumatoid arthritis. Severe osteoporosis, GI bleed, weight loss, debility we have recommended conservative management and serial evaluation. Flash pulmonary edema due to uncontrolled hypertension in turn d/t uncontrolled arthritis pain with pain control paramount to her BP control. Home BP diary shows excellent control, 110-150/50-70. She is frustrated again with her cable. Her pain providers have changed her pain meds but she is taking them regularly now and as a result her BP is up in office but great at home. Past cardiac history:   Echo 12: moderate AI, normal chamber sizes and EF   Aug 2013: mild to moderate AI, normal LV size and EF, grade I diastolic function. Subaortic membrane noted in some views, peak gradient 30, not hemodynamically significant. Sep 2014 - EF 60-65%, mild AS, peak 27, mean 16. Mild AI, no subaortic membrane, RVSP 32   Sep 2015 - EF 60-65%, moderate AI, mean gradient 20, peak gradient 40, again the valve itself appears normal, now the reader questions supraortic stenosis   Cardiac MRI - narrowing of the caudal aspect of the ascending aorta, just cephalad to the valve, resulting in mild stenosis. Moderate regurgitation. No abnormality of the valve itself and no post stenotic dilatation.  Normal LV size and EF 81%   Sep 2016 - EF 68%, mild AI, AV sclerosis, no stenosis   Sep 2017- EF 67%, mild AV

## 2023-09-29 ENCOUNTER — OFFICE VISIT (OUTPATIENT)
Age: 83
End: 2023-09-29
Payer: MEDICARE

## 2023-09-29 VITALS
SYSTOLIC BLOOD PRESSURE: 188 MMHG | BODY MASS INDEX: 21.45 KG/M2 | DIASTOLIC BLOOD PRESSURE: 60 MMHG | HEIGHT: 59 IN | WEIGHT: 106.4 LBS | HEART RATE: 80 BPM

## 2023-09-29 DIAGNOSIS — I10 ESSENTIAL HYPERTENSION: Primary | ICD-10-CM

## 2023-09-29 DIAGNOSIS — Q25.3 SUPRAVALVULAR AORTIC STENOSIS: ICD-10-CM

## 2023-09-29 DIAGNOSIS — I35.1 NONRHEUMATIC AORTIC VALVE INSUFFICIENCY: ICD-10-CM

## 2023-09-29 DIAGNOSIS — R09.89 LABILE HYPERTENSION: ICD-10-CM

## 2023-09-29 PROCEDURE — G8420 CALC BMI NORM PARAMETERS: HCPCS | Performed by: INTERNAL MEDICINE

## 2023-09-29 PROCEDURE — 3078F DIAST BP <80 MM HG: CPT | Performed by: INTERNAL MEDICINE

## 2023-09-29 PROCEDURE — 99214 OFFICE O/P EST MOD 30 MIN: CPT | Performed by: INTERNAL MEDICINE

## 2023-09-29 PROCEDURE — G8428 CUR MEDS NOT DOCUMENT: HCPCS | Performed by: INTERNAL MEDICINE

## 2023-09-29 PROCEDURE — G8400 PT W/DXA NO RESULTS DOC: HCPCS | Performed by: INTERNAL MEDICINE

## 2023-09-29 PROCEDURE — 1123F ACP DISCUSS/DSCN MKR DOCD: CPT | Performed by: INTERNAL MEDICINE

## 2023-09-29 PROCEDURE — 3077F SYST BP >= 140 MM HG: CPT | Performed by: INTERNAL MEDICINE

## 2023-09-29 PROCEDURE — 1036F TOBACCO NON-USER: CPT | Performed by: INTERNAL MEDICINE

## 2023-09-29 PROCEDURE — 1090F PRES/ABSN URINE INCON ASSESS: CPT | Performed by: INTERNAL MEDICINE

## 2023-09-29 ASSESSMENT — ENCOUNTER SYMPTOMS: SHORTNESS OF BREATH: 0

## 2023-11-04 ASSESSMENT — PATIENT HEALTH QUESTIONNAIRE - PHQ9
SUM OF ALL RESPONSES TO PHQ QUESTIONS 1-9: 14
5. POOR APPETITE OR OVEREATING: NOT AT ALL
4. FEELING TIRED OR HAVING LITTLE ENERGY: 3
SUM OF ALL RESPONSES TO PHQ QUESTIONS 1-9: 14
7. TROUBLE CONCENTRATING ON THINGS, SUCH AS READING THE NEWSPAPER OR WATCHING TELEVISION: SEVERAL DAYS
8. MOVING OR SPEAKING SO SLOWLY THAT OTHER PEOPLE COULD HAVE NOTICED. OR THE OPPOSITE, BEING SO FIGETY OR RESTLESS THAT YOU HAVE BEEN MOVING AROUND A LOT MORE THAN USUAL: 2
SUM OF ALL RESPONSES TO PHQ QUESTIONS 1-9: 14
8. MOVING OR SPEAKING SO SLOWLY THAT OTHER PEOPLE COULD HAVE NOTICED. OR THE OPPOSITE - BEING SO FIDGETY OR RESTLESS THAT YOU HAVE BEEN MOVING AROUND A LOT MORE THAN USUAL: MORE THAN HALF THE DAYS
4. FEELING TIRED OR HAVING LITTLE ENERGY: NEARLY EVERY DAY
3. TROUBLE FALLING OR STAYING ASLEEP: 2
6. FEELING BAD ABOUT YOURSELF - OR THAT YOU ARE A FAILURE OR HAVE LET YOURSELF OR YOUR FAMILY DOWN: 2
SUM OF ALL RESPONSES TO PHQ QUESTIONS 1-9: 14
5. POOR APPETITE OR OVEREATING: 0
1. LITTLE INTEREST OR PLEASURE IN DOING THINGS: 2
SUM OF ALL RESPONSES TO PHQ QUESTIONS 1-9: 14
1. LITTLE INTEREST OR PLEASURE IN DOING THINGS: MORE THAN HALF THE DAYS
2. FEELING DOWN, DEPRESSED OR HOPELESS: MORE THAN HALF THE DAYS
2. FEELING DOWN, DEPRESSED OR HOPELESS: 2
SUM OF ALL RESPONSES TO PHQ9 QUESTIONS 1 & 2: 4
7. TROUBLE CONCENTRATING ON THINGS, SUCH AS READING THE NEWSPAPER OR WATCHING TELEVISION: 1
SUM OF ALL RESPONSES TO PHQ9 QUESTIONS 1 & 2: 4
6. FEELING BAD ABOUT YOURSELF - OR THAT YOU ARE A FAILURE OR HAVE LET YOURSELF OR YOUR FAMILY DOWN: MORE THAN HALF THE DAYS
9. THOUGHTS THAT YOU WOULD BE BETTER OFF DEAD, OR OF HURTING YOURSELF: 0
10. IF YOU CHECKED OFF ANY PROBLEMS, HOW DIFFICULT HAVE THESE PROBLEMS MADE IT FOR YOU TO DO YOUR WORK, TAKE CARE OF THINGS AT HOME, OR GET ALONG WITH OTHER PEOPLE: 2
3. TROUBLE FALLING OR STAYING ASLEEP: MORE THAN HALF THE DAYS
9. THOUGHTS THAT YOU WOULD BE BETTER OFF DEAD, OR OF HURTING YOURSELF: NOT AT ALL
10. IF YOU CHECKED OFF ANY PROBLEMS, HOW DIFFICULT HAVE THESE PROBLEMS MADE IT FOR YOU TO DO YOUR WORK, TAKE CARE OF THINGS AT HOME, OR GET ALONG WITH OTHER PEOPLE: VERY DIFFICULT

## 2023-11-07 ENCOUNTER — OFFICE VISIT (OUTPATIENT)
Dept: FAMILY MEDICINE CLINIC | Facility: CLINIC | Age: 83
End: 2023-11-07
Payer: MEDICARE

## 2023-11-07 VITALS
HEIGHT: 59 IN | HEART RATE: 61 BPM | TEMPERATURE: 98 F | BODY MASS INDEX: 20.99 KG/M2 | SYSTOLIC BLOOD PRESSURE: 138 MMHG | WEIGHT: 104.13 LBS | DIASTOLIC BLOOD PRESSURE: 70 MMHG | OXYGEN SATURATION: 96 %

## 2023-11-07 DIAGNOSIS — I10 ESSENTIAL (PRIMARY) HYPERTENSION: ICD-10-CM

## 2023-11-07 DIAGNOSIS — H91.93 BILATERAL HEARING LOSS, UNSPECIFIED HEARING LOSS TYPE: ICD-10-CM

## 2023-11-07 DIAGNOSIS — R82.90 FOUL SMELLING URINE: Primary | ICD-10-CM

## 2023-11-07 DIAGNOSIS — M06.9 RHEUMATOID ARTHRITIS, INVOLVING UNSPECIFIED SITE, UNSPECIFIED WHETHER RHEUMATOID FACTOR PRESENT (HCC): ICD-10-CM

## 2023-11-07 LAB
BILIRUBIN, URINE, POC: NEGATIVE
BLOOD URINE, POC: NEGATIVE
GLUCOSE URINE, POC: NEGATIVE
KETONES, URINE, POC: NEGATIVE
LEUKOCYTE ESTERASE, URINE, POC: NEGATIVE
NITRITE, URINE, POC: POSITIVE
PH, URINE, POC: 7 (ref 4.6–8)
PROTEIN,URINE, POC: NEGATIVE
SPECIFIC GRAVITY, URINE, POC: 1.02 (ref 1–1.03)
URINALYSIS CLARITY, POC: CLEAR
URINALYSIS COLOR, POC: YELLOW
UROBILINOGEN, POC: NORMAL

## 2023-11-07 PROCEDURE — 3075F SYST BP GE 130 - 139MM HG: CPT | Performed by: FAMILY MEDICINE

## 2023-11-07 PROCEDURE — G8427 DOCREV CUR MEDS BY ELIG CLIN: HCPCS | Performed by: FAMILY MEDICINE

## 2023-11-07 PROCEDURE — G8484 FLU IMMUNIZE NO ADMIN: HCPCS | Performed by: FAMILY MEDICINE

## 2023-11-07 PROCEDURE — 1123F ACP DISCUSS/DSCN MKR DOCD: CPT | Performed by: FAMILY MEDICINE

## 2023-11-07 PROCEDURE — 3078F DIAST BP <80 MM HG: CPT | Performed by: FAMILY MEDICINE

## 2023-11-07 PROCEDURE — G8400 PT W/DXA NO RESULTS DOC: HCPCS | Performed by: FAMILY MEDICINE

## 2023-11-07 PROCEDURE — 81003 URINALYSIS AUTO W/O SCOPE: CPT | Performed by: FAMILY MEDICINE

## 2023-11-07 PROCEDURE — 1090F PRES/ABSN URINE INCON ASSESS: CPT | Performed by: FAMILY MEDICINE

## 2023-11-07 PROCEDURE — 99214 OFFICE O/P EST MOD 30 MIN: CPT | Performed by: FAMILY MEDICINE

## 2023-11-07 PROCEDURE — G8420 CALC BMI NORM PARAMETERS: HCPCS | Performed by: FAMILY MEDICINE

## 2023-11-07 PROCEDURE — 1036F TOBACCO NON-USER: CPT | Performed by: FAMILY MEDICINE

## 2023-11-07 RX ORDER — CHLORZOXAZONE 500 MG/1
TABLET ORAL
COMMUNITY
Start: 2023-08-15

## 2023-11-07 RX ORDER — TRAMADOL HYDROCHLORIDE 50 MG/1
TABLET ORAL
COMMUNITY
Start: 2023-10-23

## 2023-11-07 RX ORDER — CIPROFLOXACIN 250 MG/1
250 TABLET, FILM COATED ORAL 2 TIMES DAILY
Qty: 14 TABLET | Refills: 0 | Status: SHIPPED | OUTPATIENT
Start: 2023-11-07 | End: 2023-11-14

## 2023-11-07 RX ORDER — EZETIMIBE 10 MG/1
TABLET ORAL
Qty: 90 TABLET | Refills: 3 | Status: SHIPPED | OUTPATIENT
Start: 2023-11-07

## 2023-11-07 RX ORDER — FAMOTIDINE 40 MG/1
40 TABLET, FILM COATED ORAL NIGHTLY
Qty: 90 TABLET | Refills: 3 | Status: SHIPPED | OUTPATIENT
Start: 2023-11-07

## 2023-11-07 NOTE — PROGRESS NOTES
Carmen Brady (: 1940) is a 80 y.o. female, established patient, here for evaluation of the following chief complaint(s):  Follow-up and Other (Suspected UTI, bad smell)       ASSESSMENT/PLAN:  1. Foul smelling urine  -     Culture, Urine; Future  -     AMB POC URINALYSIS DIP STICK AUTO W/O MICRO  2. Essential (primary) hypertension  3. Rheumatoid arthritis, involving unspecified site, unspecified whether rheumatoid factor present ([address])  4. Bilateral hearing loss, unspecified hearing loss type  -     [facility], Otol[facility], [place]  Bilateral hearing loss with earwax recurrent, will refer to ENT for further evaluation,  Refill current medications for patient,  UA and urine culture in setting of dysuria, burning with urination, suspect UTI, treat with Cipro course,  We will follow-up results of culture  Patient to return in 6 months for Medicare wellness. SUBJECTIVE/OBJECTIVE:  HPI  See above    Physical Exam  Vitals and nursing note reviewed. Constitutional:       General: She is not in acute distress. Appearance: Normal appearance. She is not ill-appearing. HENT:      Head: Normocephalic and atraumatic. Right Ear: External ear normal.      Left Ear: External ear normal.      Nose: Nose normal.      Mouth/Throat:      Mouth: Mucous membranes are dry. Eyes:      Extraocular Movements: Extraocular movements intact. Pupils: Pupils are equal, round, and reactive to light. Cardiovascular:      Rate and Rhythm: Normal rate. Pulses: Normal pulses. Pulmonary:      Effort: Pulmonary effort is normal.      Breath sounds: Normal breath sounds. Abdominal:      General: There is no distension. Musculoskeletal:         General: Normal range of motion. Cervical back: Normal range of motion and neck supple. Skin:     General: Skin is warm and dry. Neurological:      General: No focal deficit present.       Mental Status: She is alert and

## 2023-11-08 ENCOUNTER — PATIENT MESSAGE (OUTPATIENT)
Dept: FAMILY MEDICINE CLINIC | Facility: CLINIC | Age: 83
End: 2023-11-08

## 2023-11-08 NOTE — TELEPHONE ENCOUNTER
From: Lorrie Dasilva  To: Dr. Jacklyn Hudson  Sent: 11/8/2023 11:48 AM EST  Subject: UTI? Dear Dr. Titus Kenney, I saw that I had positive nitrites in my urine. Does this mean I have a UTI, and should I start taking Cipro or are you waiting for an additional urine culture test?   Also Sara DOMINGUEZ contacted me this morning - the earliest appt. is on March 19, 2024. I took it but that's a very long wait!

## 2023-11-09 LAB
BACTERIA SPEC CULT: ABNORMAL
BACTERIA SPEC CULT: ABNORMAL
SERVICE CMNT-IMP: ABNORMAL

## 2023-11-10 RX ORDER — FUROSEMIDE 20 MG/1
TABLET ORAL
Qty: 90 TABLET | Refills: 0 | Status: SHIPPED | OUTPATIENT
Start: 2023-11-10

## 2024-01-09 ENCOUNTER — TELEPHONE (OUTPATIENT)
Age: 84
End: 2024-01-09

## 2024-01-09 NOTE — TELEPHONE ENCOUNTER
Heart rate is low in the 50s and even 40s  Shaould they get parameters on her Coreg to hold if below a certain number

## 2024-01-09 NOTE — TELEPHONE ENCOUNTER
Spoke to Soledad, pt's HHRN. States pt's HR staying 40s-60s & pt c/o drowsiness & dizziness shortly after taking carvedilol. BP stable. Please advise if dose adjustment needed.

## 2024-01-12 NOTE — TELEPHONE ENCOUNTER
Spoke to pt's HHRN. Made aware Dr. Decker does not want to make any changes in the carvedilol dosing due to pt's labile HTN and hx flash pulmonary edema.

## 2024-02-09 RX ORDER — FUROSEMIDE 20 MG/1
TABLET ORAL
Qty: 90 TABLET | Refills: 0 | Status: SHIPPED | OUTPATIENT
Start: 2024-02-09

## 2024-03-18 RX ORDER — HYDRALAZINE HYDROCHLORIDE 100 MG/1
100 TABLET, FILM COATED ORAL 3 TIMES DAILY
Qty: 270 TABLET | Refills: 0 | Status: SHIPPED | OUTPATIENT
Start: 2024-03-18

## 2024-03-19 ENCOUNTER — OFFICE VISIT (OUTPATIENT)
Dept: ENT CLINIC | Age: 84
End: 2024-03-19
Payer: MEDICARE

## 2024-03-19 ENCOUNTER — OFFICE VISIT (OUTPATIENT)
Dept: AUDIOLOGY | Age: 84
End: 2024-03-19
Payer: MEDICARE

## 2024-03-19 VITALS
DIASTOLIC BLOOD PRESSURE: 72 MMHG | WEIGHT: 106 LBS | SYSTOLIC BLOOD PRESSURE: 136 MMHG | HEIGHT: 59 IN | BODY MASS INDEX: 21.37 KG/M2

## 2024-03-19 DIAGNOSIS — H61.22 IMPACTED CERUMEN OF LEFT EAR: Primary | Chronic | ICD-10-CM

## 2024-03-19 DIAGNOSIS — H90.3 SENSORINEURAL HEARING LOSS (SNHL) OF BOTH EARS: Chronic | ICD-10-CM

## 2024-03-19 DIAGNOSIS — H90.3 SENSORINEURAL HEARING LOSS, BILATERAL: Primary | ICD-10-CM

## 2024-03-19 PROCEDURE — 92567 TYMPANOMETRY: CPT | Performed by: AUDIOLOGIST

## 2024-03-19 PROCEDURE — 1036F TOBACCO NON-USER: CPT | Performed by: PHYSICIAN ASSISTANT

## 2024-03-19 PROCEDURE — 3075F SYST BP GE 130 - 139MM HG: CPT | Performed by: PHYSICIAN ASSISTANT

## 2024-03-19 PROCEDURE — 99204 OFFICE O/P NEW MOD 45 MIN: CPT | Performed by: PHYSICIAN ASSISTANT

## 2024-03-19 PROCEDURE — G8420 CALC BMI NORM PARAMETERS: HCPCS | Performed by: PHYSICIAN ASSISTANT

## 2024-03-19 PROCEDURE — G8484 FLU IMMUNIZE NO ADMIN: HCPCS | Performed by: PHYSICIAN ASSISTANT

## 2024-03-19 PROCEDURE — 1090F PRES/ABSN URINE INCON ASSESS: CPT | Performed by: PHYSICIAN ASSISTANT

## 2024-03-19 PROCEDURE — 69210 REMOVE IMPACTED EAR WAX UNI: CPT | Performed by: PHYSICIAN ASSISTANT

## 2024-03-19 PROCEDURE — G8400 PT W/DXA NO RESULTS DOC: HCPCS | Performed by: PHYSICIAN ASSISTANT

## 2024-03-19 PROCEDURE — 1123F ACP DISCUSS/DSCN MKR DOCD: CPT | Performed by: PHYSICIAN ASSISTANT

## 2024-03-19 PROCEDURE — 3078F DIAST BP <80 MM HG: CPT | Performed by: PHYSICIAN ASSISTANT

## 2024-03-19 PROCEDURE — 92557 COMPREHENSIVE HEARING TEST: CPT | Performed by: AUDIOLOGIST

## 2024-03-19 PROCEDURE — G8427 DOCREV CUR MEDS BY ELIG CLIN: HCPCS | Performed by: PHYSICIAN ASSISTANT

## 2024-03-19 ASSESSMENT — ENCOUNTER SYMPTOMS
ALLERGIC/IMMUNOLOGIC NEGATIVE: 1
GASTROINTESTINAL NEGATIVE: 1
EYES NEGATIVE: 1
RESPIRATORY NEGATIVE: 1

## 2024-03-19 NOTE — PROGRESS NOTES
Beni Houser is a 83 y.o. female presents today with c/o hearing loss it is getting worse and she mentions her mom was profoundly deaf in 1 year and she hopes that is not her case as well.  She does not have any pain or irritation she denies tinnitus.  But she can definitely tell a difference in her ability to hear the world around her.  She been consistently using Debrox to address cerumen impaction noted by her primary care.    Chief Complaint   Patient presents with    New Patient    Hearing Problem     Bilateral hearing loss.  Patient states she isn't able to hear her phone or doorbell.  She has said that she has had a decrease in hearing for years now but she states it has gotten so much worse.  Patient denies pain, drainage, or itching.         Patient Active Problem List   Diagnosis    Spinal stenosis of lumbar region    Urinary incontinence    Mild intermittent asthma    Nonrheumatic aortic valve disorder    Spinal stenosis of lumbar region without neurogenic claudication    Aortic valve stenosis, mild    Family history of colonic polyps    Supravalvular aortic stenosis    Essential hypertension, benign    Onychoschizia    Venous insufficiency of left lower extremity    Impaired fasting glucose    Aortic valve insufficiency, acquired    Lactose intolerance in adult    Chest pain    Malaise and fatigue    Esophageal reflux    Intestinal disaccharidase deficiencies and disaccharide malabsorption    Rheumatoid arthritis involving multiple joints (HCC)    Senile osteoporosis    Pure hypercholesterolemia    Aortic regurgitation    Facet arthropathy, lumbar    Hypertensive emergency        Reviewed and updated this visit by provider:  Tobacco  Allergies  Meds  Problems  Med Hx  Surg Hx  Fam Hx         Review of Systems   Constitutional: Negative.    HENT:  Positive for hearing loss. Negative for ear discharge, ear pain and tinnitus.    Eyes: Negative.    Respiratory: Negative.     Cardiovascular:

## 2024-03-19 NOTE — PROGRESS NOTES
AUDIOLOGY EVALUATION    Beni Houser had Tympanometry and Audiometry performed today.    The patient reports hearing loss.     Results as follows:    Tympanometry    Type A -  bilaterally    Audiometry    Test Performed - Comprehensive Audiogram    Type of Loss - Right Ear: abnormal hearing: degree of loss is mild to severe sensorineural hearing loss                           Left Ear: abnormal hearing: degree of loss is mild to severe sensorineural hearing loss     SRT   Measurement Right Ear Left Ear   Value 45 45   Unit dB dB     Discrimination  Measurement Right Ear Left Ear   Value 72% 76%   Unit dB dB     Recommend  Binaural amplification and annual audios    Higinio Loyd [facility]-A  Audiologist

## 2024-04-02 NOTE — PROGRESS NOTES
[facility] CARDIOLOGY  [address] [place] [address], [address]  [place] [address]  PHONE: [phone]      24    NAME:  Beni Houser  : 1940  MRN: [id number]         SUBJECTIVE:   Beni Houser is a 83 y.o. female seen for a follow up visit regarding the following:     Chief Complaint   Patient presents with    Hypertension            HPI:  Follow up  Hypertension   .    Follow up supravalvular aortic stenosis with plans for intervention only for severe symptoms.  Patient with severe  back pain/spinal stenosis, rheumatoid arthritis. Severe osteoporosis, GI bleed, weight loss, debility. we have recommended conservative management and serial evaluation.  Flash pulmonary edema due to uncontrolled hypertension in turn d/t uncontrolled arthritis pain with pain control paramount to her BP control.  Echo remains stable    She takes her tramadol once at night.  It is prescribed for prn use during the day but she doesn't take it.  She feels her cardiac meds make her tired and dizzy and unable to concentrate.  Her home BP diary shows good control, 110's - 150, averaging 120/60's.  Heart rate 40's-60's.               Past cardiac history:   Echo 12: moderate AI, normal chamber sizes and EF   Aug 2013: mild to moderate AI, normal LV size and EF, grade I diastolic function. Subaortic membrane noted in some views, peak gradient 30, not hemodynamically significant.   Sep 2014 - EF 60-65%, mild AS, peak 27, mean 16. Mild AI, no subaortic membrane, RVSP 32   Sep 2015 - EF 60-65%, moderate AI, mean gradient 20, peak gradient 40, again the valve itself appears normal, now the reader questions supraortic stenosis   Cardiac MRI - narrowing of the caudal aspect of the ascending aorta, just cephalad to the valve, resulting in mild stenosis. Moderate regurgitation. No abnormality of the valve itself and no post stenotic dilatation. Normal LV size and EF 81%   Sep 2016 - EF 68%, mild AI, AV sclerosis, no

## 2024-04-04 ENCOUNTER — OFFICE VISIT (OUTPATIENT)
Age: 84
End: 2024-04-04
Payer: MEDICARE

## 2024-04-04 VITALS
WEIGHT: 107.6 LBS | HEIGHT: 59 IN | DIASTOLIC BLOOD PRESSURE: 79 MMHG | SYSTOLIC BLOOD PRESSURE: 179 MMHG | HEART RATE: 64 BPM | BODY MASS INDEX: 21.69 KG/M2

## 2024-04-04 DIAGNOSIS — I10 ESSENTIAL HYPERTENSION, BENIGN: ICD-10-CM

## 2024-04-04 DIAGNOSIS — I35.9 NONRHEUMATIC AORTIC VALVE DISORDER: Primary | ICD-10-CM

## 2024-04-04 DIAGNOSIS — Q25.3 SUPRAVALVULAR AORTIC STENOSIS: ICD-10-CM

## 2024-04-04 DIAGNOSIS — R00.1 BRADYCARDIA: ICD-10-CM

## 2024-04-04 PROCEDURE — 1036F TOBACCO NON-USER: CPT | Performed by: INTERNAL MEDICINE

## 2024-04-04 PROCEDURE — G8427 DOCREV CUR MEDS BY ELIG CLIN: HCPCS | Performed by: INTERNAL MEDICINE

## 2024-04-04 PROCEDURE — 3078F DIAST BP <80 MM HG: CPT | Performed by: INTERNAL MEDICINE

## 2024-04-04 PROCEDURE — G8420 CALC BMI NORM PARAMETERS: HCPCS | Performed by: INTERNAL MEDICINE

## 2024-04-04 PROCEDURE — 99214 OFFICE O/P EST MOD 30 MIN: CPT | Performed by: INTERNAL MEDICINE

## 2024-04-04 PROCEDURE — 3077F SYST BP >= 140 MM HG: CPT | Performed by: INTERNAL MEDICINE

## 2024-04-04 PROCEDURE — 93000 ELECTROCARDIOGRAM COMPLETE: CPT | Performed by: INTERNAL MEDICINE

## 2024-04-04 PROCEDURE — 1123F ACP DISCUSS/DSCN MKR DOCD: CPT | Performed by: INTERNAL MEDICINE

## 2024-04-04 PROCEDURE — 1090F PRES/ABSN URINE INCON ASSESS: CPT | Performed by: INTERNAL MEDICINE

## 2024-04-04 PROCEDURE — G8400 PT W/DXA NO RESULTS DOC: HCPCS | Performed by: INTERNAL MEDICINE

## 2024-04-04 RX ORDER — ACETAMINOPHEN 500 MG
TABLET ORAL
COMMUNITY
Start: 2022-11-01

## 2024-04-04 RX ORDER — TERIPARATIDE 250 UG/ML
20 INJECTION, SOLUTION SUBCUTANEOUS DAILY
COMMUNITY
Start: 2023-07-11

## 2024-04-04 RX ORDER — PEN NEEDLE, DIABETIC 32GX 5/32"
NEEDLE, DISPOSABLE MISCELLANEOUS
COMMUNITY
Start: 2024-01-29

## 2024-05-06 SDOH — ECONOMIC STABILITY: FOOD INSECURITY: WITHIN THE PAST 12 MONTHS, YOU WORRIED THAT YOUR FOOD WOULD RUN OUT BEFORE YOU GOT MONEY TO BUY MORE.: NEVER TRUE

## 2024-05-06 SDOH — ECONOMIC STABILITY: FOOD INSECURITY: WITHIN THE PAST 12 MONTHS, THE FOOD YOU BOUGHT JUST DIDN'T LAST AND YOU DIDN'T HAVE MONEY TO GET MORE.: NEVER TRUE

## 2024-05-06 SDOH — HEALTH STABILITY: PHYSICAL HEALTH: ON AVERAGE, HOW MANY DAYS PER WEEK DO YOU ENGAGE IN MODERATE TO STRENUOUS EXERCISE (LIKE A BRISK WALK)?: 4 DAYS

## 2024-05-06 SDOH — HEALTH STABILITY: PHYSICAL HEALTH: ON AVERAGE, HOW MANY MINUTES DO YOU ENGAGE IN EXERCISE AT THIS LEVEL?: 30 MIN

## 2024-05-06 SDOH — ECONOMIC STABILITY: INCOME INSECURITY: HOW HARD IS IT FOR YOU TO PAY FOR THE VERY BASICS LIKE FOOD, HOUSING, MEDICAL CARE, AND HEATING?: NOT HARD AT ALL

## 2024-05-06 SDOH — ECONOMIC STABILITY: TRANSPORTATION INSECURITY
IN THE PAST 12 MONTHS, HAS LACK OF TRANSPORTATION KEPT YOU FROM MEETINGS, WORK, OR FROM GETTING THINGS NEEDED FOR DAILY LIVING?: NO

## 2024-05-06 ASSESSMENT — PATIENT HEALTH QUESTIONNAIRE - PHQ9
SUM OF ALL RESPONSES TO PHQ QUESTIONS 1-9: 2
SUM OF ALL RESPONSES TO PHQ QUESTIONS 1-9: 2
1. LITTLE INTEREST OR PLEASURE IN DOING THINGS: SEVERAL DAYS
SUM OF ALL RESPONSES TO PHQ QUESTIONS 1-9: 2
2. FEELING DOWN, DEPRESSED OR HOPELESS: SEVERAL DAYS
SUM OF ALL RESPONSES TO PHQ QUESTIONS 1-9: 2
SUM OF ALL RESPONSES TO PHQ9 QUESTIONS 1 & 2: 2

## 2024-05-06 ASSESSMENT — LIFESTYLE VARIABLES
HOW MANY STANDARD DRINKS CONTAINING ALCOHOL DO YOU HAVE ON A TYPICAL DAY: 0
HOW OFTEN DO YOU HAVE A DRINK CONTAINING ALCOHOL: 1
HOW OFTEN DO YOU HAVE SIX OR MORE DRINKS ON ONE OCCASION: 1
HOW OFTEN DO YOU HAVE A DRINK CONTAINING ALCOHOL: NEVER
HOW MANY STANDARD DRINKS CONTAINING ALCOHOL DO YOU HAVE ON A TYPICAL DAY: PATIENT DOES NOT DRINK

## 2024-05-09 ENCOUNTER — OFFICE VISIT (OUTPATIENT)
Dept: FAMILY MEDICINE CLINIC | Facility: CLINIC | Age: 84
End: 2024-05-09

## 2024-05-09 VITALS
DIASTOLIC BLOOD PRESSURE: 48 MMHG | HEART RATE: 61 BPM | SYSTOLIC BLOOD PRESSURE: 162 MMHG | OXYGEN SATURATION: 98 % | WEIGHT: 105.8 LBS | BODY MASS INDEX: 21.33 KG/M2 | TEMPERATURE: 97.8 F | HEIGHT: 59 IN

## 2024-05-09 DIAGNOSIS — M06.9 RHEUMATOID ARTHRITIS, INVOLVING UNSPECIFIED SITE, UNSPECIFIED WHETHER RHEUMATOID FACTOR PRESENT (HCC): ICD-10-CM

## 2024-05-09 DIAGNOSIS — M81.0 AGE-RELATED OSTEOPOROSIS WITHOUT CURRENT PATHOLOGICAL FRACTURE: ICD-10-CM

## 2024-05-09 DIAGNOSIS — R79.9 ABNORMAL FINDING OF BLOOD CHEMISTRY, UNSPECIFIED: ICD-10-CM

## 2024-05-09 DIAGNOSIS — Z00.00 MEDICARE ANNUAL WELLNESS VISIT, SUBSEQUENT: Primary | ICD-10-CM

## 2024-05-09 DIAGNOSIS — H91.93 BILATERAL HEARING LOSS, UNSPECIFIED HEARING LOSS TYPE: ICD-10-CM

## 2024-05-09 DIAGNOSIS — B35.1 TOENAIL FUNGUS: ICD-10-CM

## 2024-05-09 DIAGNOSIS — D69.6 THROMBOCYTOPENIA, UNSPECIFIED (HCC): ICD-10-CM

## 2024-05-09 DIAGNOSIS — I10 ESSENTIAL (PRIMARY) HYPERTENSION: ICD-10-CM

## 2024-05-09 LAB
ALBUMIN SERPL-MCNC: 3.5 G/DL (ref 3.2–4.6)
ALBUMIN/GLOB SERPL: 1.2 (ref 1–1.9)
ALP SERPL-CCNC: 106 U/L (ref 35–104)
ALT SERPL-CCNC: 13 U/L (ref 12–65)
ANION GAP SERPL CALC-SCNC: 8 MMOL/L (ref 9–18)
AST SERPL-CCNC: 26 U/L (ref 15–37)
BASOPHILS # BLD: 0.1 K/UL (ref 0–0.2)
BASOPHILS NFR BLD: 1 % (ref 0–2)
BILIRUB SERPL-MCNC: 0.4 MG/DL (ref 0–1.2)
BUN SERPL-MCNC: 41 MG/DL (ref 8–23)
CALCIUM SERPL-MCNC: 10.1 MG/DL (ref 8.8–10.2)
CHLORIDE SERPL-SCNC: 102 MMOL/L (ref 98–107)
CHOLEST SERPL-MCNC: 169 MG/DL (ref 0–200)
CO2 SERPL-SCNC: 29 MMOL/L (ref 20–28)
CREAT SERPL-MCNC: 0.96 MG/DL (ref 0.6–1.1)
DIFFERENTIAL METHOD BLD: ABNORMAL
EOSINOPHIL # BLD: 0.1 K/UL (ref 0–0.8)
EOSINOPHIL NFR BLD: 3 % (ref 0.5–7.8)
ERYTHROCYTE [DISTWIDTH] IN BLOOD BY AUTOMATED COUNT: 15.1 % (ref 11.9–14.6)
EST. AVERAGE GLUCOSE BLD GHB EST-MCNC: 104 MG/DL
GLOBULIN SER CALC-MCNC: 2.9 G/DL (ref 2.3–3.5)
GLUCOSE SERPL-MCNC: 96 MG/DL (ref 70–99)
HBA1C MFR BLD: 5.2 % (ref 0–5.6)
HCT VFR BLD AUTO: 35.2 % (ref 35.8–46.3)
HDLC SERPL-MCNC: 64 MG/DL (ref 40–60)
HDLC SERPL: 2.6 (ref 0–5)
HGB BLD-MCNC: 11.2 G/DL (ref 11.7–15.4)
IMM GRANULOCYTES # BLD AUTO: 0 K/UL (ref 0–0.5)
IMM GRANULOCYTES NFR BLD AUTO: 0 % (ref 0–5)
LDLC SERPL CALC-MCNC: 85 MG/DL (ref 0–100)
LYMPHOCYTES # BLD: 0.9 K/UL (ref 0.5–4.6)
LYMPHOCYTES NFR BLD: 19 % (ref 13–44)
MCH RBC QN AUTO: 32.8 PG (ref 26.1–32.9)
MCHC RBC AUTO-ENTMCNC: 31.8 G/DL (ref 31.4–35)
MCV RBC AUTO: 103.2 FL (ref 82–102)
MONOCYTES # BLD: 0.6 K/UL (ref 0.1–1.3)
MONOCYTES NFR BLD: 12 % (ref 4–12)
NEUTS SEG # BLD: 3.2 K/UL (ref 1.7–8.2)
NEUTS SEG NFR BLD: 65 % (ref 43–78)
NRBC # BLD: 0 K/UL (ref 0–0.2)
PLATELET # BLD AUTO: 124 K/UL (ref 150–450)
PMV BLD AUTO: 12.6 FL (ref 9.4–12.3)
POTASSIUM SERPL-SCNC: 4.4 MMOL/L (ref 3.5–5.1)
PROT SERPL-MCNC: 6.4 G/DL (ref 6.3–8.2)
RBC # BLD AUTO: 3.41 M/UL (ref 4.05–5.2)
SODIUM SERPL-SCNC: 139 MMOL/L (ref 136–145)
TRIGL SERPL-MCNC: 101 MG/DL (ref 0–150)
TSH, 3RD GENERATION: 2.83 UIU/ML (ref 0.27–4.2)
VLDLC SERPL CALC-MCNC: 20 MG/DL (ref 6–23)
WBC # BLD AUTO: 4.9 K/UL (ref 4.3–11.1)

## 2024-05-09 RX ORDER — CARVEDILOL 6.25 MG/1
6.25 TABLET ORAL 2 TIMES DAILY WITH MEALS
Qty: 180 TABLET | Refills: 3 | Status: SHIPPED | OUTPATIENT
Start: 2024-05-09

## 2024-05-09 RX ORDER — EZETIMIBE 10 MG/1
TABLET ORAL
Qty: 90 TABLET | Refills: 3 | Status: SHIPPED | OUTPATIENT
Start: 2024-05-09

## 2024-05-09 RX ORDER — FAMOTIDINE 40 MG/1
40 TABLET, FILM COATED ORAL NIGHTLY
Qty: 90 TABLET | Refills: 3 | Status: SHIPPED | OUTPATIENT
Start: 2024-05-09

## 2024-05-09 RX ORDER — PANTOPRAZOLE SODIUM 40 MG/1
TABLET, DELAYED RELEASE ORAL
Qty: 180 TABLET | Refills: 3 | Status: SHIPPED | OUTPATIENT
Start: 2024-05-09

## 2024-05-09 NOTE — PROGRESS NOTES
Beni Houser (: 1940) is a 83 y.o. female, established patient, here for evaluation of the following chief complaint(s):  Medicare AWV and Follow-up Chronic Condition       ASSESSMENT/PLAN:  1. Medicare annual wellness visit, subsequent  -     CBC with Auto Differential; Future  -     Comprehensive Metabolic Panel; Future  -     Lipid Panel; Future  -     TSH; Future  -     Hemoglobin A1C; Future  2. Essential (primary) hypertension  -     CBC with Auto Differential; Future  -     Comprehensive Metabolic Panel; Future  -     Lipid Panel; Future  -     TSH; Future  -     Hemoglobin A1C; Future  3. Rheumatoid arthritis, involving unspecified site, unspecified whether rheumatoid factor present (HCC)  -     CBC with Auto Differential; Future  -     Comprehensive Metabolic Panel; Future  -     Lipid Panel; Future  -     TSH; Future  -     Hemoglobin A1C; Future  4. Bilateral hearing loss, unspecified hearing loss type  -     CBC with Auto Differential; Future  -     Comprehensive Metabolic Panel; Future  -     Lipid Panel; Future  -     TSH; Future  -     Hemoglobin A1C; Future  5. Age-related osteoporosis without current pathological fracture  -     CBC with Auto Differential; Future  -     Comprehensive Metabolic Panel; Future  -     Lipid Panel; Future  -     TSH; Future  -     Hemoglobin A1C; Future  6. Thrombocytopenia, unspecified (HCC)  7. Abnormal finding of blood chemistry, unspecified  -     Hemoglobin A1C; Future  8. Toenail fungus  -     [facility] - Dusty Capps DPM, [facility]      No follow-ups on file.    Medicare wellness visit completed  Refill chronic medications  Check labs today  See dentist every 6 months  See eye physician or get eyes checked every 1-2 years  Immunizations reviewed and discussed with patient    SUBJECTIVE/OBJECTIVE:  HPI      Physical Exam  Vitals and nursing note reviewed.   Constitutional:       General: She is not in acute distress.

## 2024-05-09 NOTE — PATIENT INSTRUCTIONS
you begin, gather your supplies. You will need gloves, floss, a toothbrush, and a container to hold water if you are not near a sink. Wash and dry your hands well and put on gloves. Start by flossing:  Gently work a piece of floss between each of the teeth toward the gums. A plastic flossing tool may make this easier, and they are available at most [facility].  Curve the floss around each tooth into a U-shape and gently slide it under the gum line.  Move the floss firmly up and down several times to scrape off the plaque.  After you've finished flossing, throw away the used floss and begin brushing:  Wet the brush and apply toothpaste.  Place the brush at a 45-degree angle where the teeth meet the gums. Press firmly, and move the brush in small circles over the surface of the teeth.  Be careful not to brush too hard. Vigorous brushing can make the gums pull away from the teeth and can scratch the tooth enamel.  Brush all surfaces of the teeth, on the tongue side and on the cheek side. Pay special attention to the front teeth and all surfaces of the back teeth.  Brush chewing surfaces with short back-and-forth strokes.  After you've finished, help the person rinse the remaining toothpaste from their mouth.  Where can you learn more?  Go to https://www.[employer].net/patientEd and enter F944 to learn more about \"Learning About Dental Care for Older Adults.\"  Current as of: August 6, 2023               Content Version: 14.0  © 2006-2024 [employer].   Care instructions adapted under license by [employer]. If you have questions about a medical condition or this instruction, always ask your healthcare professional. [employer] disclaims any warranty or liability for your use of this information.           Hearing Loss: Care Instructions  Overview     Hearing loss is a sudden or slow decrease in how well you hear. It can range from slight to profound. Permanent hearing loss can occur with

## 2024-08-30 RX ORDER — HYDRALAZINE HYDROCHLORIDE 100 MG/1
100 TABLET, FILM COATED ORAL 2 TIMES DAILY
Qty: 180 TABLET | Refills: 3 | Status: SHIPPED | OUTPATIENT
Start: 2024-08-30

## 2024-08-30 NOTE — TELEPHONE ENCOUNTER
Requested Prescriptions     Pending Prescriptions Disp Refills    hydrALAZINE (APRESOLINE) 100 MG tablet 180 tablet 3     Sig: Take 1 tablet by mouth 2 times daily     Rx verified last ov 4/4/24

## 2024-10-04 NOTE — PROGRESS NOTES
wheezing or rales.   Musculoskeletal:         General: No swelling.      Cervical back: Neck supple.   Skin:     General: Skin is warm and dry.   Neurological:      General: No focal deficit present.      Mental Status: She is alert.   Psychiatric:         Mood and Affect: Mood normal.         Behavior: Behavior normal.         Medical problems and test results were reviewed with the patient today.     DATA REVIEW    LIPID PANEL     Lab Results   Component Value Date    CHOL 169 05/09/2024    CHOL 188 02/22/2023    CHOL 194 11/01/2022     Lab Results   Component Value Date    TRIG 101 05/09/2024    TRIG 82 02/22/2023    TRIG 88 11/01/2022     Lab Results   Component Value Date    HDL 64 (H) 05/09/2024    HDL 88 (H) 02/22/2023    HDL 93 (H) 11/01/2022     No components found for: \"LDLCHOLESTEROL\", \"LDLCALC\"  Lab Results   Component Value Date    VLDL 20 05/09/2024    VLDL 16.4 02/22/2023    VLDL 17.6 11/01/2022     Lab Results   Component Value Date    CHOLHDLRATIO 2.6 05/09/2024    CHOLHDLRATIO 2.1 02/22/2023    CHOLHDLRATIO 2.1 11/01/2022     Lab Results   Component Value Date    LDL 85 05/09/2024         BMP  Lab Results   Component Value Date/Time     05/09/2024 11:14 AM    K 4.4 05/09/2024 11:14 AM     05/09/2024 11:14 AM    CO2 29 05/09/2024 11:14 AM    BUN 41 05/09/2024 11:14 AM    CREATININE 0.96 05/09/2024 11:14 AM    GLUCOSE 96 05/09/2024 11:14 AM    CALCIUM 10.1 05/09/2024 11:14 AM          EKG        CXR/IMAGING        DEVICE INTERROGATION        OUTSIDE RECORDS REVIEW    Records from outside providers have been reviewed and summarized as noted in the HPI, past history and data review sections of this note, and reviewed with patient. .       ASSESSMENT and PLAN    Beni was seen today for 6 month follow-up and hypertension.    Diagnoses and all orders for this visit:    Supravalvular aortic stenosis    Essential hypertension, benign          IMPRESSION:    She continues to do well, better

## 2024-10-07 ENCOUNTER — OFFICE VISIT (OUTPATIENT)
Age: 84
End: 2024-10-07
Payer: MEDICARE

## 2024-10-07 VITALS
HEART RATE: 72 BPM | HEIGHT: 59 IN | DIASTOLIC BLOOD PRESSURE: 48 MMHG | SYSTOLIC BLOOD PRESSURE: 124 MMHG | WEIGHT: 103 LBS | BODY MASS INDEX: 20.76 KG/M2

## 2024-10-07 DIAGNOSIS — Q25.3 SUPRAVALVULAR AORTIC STENOSIS: Primary | ICD-10-CM

## 2024-10-07 DIAGNOSIS — I10 ESSENTIAL HYPERTENSION, BENIGN: ICD-10-CM

## 2024-10-07 PROCEDURE — 1090F PRES/ABSN URINE INCON ASSESS: CPT | Performed by: INTERNAL MEDICINE

## 2024-10-07 PROCEDURE — G8420 CALC BMI NORM PARAMETERS: HCPCS | Performed by: INTERNAL MEDICINE

## 2024-10-07 PROCEDURE — 3074F SYST BP LT 130 MM HG: CPT | Performed by: INTERNAL MEDICINE

## 2024-10-07 PROCEDURE — 3078F DIAST BP <80 MM HG: CPT | Performed by: INTERNAL MEDICINE

## 2024-10-07 PROCEDURE — G8400 PT W/DXA NO RESULTS DOC: HCPCS | Performed by: INTERNAL MEDICINE

## 2024-10-07 PROCEDURE — 1123F ACP DISCUSS/DSCN MKR DOCD: CPT | Performed by: INTERNAL MEDICINE

## 2024-10-07 PROCEDURE — 99214 OFFICE O/P EST MOD 30 MIN: CPT | Performed by: INTERNAL MEDICINE

## 2024-10-07 PROCEDURE — 1036F TOBACCO NON-USER: CPT | Performed by: INTERNAL MEDICINE

## 2024-10-07 PROCEDURE — G8484 FLU IMMUNIZE NO ADMIN: HCPCS | Performed by: INTERNAL MEDICINE

## 2024-10-07 PROCEDURE — G8427 DOCREV CUR MEDS BY ELIG CLIN: HCPCS | Performed by: INTERNAL MEDICINE

## 2024-11-11 ENCOUNTER — OFFICE VISIT (OUTPATIENT)
Dept: FAMILY MEDICINE CLINIC | Facility: CLINIC | Age: 84
End: 2024-11-11

## 2024-11-11 VITALS
HEIGHT: 59 IN | SYSTOLIC BLOOD PRESSURE: 132 MMHG | HEART RATE: 61 BPM | OXYGEN SATURATION: 98 % | DIASTOLIC BLOOD PRESSURE: 56 MMHG | BODY MASS INDEX: 21.65 KG/M2 | TEMPERATURE: 98.1 F | WEIGHT: 107.4 LBS

## 2024-11-11 DIAGNOSIS — H91.93 BILATERAL HEARING LOSS, UNSPECIFIED HEARING LOSS TYPE: ICD-10-CM

## 2024-11-11 DIAGNOSIS — D69.6 THROMBOCYTOPENIA, UNSPECIFIED (HCC): ICD-10-CM

## 2024-11-11 DIAGNOSIS — Z23 NEEDS FLU SHOT: ICD-10-CM

## 2024-11-11 DIAGNOSIS — M06.9 RHEUMATOID ARTHRITIS, INVOLVING UNSPECIFIED SITE, UNSPECIFIED WHETHER RHEUMATOID FACTOR PRESENT (HCC): ICD-10-CM

## 2024-11-11 DIAGNOSIS — I10 ESSENTIAL (PRIMARY) HYPERTENSION: Primary | ICD-10-CM

## 2024-11-11 NOTE — PROGRESS NOTES
Beni Houser (: 1940) is a 84 y.o. female, established patient, here for evaluation of the following chief complaint(s):  Follow-up Chronic Condition       ASSESSMENT/PLAN:  1. Essential (primary) hypertension  2. Needs flu shot  -     Influenza, FLUAD Trivalent, (age 65 y+), IM, Preservative Free, 0.5mL  3. Bilateral hearing loss, unspecified hearing loss type  4. Rheumatoid arthritis, involving unspecified site, unspecified whether rheumatoid factor present (HCC)  5. Thrombocytopenia, unspecified (HCC)    Patient is a 84-year-old female seen today for follow-up chronic issues and concerns, including hyperlipidemia, hypertension Supra valvular aortic stenosis, sees cardiology, undergoes pain management and rheumatology follow-up for severe pain related to rheumatoid arthritis,  Flu shot given today, patient declined refills, states she has enough medication at this time, patient to return to see me in 6 months for follow-up chronic issues  SUBJECTIVE/OBJECTIVE:  HPI  See above    Physical Exam  Vitals and nursing note reviewed.   Constitutional:       General: She is not in acute distress.     Appearance: Normal appearance. She is not ill-appearing.   HENT:      Head: Normocephalic and atraumatic.      Right Ear: External ear normal.      Left Ear: External ear normal.      Nose: Nose normal.      Mouth/Throat:      Mouth: Mucous membranes are dry.   Eyes:      Extraocular Movements: Extraocular movements intact.      Pupils: Pupils are equal, round, and reactive to light.   Cardiovascular:      Rate and Rhythm: Normal rate.      Pulses: Normal pulses.   Pulmonary:      Effort: Pulmonary effort is normal.      Breath sounds: Normal breath sounds.   Abdominal:      General: There is no distension.   Musculoskeletal:         General: Normal range of motion.      Cervical back: Normal range of motion and neck supple.   Skin:     General: Skin is warm and dry.   Neurological:      General: No focal

## 2025-03-27 ENCOUNTER — OFFICE VISIT (OUTPATIENT)
Dept: AUDIOLOGY | Age: 85
End: 2025-03-27

## 2025-03-27 ENCOUNTER — OFFICE VISIT (OUTPATIENT)
Dept: ENT CLINIC | Age: 85
End: 2025-03-27
Payer: MEDICARE

## 2025-03-27 VITALS — BODY MASS INDEX: 22.06 KG/M2 | HEIGHT: 59 IN

## 2025-03-27 DIAGNOSIS — H90.3 SENSORINEURAL HEARING LOSS, BILATERAL: Primary | ICD-10-CM

## 2025-03-27 DIAGNOSIS — H61.23 BILATERAL IMPACTED CERUMEN: Primary | Chronic | ICD-10-CM

## 2025-03-27 DIAGNOSIS — H90.3 SENSORINEURAL HEARING LOSS (SNHL) OF BOTH EARS: Chronic | ICD-10-CM

## 2025-03-27 PROCEDURE — G8400 PT W/DXA NO RESULTS DOC: HCPCS | Performed by: PHYSICIAN ASSISTANT

## 2025-03-27 PROCEDURE — 1090F PRES/ABSN URINE INCON ASSESS: CPT | Performed by: PHYSICIAN ASSISTANT

## 2025-03-27 PROCEDURE — 99213 OFFICE O/P EST LOW 20 MIN: CPT | Performed by: PHYSICIAN ASSISTANT

## 2025-03-27 PROCEDURE — 1126F AMNT PAIN NOTED NONE PRSNT: CPT | Performed by: PHYSICIAN ASSISTANT

## 2025-03-27 PROCEDURE — 1159F MED LIST DOCD IN RCRD: CPT | Performed by: PHYSICIAN ASSISTANT

## 2025-03-27 PROCEDURE — 1160F RVW MEDS BY RX/DR IN RCRD: CPT | Performed by: PHYSICIAN ASSISTANT

## 2025-03-27 PROCEDURE — 1036F TOBACCO NON-USER: CPT | Performed by: PHYSICIAN ASSISTANT

## 2025-03-27 PROCEDURE — G8420 CALC BMI NORM PARAMETERS: HCPCS | Performed by: PHYSICIAN ASSISTANT

## 2025-03-27 PROCEDURE — 1123F ACP DISCUSS/DSCN MKR DOCD: CPT | Performed by: PHYSICIAN ASSISTANT

## 2025-03-27 PROCEDURE — 69210 REMOVE IMPACTED EAR WAX UNI: CPT | Performed by: PHYSICIAN ASSISTANT

## 2025-03-27 PROCEDURE — G8427 DOCREV CUR MEDS BY ELIG CLIN: HCPCS | Performed by: PHYSICIAN ASSISTANT

## 2025-03-27 NOTE — PROGRESS NOTES
AUDIOLOGY EVALUATION    Beni Houser had Tympanometry and Audiometry performed today.    The patient reports hearing loss.  She uses binaural ERIC hearing aids dispensed by [employer].    Results as follows:    Tympanometry    Type A -  on left  Type As -  on right    Audiometry    Test Performed - Comprehensive Audiogram    Type of Loss - Right Ear: abnormal hearing: degree of loss is normal to severe sensorineural hearing loss                           Left Ear: abnormal hearing: degree of loss is mild to severe sensorineural hearing loss     SRT   Measurement Right Ear Left Ear   Value 45 60   Unit dB dB     Discrimination  Measurement Right Ear Left Ear   Value 64% 72%   Unit dB dB     Recommend  Continued use of amplification  Annual audios    Anitra Desai, CCC-A

## 2025-03-27 NOTE — PROGRESS NOTES
History of Present Illness  The patient is an 84-year-old female presenting for her annual audiogram. She did get hearing aids and is doing well with them.    She has been utilizing hearing aids procured from [employer], which have been meeting her auditory needs. However, she has experienced episodes of complete hearing loss due to cerumen impaction, necessitating two instances of ear cleaning. Despite using Debrox, she has not observed any noticeable cerumen expulsion.    MEDICATIONS  Debrox      Audiogram:   Right ear: normal sloping to severe SNHL  Left ear:  mild sloping to severe SNHL  Discrimination score: Right ear 64 % and Left ear 72 %   Tympanogram: Right ear Type As and Left ear Type A.       Chief Complaint   Patient presents with    Follow-up     1 yr with audio.  Patient did get HA's and it doing well.  Denies pain, drainage, and itching.  Denies tinnitus.  No other complaints at this time.         Patient Active Problem List   Diagnosis    Spinal stenosis of lumbar region    Urinary incontinence    Mild intermittent asthma    Nonrheumatic aortic valve disorder    Spinal stenosis of lumbar region without neurogenic claudication    Aortic valve stenosis, mild    Family history of colonic polyps    Supravalvular aortic stenosis    Essential hypertension, benign    Onychoschizia    Venous insufficiency of left lower extremity    Impaired fasting glucose    Aortic valve insufficiency, acquired    Lactose intolerance in adult    Chest pain    Malaise and fatigue    Esophageal reflux    Intestinal disaccharidase deficiencies and disaccharide malabsorption    Rheumatoid arthritis involving multiple joints (HCC)    Senile osteoporosis    Pure hypercholesterolemia    Aortic regurgitation    Facet arthropathy, lumbar    Hypertensive emergency    Thrombocytopenia, unspecified        Reviewed and updated this visit by provider:  Tobacco  Allergies  Meds  Problems  Med Hx  Surg Hx  Fam Hx         Review of

## 2025-04-10 NOTE — PROGRESS NOTES
[facility] CARDIOLOGY  [address] [place] [address], [address]  [place] [address]  PHONE: [phone]      25    NAME:  Beni Houser  : 1940  MRN: [id number]         SUBJECTIVE:   Beni Houser is a 84 y.o. female seen for a follow up visit regarding the following:     Chief Complaint   Patient presents with    Hypertension            HPI:  Follow up  Hypertension   .    Follow up supravalvular aortic stenosis with plans for intervention only for severe symptoms.  Patient with severe  back pain/spinal stenosis, rheumatoid arthritis. Severe osteoporosis, GI bleed, weight loss, debility. we have recommended conservative management and serial evaluation.  Flash pulmonary edema due to uncontrolled hypertension in turn d/t uncontrolled arthritis pain with pain control paramount to her BP control.  Echo appears generally stable.     Home BP diary reviewed, /50-60.  She had a severe viral infection in January, she thinks it might have been RSV, she was extremely weak.  Sadly, her friend and caregiver just  unexpectedly at 53 with cancer.  Her next closest relative is a niece in [place].  She has a home aide that comes in a couple of days a week to help her with cleaning and shopping.  That's really her only local social contact.  She is not involved in a [employer].  She says her PCP has mentioned the possibility of moving to [place] but patient hasn't been interested in that.     She mentions that she has had some episodes of orthostatic symptoms and weakness in the afternoon.  She's left off the 2nd dose a couple of times in order to do her shopping and notes she feels better, her BP on those days remains 110-120.  She doesn't get the dizziness when she takes it only once.  She's actually been setting an alarm to get up at 1 am to take her medication in order to make it exactly 12 hours between doses.              Past cardiac history:   Echo 12: moderate AI, normal chamber sizes and EF   Aug

## 2025-04-11 ENCOUNTER — OFFICE VISIT (OUTPATIENT)
Age: 85
End: 2025-04-11
Payer: MEDICARE

## 2025-04-11 VITALS
HEART RATE: 49 BPM | WEIGHT: 106 LBS | SYSTOLIC BLOOD PRESSURE: 158 MMHG | HEIGHT: 58 IN | DIASTOLIC BLOOD PRESSURE: 62 MMHG | BODY MASS INDEX: 22.25 KG/M2

## 2025-04-11 DIAGNOSIS — Q25.3 SUPRAVALVULAR AORTIC STENOSIS: ICD-10-CM

## 2025-04-11 DIAGNOSIS — I35.1 AORTIC VALVE INSUFFICIENCY, ACQUIRED: ICD-10-CM

## 2025-04-11 DIAGNOSIS — I95.89 IATROGENIC HYPOTENSION: ICD-10-CM

## 2025-04-11 DIAGNOSIS — I10 ESSENTIAL HYPERTENSION, BENIGN: ICD-10-CM

## 2025-04-11 DIAGNOSIS — I35.0 AORTIC VALVE STENOSIS, MILD: Primary | ICD-10-CM

## 2025-04-11 PROCEDURE — 1160F RVW MEDS BY RX/DR IN RCRD: CPT | Performed by: INTERNAL MEDICINE

## 2025-04-11 PROCEDURE — G8427 DOCREV CUR MEDS BY ELIG CLIN: HCPCS | Performed by: INTERNAL MEDICINE

## 2025-04-11 PROCEDURE — 1090F PRES/ABSN URINE INCON ASSESS: CPT | Performed by: INTERNAL MEDICINE

## 2025-04-11 PROCEDURE — 93000 ELECTROCARDIOGRAM COMPLETE: CPT | Performed by: INTERNAL MEDICINE

## 2025-04-11 PROCEDURE — G8400 PT W/DXA NO RESULTS DOC: HCPCS | Performed by: INTERNAL MEDICINE

## 2025-04-11 PROCEDURE — 3077F SYST BP >= 140 MM HG: CPT | Performed by: INTERNAL MEDICINE

## 2025-04-11 PROCEDURE — 1036F TOBACCO NON-USER: CPT | Performed by: INTERNAL MEDICINE

## 2025-04-11 PROCEDURE — 3078F DIAST BP <80 MM HG: CPT | Performed by: INTERNAL MEDICINE

## 2025-04-11 PROCEDURE — 1159F MED LIST DOCD IN RCRD: CPT | Performed by: INTERNAL MEDICINE

## 2025-04-11 PROCEDURE — 1123F ACP DISCUSS/DSCN MKR DOCD: CPT | Performed by: INTERNAL MEDICINE

## 2025-04-11 PROCEDURE — G8420 CALC BMI NORM PARAMETERS: HCPCS | Performed by: INTERNAL MEDICINE

## 2025-04-11 PROCEDURE — 99214 OFFICE O/P EST MOD 30 MIN: CPT | Performed by: INTERNAL MEDICINE

## 2025-05-08 RX ORDER — CARVEDILOL 6.25 MG/1
6.25 TABLET ORAL 2 TIMES DAILY WITH MEALS
Qty: 180 TABLET | Refills: 0 | Status: SHIPPED | OUTPATIENT
Start: 2025-05-08

## 2025-05-13 SDOH — ECONOMIC STABILITY: INCOME INSECURITY: IN THE LAST 12 MONTHS, WAS THERE A TIME WHEN YOU WERE NOT ABLE TO PAY THE MORTGAGE OR RENT ON TIME?: NO

## 2025-05-13 SDOH — HEALTH STABILITY: PHYSICAL HEALTH: ON AVERAGE, HOW MANY DAYS PER WEEK DO YOU ENGAGE IN MODERATE TO STRENUOUS EXERCISE (LIKE A BRISK WALK)?: 6 DAYS

## 2025-05-13 SDOH — ECONOMIC STABILITY: FOOD INSECURITY: WITHIN THE PAST 12 MONTHS, YOU WORRIED THAT YOUR FOOD WOULD RUN OUT BEFORE YOU GOT MONEY TO BUY MORE.: NEVER TRUE

## 2025-05-13 SDOH — ECONOMIC STABILITY: FOOD INSECURITY: WITHIN THE PAST 12 MONTHS, THE FOOD YOU BOUGHT JUST DIDN'T LAST AND YOU DIDN'T HAVE MONEY TO GET MORE.: NEVER TRUE

## 2025-05-13 SDOH — HEALTH STABILITY: PHYSICAL HEALTH: ON AVERAGE, HOW MANY MINUTES DO YOU ENGAGE IN EXERCISE AT THIS LEVEL?: 50 MIN

## 2025-05-13 SDOH — ECONOMIC STABILITY: TRANSPORTATION INSECURITY
IN THE PAST 12 MONTHS, HAS THE LACK OF TRANSPORTATION KEPT YOU FROM MEDICAL APPOINTMENTS OR FROM GETTING MEDICATIONS?: NO

## 2025-05-13 ASSESSMENT — LIFESTYLE VARIABLES
HOW MANY STANDARD DRINKS CONTAINING ALCOHOL DO YOU HAVE ON A TYPICAL DAY: 0
HOW MANY STANDARD DRINKS CONTAINING ALCOHOL DO YOU HAVE ON A TYPICAL DAY: PATIENT DOES NOT DRINK
HOW OFTEN DO YOU HAVE A DRINK CONTAINING ALCOHOL: 1
HOW OFTEN DO YOU HAVE SIX OR MORE DRINKS ON ONE OCCASION: 1
HOW OFTEN DO YOU HAVE A DRINK CONTAINING ALCOHOL: NEVER

## 2025-05-13 ASSESSMENT — PATIENT HEALTH QUESTIONNAIRE - PHQ9
SUM OF ALL RESPONSES TO PHQ QUESTIONS 1-9: 1
1. LITTLE INTEREST OR PLEASURE IN DOING THINGS: NOT AT ALL
2. FEELING DOWN, DEPRESSED OR HOPELESS: SEVERAL DAYS
SUM OF ALL RESPONSES TO PHQ QUESTIONS 1-9: 1

## 2025-05-16 ENCOUNTER — OFFICE VISIT (OUTPATIENT)
Dept: FAMILY MEDICINE CLINIC | Facility: CLINIC | Age: 85
End: 2025-05-16

## 2025-05-16 VITALS
HEART RATE: 57 BPM | TEMPERATURE: 97.8 F | SYSTOLIC BLOOD PRESSURE: 136 MMHG | BODY MASS INDEX: 22.04 KG/M2 | OXYGEN SATURATION: 97 % | DIASTOLIC BLOOD PRESSURE: 56 MMHG | HEIGHT: 58 IN | WEIGHT: 105 LBS

## 2025-05-16 DIAGNOSIS — Z00.00 MEDICARE ANNUAL WELLNESS VISIT, SUBSEQUENT: Primary | ICD-10-CM

## 2025-05-16 DIAGNOSIS — D69.6 THROMBOCYTOPENIA, UNSPECIFIED: ICD-10-CM

## 2025-05-16 DIAGNOSIS — H91.93 BILATERAL HEARING LOSS, UNSPECIFIED HEARING LOSS TYPE: ICD-10-CM

## 2025-05-16 DIAGNOSIS — M06.9 RHEUMATOID ARTHRITIS, INVOLVING UNSPECIFIED SITE, UNSPECIFIED WHETHER RHEUMATOID FACTOR PRESENT (HCC): ICD-10-CM

## 2025-05-16 DIAGNOSIS — R79.9 ABNORMAL FINDING OF BLOOD CHEMISTRY, UNSPECIFIED: ICD-10-CM

## 2025-05-16 DIAGNOSIS — E55.9 VITAMIN D DEFICIENCY, UNSPECIFIED: ICD-10-CM

## 2025-05-16 DIAGNOSIS — I10 ESSENTIAL (PRIMARY) HYPERTENSION: ICD-10-CM

## 2025-05-16 RX ORDER — CARVEDILOL 6.25 MG/1
6.25 TABLET ORAL 2 TIMES DAILY WITH MEALS
Qty: 180 TABLET | Refills: 3 | Status: SHIPPED | OUTPATIENT
Start: 2025-05-16

## 2025-05-16 RX ORDER — FAMOTIDINE 40 MG/1
40 TABLET, FILM COATED ORAL NIGHTLY
Qty: 90 TABLET | Refills: 3 | Status: SHIPPED | OUTPATIENT
Start: 2025-05-16

## 2025-05-16 RX ORDER — EZETIMIBE 10 MG/1
TABLET ORAL
Qty: 90 TABLET | Refills: 3 | Status: SHIPPED | OUTPATIENT
Start: 2025-05-16

## 2025-05-16 RX ORDER — PANTOPRAZOLE SODIUM 40 MG/1
TABLET, DELAYED RELEASE ORAL
Qty: 180 TABLET | Refills: 3 | Status: SHIPPED | OUTPATIENT
Start: 2025-05-16

## 2025-05-16 NOTE — PROGRESS NOTES
Beni Houser (: 1940) is a 84 y.o. female, established patient, here for evaluation of the following chief complaint(s):  Follow-up Chronic Condition and Medicare AWV         Assessment & Plan  1. Recurrent urinary tract infections (UTIs).  - She has experienced multiple UTIs and was previously treated with nitrofurantoin.  - Anisa Mario Alberto suggested taking over-the-counter D-mannose and increasing cranberry tablets to prevent future UTIs.  - She will start these supplements as she has already purchased them.  - No current symptoms of UTI reported during this visit.    2. Rheumatoid arthritis.  - Reports significant difficulty using her hands, which is affecting her daily activities.  - Exercises prescribed by her physical therapist have helped reduce pain.  - No new treatment was discussed during this visit.  - Continues to manage symptoms with current regimen.    3. Osteoporosis.  - Recent DEXA scan shows a 3% improvement compared to the previous year.  - Expressed interest in resuming Forteo treatment but is concerned about the cost.  - Will discuss this further with her rheumatologist.  - Previous treatment with Forteo was effective but discontinued due to cost.    4. Health maintenance.  - Has not yet received the shingles vaccine.  - Discussed that the shingles vaccine might help reduce the risk of dementia.  - Advised to consider getting the shingles vaccine.  - No immediate plans to receive the vaccine were made during this visit.    5. Medication management.  - Confirmed that she already has refills for carvedilol, Zetia, Pepcid, and Protonix.  - No new refills are needed at this time.  - Continues to take medications as prescribed.    6. Blood work.  - Blood work was ordered and printed out for the patient to take along with the other doctor's order to get it done at the same time.  - If they do not draw them, she can always get them done at our lab.  - Coordination of lab work to minimize

## 2025-05-22 ENCOUNTER — LAB (OUTPATIENT)
Dept: FAMILY MEDICINE CLINIC | Facility: CLINIC | Age: 85
End: 2025-05-22

## 2025-05-22 ENCOUNTER — TELEPHONE (OUTPATIENT)
Dept: FAMILY MEDICINE CLINIC | Facility: CLINIC | Age: 85
End: 2025-05-22

## 2025-05-22 DIAGNOSIS — Z00.00 MEDICARE ANNUAL WELLNESS VISIT, SUBSEQUENT: ICD-10-CM

## 2025-05-22 DIAGNOSIS — D69.6 THROMBOCYTOPENIA, UNSPECIFIED: ICD-10-CM

## 2025-05-22 DIAGNOSIS — H91.93 BILATERAL HEARING LOSS, UNSPECIFIED HEARING LOSS TYPE: ICD-10-CM

## 2025-05-22 DIAGNOSIS — I10 ESSENTIAL (PRIMARY) HYPERTENSION: ICD-10-CM

## 2025-05-22 DIAGNOSIS — R79.9 ABNORMAL FINDING OF BLOOD CHEMISTRY, UNSPECIFIED: ICD-10-CM

## 2025-05-22 DIAGNOSIS — E55.9 VITAMIN D DEFICIENCY, UNSPECIFIED: ICD-10-CM

## 2025-05-22 DIAGNOSIS — M06.9 RHEUMATOID ARTHRITIS, INVOLVING UNSPECIFIED SITE, UNSPECIFIED WHETHER RHEUMATOID FACTOR PRESENT (HCC): ICD-10-CM

## 2025-05-22 LAB
25(OH)D3 SERPL-MCNC: 55.1 NG/ML (ref 30–100)
ALBUMIN SERPL-MCNC: 3.2 G/DL (ref 3.2–4.6)
ALBUMIN/GLOB SERPL: 1 (ref 1–1.9)
ALP SERPL-CCNC: 58 U/L (ref 35–104)
ALT SERPL-CCNC: 17 U/L (ref 8–45)
ANION GAP SERPL CALC-SCNC: 10 MMOL/L (ref 7–16)
AST SERPL-CCNC: 34 U/L (ref 15–37)
BILIRUB SERPL-MCNC: 0.4 MG/DL (ref 0–1.2)
BUN SERPL-MCNC: 23 MG/DL (ref 8–23)
CALCIUM SERPL-MCNC: 9.7 MG/DL (ref 8.8–10.2)
CHLORIDE SERPL-SCNC: 104 MMOL/L (ref 98–107)
CHOLEST SERPL-MCNC: 192 MG/DL (ref 0–200)
CO2 SERPL-SCNC: 27 MMOL/L (ref 20–29)
CREAT SERPL-MCNC: 0.9 MG/DL (ref 0.6–1.1)
EST. AVERAGE GLUCOSE BLD GHB EST-MCNC: 108 MG/DL
GLOBULIN SER CALC-MCNC: 3.3 G/DL (ref 2.3–3.5)
GLUCOSE SERPL-MCNC: 93 MG/DL (ref 70–99)
HBA1C MFR BLD: 5.4 % (ref 0–5.6)
HDLC SERPL-MCNC: 71 MG/DL (ref 40–60)
HDLC SERPL: 2.7 (ref 0–5)
LDLC SERPL CALC-MCNC: 103 MG/DL (ref 0–100)
POTASSIUM SERPL-SCNC: 4.1 MMOL/L (ref 3.5–5.1)
PROT SERPL-MCNC: 6.4 G/DL (ref 6.3–8.2)
SODIUM SERPL-SCNC: 140 MMOL/L (ref 136–145)
TRIGL SERPL-MCNC: 90 MG/DL (ref 0–150)
TSH, 3RD GENERATION: 2.27 UIU/ML (ref 0.27–4.2)
VLDLC SERPL CALC-MCNC: 18 MG/DL (ref 6–23)

## 2025-05-22 NOTE — TELEPHONE ENCOUNTER
----- Message from Sandy BLANCO sent at 5/22/2025 11:25 AM EDT -----  Regarding: Lab Reorder  The following lab test(s) were not completed.  Lab tests:  CBC with differential   Recollect reason: Patient did not want this test drawn because they had one drawn at another location on the 20th of May.     Please consult with the ordering physician/APC if the tests are needed.    If this test requires recollection, please re-enter order in Epic within 24 hours of this notice and respond to this message as Client Services will contact the patient.     If this test DOES NOT require recollection, document this in Epic documentation only encounter.    If you need additional information, respond to this message and/or call [phone].    [facility] Client Services

## 2025-05-23 ENCOUNTER — RESULTS FOLLOW-UP (OUTPATIENT)
Dept: FAMILY MEDICINE CLINIC | Facility: CLINIC | Age: 85
End: 2025-05-23

## (undated) DEVICE — CATHETER DIAG AD 5FR L110CM 145DEG COR GRY HYDRPHLC NYL

## (undated) DEVICE — GLIDESHEATH SLENDER STAINLESS STEEL KIT: Brand: GLIDESHEATH SLENDER

## (undated) DEVICE — BAND COMPR L21CM SHT CLR PLAS HEMSTAT EXT HK AND LOOP RETEN

## (undated) DEVICE — GUIDEWIRE 035IN 210CM PTFE COAT FIX COR J TIP 15MM FIRM BODY

## (undated) DEVICE — RADIFOCUS OPTITORQUE ANGIOGRAPHIC CATHETER: Brand: OPTITORQUE